# Patient Record
Sex: FEMALE | Race: WHITE | Employment: OTHER | URBAN - METROPOLITAN AREA
[De-identification: names, ages, dates, MRNs, and addresses within clinical notes are randomized per-mention and may not be internally consistent; named-entity substitution may affect disease eponyms.]

---

## 2017-03-07 ENCOUNTER — HOSPITAL ENCOUNTER (OUTPATIENT)
Dept: MAMMOGRAPHY | Age: 72
Discharge: HOME | End: 2017-03-07
Attending: INTERNAL MEDICINE | Admitting: INTERNAL MEDICINE
Payer: MEDICARE

## 2017-03-07 DIAGNOSIS — Z12.31 VISIT FOR SCREENING MAMMOGRAM: ICD-10-CM

## 2017-03-07 PROCEDURE — 77067 SCR MAMMO BI INCL CAD: CPT

## 2017-07-19 ENCOUNTER — TELEPHONE (OUTPATIENT)
Dept: INTERNAL MEDICINE CLINIC | Age: 72
End: 2017-07-19
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
## 2017-07-24 RX ORDER — AMITRIPTYLINE HYDROCHLORIDE 150 MG/1
TABLET, FILM COATED ORAL
Qty: 90 TAB | Refills: 1 | Status: SHIPPED | OUTPATIENT
Start: 2017-07-24 | End: 2018-01-16 | Stop reason: SDUPTHER

## 2017-08-07 RX ORDER — PILOCARPINE HYDROCHLORIDE 5 MG/1
TABLET, FILM COATED ORAL
Qty: 120 TAB | Refills: 3 | Status: SHIPPED | OUTPATIENT
Start: 2017-08-07 | End: 2018-01-05 | Stop reason: SDUPTHER

## 2017-08-14 ENCOUNTER — OFFICE VISIT (OUTPATIENT)
Dept: NEUROLOGY | Age: 72
End: 2017-08-14

## 2017-08-14 VITALS
OXYGEN SATURATION: 98 % | DIASTOLIC BLOOD PRESSURE: 70 MMHG | HEART RATE: 76 BPM | HEIGHT: 63 IN | SYSTOLIC BLOOD PRESSURE: 132 MMHG | WEIGHT: 169 LBS | BODY MASS INDEX: 29.95 KG/M2

## 2017-08-14 DIAGNOSIS — R26.9 GAIT DIFFICULTY: Primary | ICD-10-CM

## 2017-08-14 RX ORDER — METOPROLOL SUCCINATE 25 MG/1
TABLET, EXTENDED RELEASE ORAL DAILY
COMMUNITY
End: 2018-05-31

## 2017-08-14 NOTE — PATIENT INSTRUCTIONS
[facility]   Statement to Patients  April 1, 2014      In an effort to ensure the large volume of patient prescription refills is processed in the most efficient and expeditious manner, we are asking our patients to assist us by calling your Pharmacy for all prescription refills, this will include also your  Mail Order Pharmacy. The pharmacy will contact our office electronically to continue the refill process. Please do not wait until the last minute to call your pharmacy. We need at least 48 hours (2days) to fill prescriptions. We also encourage you to call your pharmacy before going to  your prescription to make sure it is ready. With regard to controlled substance prescription refill requests (narcotic refills) that need to be picked up at our office, we ask your cooperation by providing us with at least 72 hours (3days) notice that you will need a refill. We will not refill narcotic prescription refill requests after 4:00pm on any weekday, Monday through Thursday, or after 2:00pm on Fridays, or on the weekends. We encourage everyone to explore another way of getting your prescription refill request processed using MarkIT[employer], our patient web portal through our electronic medical record system. MarkITx is an efficient and effective way to communicate your medication request directly to the office and  downloadable as an wang on your smart phone . MarkITx also features a review functionality that allows you to view your medication list as well as leave messages for your physician. Are you ready to get connected? If so please review the attatched instructions or speak to any of our staff to get you set up right away! Thank you so much for your cooperation. Should you have any questions please contact our Practice Administrator.     The Physicians and Staff,  [facility]

## 2017-08-14 NOTE — PROGRESS NOTES
HISTORY OF PRESENT ILLNESS  Susan Hirsch is a 67 y.o. female. HPI Comments: Elinor Loco is here today for trouble with falls. She states that once every month or 2 she will have a fall. It can occur outside it can occur inside. It does not have to involve any obstruction on the floor such as a bump in the rug or a threshold. She denies any bowel or bladder complaints. She denies weakness elsewhere in her body. Has no family history of significant neurologic disease. She is a healthy person but does have a history of migraine headaches for which she takes amitriptyline 150 mg a day. Fall   The history is provided by the patient. Associated symptoms include headaches. Review of Systems   Gastrointestinal: Positive for constipation and heartburn. Neurological: Positive for headaches. All other systems reviewed and are negative. Current Outpatient Prescriptions on File Prior to Visit   Medication Sig Dispense Refill    pilocarpine (SALAGEN) 5 mg tablet TAKE 1 TABLET BY MOUTH 4 TIMES A  Tab 3    amitriptyline (ELAVIL) 150 mg tablet TAKE 1 TABLET AT BEDTIME 90 Tab 1    simvastatin (ZOCOR) 20 mg tablet Take 40 mg by mouth nightly.  levothyroxine (SYNTHROID) 100 mcg tablet Take 100 mcg by mouth daily (before breakfast).  estradiol (ESTRACE) 1 mg tablet Take 1 mg by mouth daily.  oxyCODONE-acetaminophen (PERCOCET) 5-325 mg per tablet Take 1 Tab by mouth every six (6) hours as needed for Pain. 12 Tab 0    hydrocodone-acetaminophen (LORTAB) 2.5-500 mg per tablet Take 1 Tab by mouth every six (6) hours as needed for Pain. 30 Tab 1    omeprazole (PRILOSEC) 20 mg capsule Take 20 mg by mouth daily.  conjugated estrogens (PREMARIN) 0.625 mg tablet Take 0.625 mg by mouth. No current facility-administered medications on file prior to visit.       Past Medical History:   Diagnosis Date    Arthritis     feet and hands    GERD (gastroesophageal reflux disease)     hypercholesteremia     elevatd cholesterol    Hypothyroid     hypothyroid    Migraines     Neurological disorder     migraines    Sjoegren syndrome (HCC)     dry eyes     Family History   Problem Relation Age of Onset    Heart Disease Mother      Social History     Social History    Marital status:      Spouse name: N/A    Number of children: N/A    Years of education: N/A     Social History Main Topics    Smoking status: Never Smoker    Smokeless tobacco: Never Used    Alcohol use Yes      Comment: rarely    Drug use: No    Sexual activity: Yes     Partners: Male     Birth control/ protection: Surgical     Other Topics Concern    None     Social History Narrative     /70  Pulse 76  Ht 5' 3\" (1.6 m)  Wt 169 lb (76.7 kg)  SpO2 98%  BMI 29.94 kg/m2      Physical Exam   Constitutional: She is oriented to person, place, and time. She appears well-developed and well-nourished. No distress. HENT:   Head: Normocephalic and atraumatic. Nose: Nose normal.   Mouth/Throat: No oropharyngeal exudate. Eyes: Conjunctivae and EOM are normal. Pupils are equal, round, and reactive to light. No scleral icterus. Neck: Normal range of motion. Neck supple. No thyromegaly present. Cardiovascular: Normal rate and regular rhythm. Exam reveals no friction rub. No murmur heard. Pulmonary/Chest: Effort normal and breath sounds normal.   Musculoskeletal: Normal range of motion. She exhibits edema. She exhibits no tenderness or deformity. Lymphadenopathy:     She has no cervical adenopathy. Neurological: She is alert and oriented to person, place, and time. She has normal strength and normal reflexes. She is not disoriented. She displays no atrophy and no tremor. No cranial nerve deficit or sensory deficit. She exhibits normal muscle tone. She displays a negative Romberg sign. Coordination abnormal. Gait normal. She displays no Babinski's sign on the right side.  She displays no Babinski's sign on the left side. Speech, language and mentation normal     Skin: Skin is warm and dry. No rash noted. She is not diaphoretic. No erythema. Psychiatric: She has a normal mood and affect. Her behavior is normal. Judgment and thought content normal.       ASSESSMENT and PLAN  FALLS  I see no evidence of neurologic or neuromuscular to this patient which could be responsible for her falls. I am going to set her up for an MRI scan of her brain without contrast.  I will attempt to rule out any significant neurologic disease. I explained to her that with a normal MRI scan and normal exam the likelihood of this being a significant disease is very small. I did recommend to her that for moving around outside on uneven ground she consider using a walking stick or cane as it draws her eyes to the ground where she places the cane or stick, not that the cane actually provides any support. . I will plan to see her back in 6 months and we will see if this problem is progressive.

## 2017-08-14 NOTE — MR AVS SNAPSHOT
Visit Information Date & Time Provider Department Dept. Phone Encounter #  
 8/14/2017  9:00 AM Camelia Queen MD Neurology Clinic at [facility] [phone] [id number] Follow-up Instructions Return in about 6 months (around 2/14/2018). Your Appointments 11/20/2017  2:30 PM  
Follow Up with Nivia Officer, MD [address] ([address]) Appt Note: [address] [id number] [address] [facility] [phone]  
  
    
 2/12/2018  9:40 AM  
Follow Up with Camelia Queen MD  
Neurology Clinic at [facility] [address]) Appt Note: follow up  falls $ 0 CP [address], 
[address], [address] [address]  
[address], [address], [address] [address] Upcoming Health Maintenance Date Due Hepatitis C Screening 1945 DTaP/Tdap/Td series (1 - Tdap) 5/1/1966 FOBT Q 1 YEAR AGE 50-75 5/1/1995 ZOSTER VACCINE AGE 60> 3/1/2005 GLAUCOMA SCREENING Q2Y 5/1/2010 OSTEOPOROSIS SCREENING (DEXA) 5/1/2010 MEDICARE YEARLY EXAM 5/1/2010 Pneumococcal 65+ High/Highest Risk (2 of 2 - PPSV23) 11/9/2015 INFLUENZA AGE 9 TO ADULT 8/1/2017 BREAST CANCER SCRN MAMMOGRAM 3/7/2019 Allergies as of 8/14/2017  Review Complete On: 8/14/2017 By: Karina Shah LPN Severity Noted Reaction Type Reactions Codeine Medium 04/07/2011   Side Effect Nausea and Vomiting Current Immunizations  Reviewed on 11/9/2011 Name Date Influenza Vaccine Split 10/26/2011 ZZZ-RETIRED (DO NOT USE) Pneumococcal Vaccine (Unspecified Type) 11/9/2010 Not reviewed this visit You Were Diagnosed With   
  
 Codes Comments Gait difficulty    -  Primary ICD-10-CM: R26.9 ICD-9-CM: 676. 2 Vitals BP Pulse Height(growth percentile) Weight(growth percentile) SpO2 BMI  
 132/70 76 5' 3\" (1.6 m) 169 lb (76.7 kg) 98% 29.94 kg/m2 OB Status Smoking Status Hysterectomy Never Smoker Vitals History BMI and BSA Data Body Mass Index Body Surface Area  
 29.94 kg/m 2 1.85 m 2 Preferred Pharmacy Pharmacy Name Phone [facility]ACY #[id number] [phone] [facility] [phone] Your Updated Medication List  
  
   
This list is accurate as of: 8/14/17  9:24 AM.  Always use your most recent med list.  
  
  
  
  
 amitriptyline 150 mg tablet Commonly known as:  ELAVIL TAKE 1 TABLET AT BEDTIME  
  
 estradiol 1 mg tablet Commonly known as:  ESTRACE Take 1 mg by mouth daily. HYDROcodone-acetaminophen 2.5-500 mg per tablet Commonly known as:  Rusty Ryan Take 1 Tab by mouth every six (6) hours as needed for Pain.  
  
 metoprolol succinate 25 mg XL tablet Commonly known as:  TOPROL-XL Take  by mouth daily. oxyCODONE-acetaminophen 5-325 mg per tablet Commonly known as:  PERCOCET Take 1 Tab by mouth every six (6) hours as needed for Pain.  
  
 pilocarpine 5 mg tablet Commonly known as:  SALAGEN  
TAKE 1 TABLET BY MOUTH 4 TIMES A DAY PREMARIN 0.625 mg tablet Generic drug:  conjugated estrogens Take 0.625 mg by mouth. PriLOSEC 20 mg capsule Generic drug:  omeprazole Take 20 mg by mouth daily. simvastatin 20 mg tablet Commonly known as:  ZOCOR Take 40 mg by mouth nightly. SYNTHROID 100 mcg tablet Generic drug:  levothyroxine Take 100 mcg by mouth daily (before breakfast). Follow-up Instructions Return in about 6 months (around 2/14/2018). To-Do List   
 08/23/2017 Imaging:  MRI BRAIN WO CONT Patient Instructions PRESCRIPTION REFILL POLICY [place] [facility] Statement to Patients April 1, 2014 In an effort to ensure the large volume of patient prescription refills is processed in the most efficient and expeditious manner, we are asking our patients to assist us by calling your Pharmacy for all prescription refills, this will include also your  Mail Order Pharmacy. The pharmacy will contact our office electronically to continue the refill process. Please do not wait until the last minute to call your pharmacy. We need at least 48 hours (2days) to fill prescriptions. We also encourage you to call your pharmacy before going to  your prescription to make sure it is ready. With regard to controlled substance prescription refill requests (narcotic refills) that need to be picked up at our office, we ask your cooperation by providing us with at least 72 hours (3days) notice that you will need a refill. We will not refill narcotic prescription refill requests after 4:00pm on any weekday, Monday through Thursday, or after 2:00pm on Fridays, or on the weekends. We encourage everyone to explore another way of getting your prescription refill request processed using [employer], our patient web portal through our electronic medical record system. [employer] is an efficient and effective way to communicate your medication request directly to the office and  downloadable as an wang on your smart phone . [employer] also features a review functionality that allows you to view your medication list as well as leave messages for your physician. Are you ready to get connected? If so please review the attatched instructions or speak to any of our staff to get you set up right away! Thank you so much for your cooperation. Should you have any questions please contact our Practice Administrator. The Physicians and Staff,  [facility] Introducing [facility] SERVICES!    
 [facility] introduces [employer] patient portal. Now you can access parts of your medical record, email your doctor's office, and request medication refills online. 1. In your internet browser, go to [email or web address] 2. Click on the First Time User? Click Here link in the Sign In box. You will see the New Member Sign Up page. 3. Enter your Immedia Access Code exactly as it appears below. You will not need to use this code after youve completed the sign-up process. If you do not sign up before the expiration date, you must request a new code. · Immedia Access Code: [email or web address] Expires: 11/12/2017  9:24 AM 
 
4. Enter the last four digits of your Social Security Number (xxxx) and Date of Birth (mm/dd/yyyy) as indicated and click Submit. You will be taken to the next sign-up page. 5. Create a Immedia ID. This will be your Immedia login ID and cannot be changed, so think of one that is secure and easy to remember. 6. Create a Immedia password. You can change your password at any time. 7. Enter your Password Reset Question and Answer. This can be used at a later time if you forget your password. 8. Enter your e-mail address. You will receive e-mail notification when new information is available in [address] 9. Click Sign Up. You can now view and download portions of your medical record. 10. Click the Download Summary menu link to download a portable copy of your medical information. If you have questions, please visit the Frequently Asked Questions section of the Immedia website. Remember, Immedia is NOT to be used for urgent needs. For medical emergencies, dial 911. Now available from your iPhone and Android! Please provide this summary of care documentation to your next provider. Your primary care clinician is listed as ANNMARIE Ramirez. If you have any questions after today's visit, please call [phone].

## 2017-08-28 ENCOUNTER — HOSPITAL ENCOUNTER (OUTPATIENT)
Dept: MRI IMAGING | Age: 72
Discharge: HOME | End: 2017-08-28
Attending: PSYCHIATRY & NEUROLOGY
Payer: MEDICARE

## 2017-08-28 DIAGNOSIS — R26.9 GAIT DIFFICULTY: ICD-10-CM

## 2017-08-28 PROCEDURE — 70551 MRI BRAIN STEM W/O DYE: CPT

## 2017-10-19 PROBLEM — M89.9 DISORDER OF BONE AND CARTILAGE: Status: ACTIVE | Noted: 2017-10-19

## 2017-10-19 PROBLEM — M25.552 HIP PAIN, ACUTE, LEFT: Status: ACTIVE | Noted: 2017-10-19

## 2017-10-19 PROBLEM — Z79.899 HIGH RISK MEDICATION USE: Status: ACTIVE | Noted: 2017-10-19

## 2017-10-19 PROBLEM — M35.00 SJOGREN'S SYNDROME (HCC): Status: ACTIVE | Noted: 2017-10-19

## 2017-10-19 PROBLEM — E03.8 ADULT ONSET HYPOTHYROIDISM: Status: ACTIVE | Noted: 2017-10-19

## 2017-10-19 PROBLEM — G43.909 MIGRAINE: Status: ACTIVE | Noted: 2017-10-19

## 2017-10-19 PROBLEM — M54.9 BACK PAIN WITH RADIATION: Status: ACTIVE | Noted: 2017-10-19

## 2017-10-19 PROBLEM — R35.0 URINARY FREQUENCY: Status: ACTIVE | Noted: 2017-10-19

## 2017-10-19 PROBLEM — K58.1 IRRITABLE BOWEL SYNDROME WITH CONSTIPATION: Status: ACTIVE | Noted: 2017-10-19

## 2017-10-19 PROBLEM — E03.9 HYPOTHYROID: Status: ACTIVE | Noted: 2017-10-19

## 2017-10-19 PROBLEM — M94.9 DISORDER OF BONE AND CARTILAGE: Status: ACTIVE | Noted: 2017-10-19

## 2017-10-19 PROBLEM — N95.9 MENOPAUSAL DISORDER: Status: ACTIVE | Noted: 2017-10-19

## 2017-10-19 PROBLEM — E78.5 HYPERLIPIDEMIA: Status: ACTIVE | Noted: 2017-10-19

## 2017-10-19 PROBLEM — M25.569 KNEE PAIN: Status: ACTIVE | Noted: 2017-10-19

## 2017-10-19 RX ORDER — SODIUM FLUORIDE 6 MG/ML
PASTE, DENTIFRICE DENTAL
COMMUNITY
End: 2018-05-31

## 2017-10-19 RX ORDER — DICLOFENAC SODIUM 10 MG/G
GEL TOPICAL 4 TIMES DAILY
COMMUNITY
End: 2018-05-31

## 2017-11-29 ENCOUNTER — OFFICE VISIT (OUTPATIENT)
Dept: INTERNAL MEDICINE CLINIC | Age: 72
End: 2017-11-29

## 2017-11-29 VITALS
RESPIRATION RATE: 18 BRPM | HEART RATE: 85 BPM | SYSTOLIC BLOOD PRESSURE: 110 MMHG | TEMPERATURE: 98.1 F | HEIGHT: 63 IN | OXYGEN SATURATION: 94 % | WEIGHT: 175.4 LBS | BODY MASS INDEX: 31.08 KG/M2 | DIASTOLIC BLOOD PRESSURE: 70 MMHG

## 2017-11-29 DIAGNOSIS — E03.8 ADULT ONSET HYPOTHYROIDISM: Primary | ICD-10-CM

## 2017-11-29 DIAGNOSIS — Z79.899 HIGH RISK MEDICATION USE: ICD-10-CM

## 2017-11-29 DIAGNOSIS — E78.00 PURE HYPERCHOLESTEROLEMIA: ICD-10-CM

## 2017-11-29 PROBLEM — E78.5 HYPERLIPIDEMIA: Status: RESOLVED | Noted: 2017-10-19 | Resolved: 2017-11-29

## 2017-11-29 NOTE — MR AVS SNAPSHOT
Visit Information Date & Time Provider Department Dept. Phone Encounter #  
 11/29/2017  2:30 PM ANNMARIE Palafox MD [facility] [id number] Follow-up Instructions Return in about 6 months (around 5/29/2018) for follow up. Your Appointments 2/2/2018  8:40 AM  
Follow Up with Kailee Wild MD  
Neurology Clinic at [facility] [address]) Appt Note: follow up  falls $ 0 CP jll; follow up falls $ 0 CP jll rs 2/12/18. ..tlw 11/6/17  
 [address] [facility], 
[address] [place] [address], [address] [address]  
[address] [facility], [address] [place] [address], [address] [address] Upcoming Health Maintenance Date Due Hepatitis C Screening 1945 DTaP/Tdap/Td series (1 - Tdap) 5/1/1966 FOBT Q 1 YEAR AGE 50-75 5/1/1995 ZOSTER VACCINE AGE 60> 3/1/2005 GLAUCOMA SCREENING Q2Y 5/1/2010 OSTEOPOROSIS SCREENING (DEXA) 5/1/2010 MEDICARE YEARLY EXAM 5/1/2010 Pneumococcal 65+ High/Highest Risk (2 of 2 - PPSV23) 11/9/2015 BREAST CANCER SCRN MAMMOGRAM 3/7/2019 Allergies as of 11/29/2017  Review Complete On: 11/29/2017 By: Dian Espana MD  
  
 Severity Noted Reaction Type Reactions Codeine Medium 04/07/2011   Side Effect Nausea and Vomiting Current Immunizations  Reviewed on 11/29/2017 Name Date Influenza Vaccine Split 10/26/2011 Pneumococcal Conjugate (PCV-13) 11/12/2015 ZZZ-RETIRED (DO NOT USE) Pneumococcal Vaccine (Unspecified Type) 11/9/2010 Reviewed by Dina Espana MD on 11/29/2017 at  3:27 PM  
You Were Diagnosed With   
  
 Codes Comments Adult onset hypothyroidism    -  Primary ICD-10-CM: E03.8 ICD-9-CM: 244.8 High risk medication use     ICD-10-CM: Z79.899 ICD-9-CM: V58.69 Pure hypercholesterolemia     ICD-10-CM: E78.00 ICD-9-CM: 272.0 Vitals BP Pulse Temp Resp Height(growth percentile) Weight(growth percentile) 110/70 (BP 1 Location: Left arm, BP Patient Position: Sitting) 85 98.1 °F (36.7 °C) (Oral) 18 5' 3\" (1.6 m) 175 lb 6.4 oz (79.6 kg) SpO2 BMI OB Status Smoking Status 94% 31.07 kg/m2 Hysterectomy Never Smoker Vitals History BMI and BSA Data Body Mass Index Body Surface Area 31.07 kg/m 2 1.88 m 2 Preferred Pharmacy Pharmacy Name Phone [facility]/PHARMACY #[id number] [facility] [phone] Your Updated Medication List  
  
   
This list is accurate as of: 11/29/17  3:43 PM.  Always use your most recent med list.  
  
  
  
  
 amitriptyline 150 mg tablet Commonly known as:  ELAVIL TAKE 1 TABLET AT BEDTIME  
  
 CLINPRO 5000 1.1 % Pste Generic drug:  fluoride (sodium) by Dental route. LINZESS 290 mcg Cap capsule Generic drug:  linaclotide Take  by mouth Daily (before breakfast). metoprolol succinate 25 mg XL tablet Commonly known as:  TOPROL-XL Take  by mouth daily. pilocarpine 5 mg tablet Commonly known as:  SALAGEN  
TAKE 1 TABLET BY MOUTH 4 TIMES A DAY PREMARIN 0.625 mg tablet Generic drug:  conjugated estrogens Take 0.625 mg by mouth. PriLOSEC 20 mg capsule Generic drug:  omeprazole Take 20 mg by mouth daily. simvastatin 20 mg tablet Commonly known as:  ZOCOR Take 40 mg by mouth nightly. SYNTHROID 100 mcg tablet Generic drug:  levothyroxine Take 100 mcg by mouth daily (before breakfast). VOLTAREN 1 % Gel Generic drug:  diclofenac Apply  to affected area four (4) times daily. Follow-up Instructions Return in about 6 months (around 5/29/2018) for follow up. Introducing [facility]!    
 [facility] Goode introduces [employer] patient portal. Now you can access parts of your medical record, email your doctor's office, and request medication refills online. 1. In your internet browser, go to [email or web address] 2. Click on the First Time User? Click Here link in the Sign In box. You will see the New Member Sign Up page. 3. Enter your travelfox Access Code exactly as it appears below. You will not need to use this code after youve completed the sign-up process. If you do not sign up before the expiration date, you must request a new code. · travelfox Access Code: [email or web address] Expires: 2/27/2018  2:28 PM 
 
4. Enter the last four digits of your Social Security Number (xxxx) and Date of Birth (mm/dd/yyyy) as indicated and click Submit. You will be taken to the next sign-up page. 5. Create a travelfox ID. This will be your travelfox login ID and cannot be changed, so think of one that is secure and easy to remember. 6. Create a travelfox password. You can change your password at any time. 7. Enter your Password Reset Question and Answer. This can be used at a later time if you forget your password. 8. Enter your e-mail address. You will receive e-mail notification when new information is available in [address] 9. Click Sign Up. You can now view and download portions of your medical record. 10. Click the Download Summary menu link to download a portable copy of your medical information. If you have questions, please visit the Frequently Asked Questions section of the travelfox website. Remember, travelfox is NOT to be used for urgent needs. For medical emergencies, dial 911. Now available from your iPhone and Android! Please provide this summary of care documentation to your next provider. Your primary care clinician is listed as ANNMARIE Treadwell. If you have any questions after today's visit, please call [phone].

## 2017-11-29 NOTE — PROGRESS NOTES
This note will not be viewable in [address] Valentina Mott is a 67 y.o. female and presents with Cholesterol Problem (follow up) and Hypothyroidism (follow up)  . Subjective:  Mrs. Delfino Davila presents today for follow-up of hyperlipidemia and hypothyroidism. She continues to complain of dry mouth with history Sjogren's syndrome. She remains on amitriptyline nightly. She is taking this for migraine prophylaxis but perhaps this is contributing to her dry mouth. She also notes lightheadedness or dizziness which is intermittent. She is taking half a dose of her metoprolol. Past Medical History:   Diagnosis Date    Adult onset hypothyroidism 10/19/2017    Arthritis     feet and hands    Back pain with radiation 10/19/2017    Disorder of bone and cartilage 10/19/2017    GERD (gastroesophageal reflux disease)     High risk medication use 10/19/2017    Hip pain, acute, left 10/19/2017    hypercholesteremia     elevatd cholesterol    Hyperlipidemia 10/19/2017    Hypothyroid     hypothyroid    Hypothyroid 10/19/2017    Irritable bowel syndrome with constipation 10/19/2017    Knee pain 10/19/2017    Menopausal disorder 10/19/2017    Migraine 10/19/2017    Migraines     Neurological disorder     migraines    Sjoegren syndrome ([address]     dry eyes    Sjogren's syndrome ([address] 10/19/2017    Urinary frequency 10/19/2017     Past Surgical History:   Procedure Laterality Date    ABDOMEN SURGERY PROC UNLISTED  2011    removed \"sludge\" from duct in gallbladder    HX GYN      hysterectomy    HX ORTHOPAEDIC  11/9/11    SPINE LUMBAR LAMINECTOMY - L4-5 LAMINECTOMY FOR REMOVAL OF EXTRA DURAL LESION ON THE LEFT    HX OTHER SURGICAL      ganglion cyst removed finger     Allergies   Allergen Reactions    Codeine Nausea and Vomiting     Current Outpatient Prescriptions   Medication Sig Dispense Refill    fluoride, sodium, (CLINPRO 5000) 1.1 % pste by Dental route.       diclofenac (VOLTAREN) 1 % gel Apply  to affected area four (4) times daily.  linaclotide (LINZESS) 290 mcg cap capsule Take  by mouth Daily (before breakfast).  metoprolol succinate (TOPROL-XL) 25 mg XL tablet Take  by mouth daily.  pilocarpine (SALAGEN) 5 mg tablet TAKE 1 TABLET BY MOUTH 4 TIMES A  Tab 3    amitriptyline (ELAVIL) 150 mg tablet TAKE 1 TABLET AT BEDTIME 90 Tab 1    omeprazole (PRILOSEC) 20 mg capsule Take 20 mg by mouth daily.  simvastatin (ZOCOR) 20 mg tablet Take 40 mg by mouth nightly.  conjugated estrogens (PREMARIN) 0.625 mg tablet Take 0.625 mg by mouth.  levothyroxine (SYNTHROID) 100 mcg tablet Take 100 mcg by mouth daily (before breakfast). Social History     Social History    Marital status:      Spouse name: N/A    Number of children: N/A    Years of education: N/A     Social History Main Topics    Smoking status: Never Smoker    Smokeless tobacco: Never Used    Alcohol use Yes      Comment: rarely    Drug use: No    Sexual activity: Yes     Partners: Male     Birth control/ protection: Surgical     Other Topics Concern    None     Social History Narrative     Family History   Problem Relation Age of Onset    Heart Disease Mother        Review of Systems  Constitutional:  negative for fevers, chills, anorexia and weight loss  Eyes:    negative for visual disturbance and irritation  ENT:    negative for tinnitus,sore throat,nasal congestion,ear pains. hoarseness  Respiratory:     negative for cough, hemoptysis, dyspnea,wheezing  CV:    negative for chest pain, palpitations, lower extremity edema  GI:    negative for nausea, vomiting, diarrhea, abdominal pain,melena  Endo:               negative for polyuria,polydipsia,polyphagia,heat intolerance  Genitourinary : negative for frequency, dysuria and hematuria  Integumentary: negative for rash and pruritus  Hematologic:   negative for easy bruising and gum/nose bleeding  Musculoskel:  negative for myalgias, arthralgias, back pain, muscle weakness, joint pain  Neurological:   negative for headaches, dizziness, vertigo, memory problems and gait   Behavl/Psych:  negative for feelings of anxiety, depression, mood changes  ROS otherwise negative      Objective:  Visit Vitals    /70 (BP 1 Location: Left arm, BP Patient Position: Sitting)    Pulse 85    Temp 98.1 °F (36.7 °C) (Oral)    Resp 18    Ht 5' 3\" (1.6 m)    Wt 175 lb 6.4 oz (79.6 kg)    SpO2 94%    BMI 31.07 kg/m2     Physical Exam:   General appearance - alert, well appearing, and in no distress  Mental status - alert, oriented to person, place, and time  EYE-NIEVES, EOMI, fundi normal, corneas normal, no foreign bodies  ENT-ENT exam normal, no neck nodes or sinus tenderness  Nose - normal and patent, no erythema, discharge or polyps  Mouth - mucous membranes moist, pharynx normal without lesions  Neck - supple, no significant adenopathy   Chest - clear to auscultation, no wheezes, rales or rhonchi, symmetric air entry   Heart - normal rate, regular rhythm, normal S1, S2, no murmurs, rubs, clicks or gallops   Abdomen - soft, nontender, nondistended, no masses or organomegaly  Lymph- no adenopathy palpable  Ext-peripheral pulses normal, no pedal edema, no clubbing or cyanosis  Skin-Warm and dry. no hyperpigmentation, vitiligo, or suspicious lesions  Neuro -alert, oriented, normal speech, no focal findings or movement disorder noted      Assessment/Plan:  Diagnoses and all orders for this visit:    1. Adult onset hypothyroidism    2. High risk medication use    3. Pure hypercholesterolemia          ICD-10-CM ICD-9-CM    1. Adult onset hypothyroidism E03.8 244.8    2. High risk medication use Z79.899 V58.69    3. Pure hypercholesterolemia E78.00 272.0      Plan:    Blood pressure is relatively low today. We will discontinue metoprolol altogether. Decrease amitriptyline to 75 mg nightly.     Lab work reviewed from previous visit is stable and will postpone follow-up labs until return visit in 6 months. Follow-up Disposition:  Return in about 6 months (around 5/29/2018) for follow up. I have reviewed with the patient details of the assessment and plan and all questions were answered. Relevent patient education was performed. Verbal and/or written instructions (see AVS) provided. The most recent lab findings were reviewed with the patient. Plan was discussed with patient who verbally expressed understanding. An After Visit Summary was printed and given to the patient.     Chiki Jose MD

## 2017-11-29 NOTE — PROGRESS NOTES
Chief Complaint   Patient presents with    Cholesterol Problem     follow up    Hypothyroidism     follow up         1. Have you been to the ER, urgent care clinic since your last visit? Hospitalized since your last visit? No    2. Have you seen or consulted any other health care providers outside of the [address] [place] [address] since your last visit? Include any pap smears or colon screening.  No

## 2017-12-11 RX ORDER — LEVOTHYROXINE SODIUM 125 UG/1
TABLET ORAL
Qty: 90 TAB | Refills: 3 | Status: SHIPPED | OUTPATIENT
Start: 2017-12-11 | End: 2018-12-03 | Stop reason: SDUPTHER

## 2018-01-05 RX ORDER — PILOCARPINE HYDROCHLORIDE 5 MG/1
TABLET, FILM COATED ORAL
Qty: 120 TAB | Refills: 3 | Status: SHIPPED | OUTPATIENT
Start: 2018-01-05 | End: 2018-06-18 | Stop reason: SDUPTHER

## 2018-01-16 RX ORDER — AMITRIPTYLINE HYDROCHLORIDE 150 MG/1
TABLET, FILM COATED ORAL
Qty: 90 TAB | Refills: 1 | Status: SHIPPED | OUTPATIENT
Start: 2018-01-16 | End: 2018-02-13 | Stop reason: ALTCHOICE

## 2018-01-16 RX ORDER — SIMVASTATIN 20 MG/1
TABLET, FILM COATED ORAL
Qty: 90 TAB | Refills: 3 | Status: SHIPPED | OUTPATIENT
Start: 2018-01-16 | End: 2019-01-17 | Stop reason: SDUPTHER

## 2018-02-13 ENCOUNTER — OFFICE VISIT (OUTPATIENT)
Dept: NEUROLOGY | Age: 73
End: 2018-02-13

## 2018-02-13 VITALS
HEART RATE: 88 BPM | OXYGEN SATURATION: 98 % | SYSTOLIC BLOOD PRESSURE: 120 MMHG | DIASTOLIC BLOOD PRESSURE: 80 MMHG | HEIGHT: 63 IN | WEIGHT: 171 LBS | BODY MASS INDEX: 30.3 KG/M2

## 2018-02-13 DIAGNOSIS — R26.9 GAIT DISORDER: Primary | ICD-10-CM

## 2018-02-13 DIAGNOSIS — F51.01 PRIMARY INSOMNIA: ICD-10-CM

## 2018-02-13 RX ORDER — AMITRIPTYLINE HYDROCHLORIDE 25 MG/1
TABLET, FILM COATED ORAL
Qty: 150 TAB | Refills: 5 | Status: SHIPPED | OUTPATIENT
Start: 2018-02-13 | End: 2019-06-18 | Stop reason: SDUPTHER

## 2018-02-13 NOTE — PROGRESS NOTES
HISTORY OF PRESENT ILLNESS  Se Brown is a 67 y.o. female. HPI Comments: The patient is here today for follow-up with trouble with her migraines and trouble with her balance. She is a healthy person but does have a history of migraine headaches for which she takes amitriptyline 150 mg a day. Her headaches under control, her balance is still not as good as it used to be. She continues on her levothyroxine, simvastatin as needed Percocet very rarely and Prilosec. She does not get nausea and vomiting with her migraines she rather just has a headache. Review of Systems   Gastrointestinal: Positive for constipation and heartburn. Neurological: Positive for headaches. All other systems reviewed and are negative. Current Outpatient Prescriptions on File Prior to Visit   Medication Sig Dispense Refill    simvastatin (ZOCOR) 20 mg tablet TAKE 1 TABLET EVERY DAY 90 Tab 3    pilocarpine (SALAGEN) 5 mg tablet TAKE 1 TABLET BY MOUTH 4 TIMES A  Tab 3    levothyroxine (SYNTHROID) 125 mcg tablet TAKE 1 (ONE) TABLET, ORAL, DAILY FOR THYROID 90 Tab 3    diclofenac (VOLTAREN) 1 % gel Apply  to affected area four (4) times daily.  omeprazole (PRILOSEC) 20 mg capsule Take 20 mg by mouth daily.  levothyroxine (SYNTHROID) 100 mcg tablet Take 100 mcg by mouth daily (before breakfast).  fluoride, sodium, (CLINPRO 5000) 1.1 % pste by Dental route.  metoprolol succinate (TOPROL-XL) 25 mg XL tablet Take  by mouth daily.  conjugated estrogens (PREMARIN) 0.625 mg tablet Take 0.625 mg by mouth. No current facility-administered medications on file prior to visit.       Past Medical History:   Diagnosis Date    Adult onset hypothyroidism 10/19/2017    Arthritis     feet and hands    Back pain with radiation 10/19/2017    Disorder of bone and cartilage 10/19/2017    GERD (gastroesophageal reflux disease)     High risk medication use 10/19/2017    Hip pain, acute, left 10/19/2017  hypercholesteremia     elevatd cholesterol    Hyperlipidemia 10/19/2017    Hypothyroid     hypothyroid    Hypothyroid 10/19/2017    Irritable bowel syndrome with constipation 10/19/2017    Knee pain 10/19/2017    Menopausal disorder 10/19/2017    Migraine 10/19/2017    Migraines     Neurological disorder     migraines    S[address]egren syndrome [address] [address]     dry eyes    Sjogren's syndrome ([facility] [address] 10/19/2017    Urinary frequency 10/19/2017     Family History   Problem Relation Age of Onset    Heart Disease Mother      Social History     Social History    Marital status:      Spouse name: N/A    Number of children: N/A    Years of education: N/A     Social History Main Topics    Smoking status: Never Smoker    Smokeless tobacco: Never Used    Alcohol use Yes      Comment: rarely    Drug use: No    Sexual activity: Yes     Partners: Male     Birth control/ protection: Surgical     Other Topics Concern    None     Social History Narrative     /80  Pulse 88  Ht 5' 3\" (1.6 m)  Wt 171 lb (77.6 kg)  SpO2 98%  BMI 30.29 kg/m2      Physical Exam   Constitutional: She is oriented to person, place, and time. She appears well-developed and well-nourished. No distress. HENT:   Head: Normocephalic and atraumatic. Neurological: She is alert and oriented to person, place, and time. She has normal strength and normal reflexes  Speech, language and mentation normal   Skin: Skin is warm and dry. No rash noted. She is not diaphoretic. No erythema. Psychiatric: She has a normal mood and affect. Her behavior is normal. Judgment and thought content normal.       ASSESSMENT and PLAN  FALLS  Her balance is a little bit better than it was last time however it is not returned to normal.  Her headaches are under control these days. I brought up the amitriptyline again and recommended to her that we at the very least cut the dose to less than 50 mg a day and ideally discontinue it.   She is willing to consider that now. I wrote her a tapering schedule taking away 25 mg a day every 2 weeks until she hits 50 mg a day. At that point I will switch her over to the 10 mg tablets and take away 10 mg every 2 weeks until she is totally off the medication. We will have to see how she is sleeping when we get to the lower doses. I would rather substitute trazodone for the amitriptyline if we have to T get her to sleep at that point. This note will not be viewable in [address] This note was created using voice recognition software. Despite editing, there may be syntax errors.

## 2018-02-13 NOTE — LETTER
2/13/2018 4:03 PM 
 
Patient:  Sandra Grimm YOB: 1945 Date of Visit: 2/13/2018 Dear Candis Cho MD 
[facility] [address] [address] [facility] [address] [id number] VIA In Basket 
 : Thank you for referring Ms. Kalpana Hess to me for evaluation/treatment. Below are the relevant portions of my assessment and plan of care. HISTORY OF PRESENT ILLNESS Sandra Grimm is a 67 y.o. female. HPI Comments: The patient is here today for follow-up with trouble with her migraines and trouble with her balance. She is a healthy person but does have a history of migraine headaches for which she takes amitriptyline 150 mg a day. Her headaches under control, her balance is still not as good as it used to be. She continues on her levothyroxine, simvastatin as needed Percocet very rarely and Prilosec. She does not get nausea and vomiting with her migraines she rather just has a headache. Review of Systems Gastrointestinal: Positive for constipation and heartburn. Neurological: Positive for headaches. All other systems reviewed and are negative. Current Outpatient Prescriptions on File Prior to Visit Medication Sig Dispense Refill  simvastatin (ZOCOR) 20 mg tablet TAKE 1 TABLET EVERY DAY 90 Tab 3  pilocarpine (SALAGEN) 5 mg tablet TAKE 1 TABLET BY MOUTH 4 TIMES A  Tab 3  
 levothyroxine (SYNTHROID) 125 mcg tablet TAKE 1 (ONE) TABLET, ORAL, DAILY FOR THYROID 90 Tab 3  
 diclofenac (VOLTAREN) 1 % gel Apply  to affected area four (4) times daily.  omeprazole (PRILOSEC) 20 mg capsule Take 20 mg by mouth daily.  levothyroxine (SYNTHROID) 100 mcg tablet Take 100 mcg by mouth daily (before breakfast).  fluoride, sodium, (CLINPRO 5000) 1.1 % pste by Dental route.  metoprolol succinate (TOPROL-XL) 25 mg XL tablet Take  by mouth daily.  conjugated estrogens (PREMARIN) 0.625 mg tablet Take 0.625 mg by mouth. No current facility-administered medications on file prior to visit. Past Medical History:  
Diagnosis Date  Adult onset hypothyroidism 10/19/2017  Arthritis   
 feet and hands  Back pain with radiation 10/19/2017  Disorder of bone and cartilage 10/19/2017  GERD (gastroesophageal reflux disease)  High risk medication use 10/19/2017  Hip pain, acute, left 10/19/2017  hypercholesteremia   
 elevatd cholesterol  Hyperlipidemia 10/19/2017  Hypothyroid   
 hypothyroid  Hypothyroid 10/19/2017  Irritable bowel syndrome with constipation 10/19/2017  Knee pain 10/19/2017  Menopausal disorder 10/19/2017  Migraine 10/19/2017  Migraines  Neurological disorder   
 migraines  Sjoegren syndrome ([facility] [address] dry eyes  Sjogren's syndrome ([facility] [address] [facility] 10/19/2017  Urinary frequency 10/19/2017 Family History Problem Relation Age of Onset  Heart Disease Mother Social History Social History  Marital status:  Spouse name: N/A  
 Number of children: N/A  
 Years of education: N/A Social History Main Topics  Smoking status: Never Smoker  Smokeless tobacco: Never Used  Alcohol use Yes Comment: rarely  Drug use: No  
 Sexual activity: Yes  
  Partners: Male Birth control/ protection: Surgical  
 
Other Topics Concern  None Social History Narrative /80  Pulse 88  Ht 5' 3\" (1.6 m)  Wt 171 lb (77.6 kg)  SpO2 98%  BMI 30.29 kg/m2 Physical Exam  
Constitutional: She is oriented to person, place, and time. She appears well-developed and well-nourished. No distress. HENT:  
Head: Normocephalic and atraumatic. Neurological: She is alert and oriented to person, place, and time. She has normal strength and normal reflexes Speech, language and mentation normal 
 Skin: Skin is warm and dry. No rash noted. She is not diaphoretic. No erythema. Psychiatric: She has a normal mood and affect. Her behavior is normal. Judgment and thought content normal.  
 
 
ASSESSMENT and PLAN 
FALLS Her balance is a little bit better than it was last time however it is not returned to normal.  Her headaches are under control these days. I brought up the amitriptyline again and recommended to her that we at the very least cut the dose to less than 50 mg a day and ideally discontinue it. She is willing to consider that now. I wrote her a tapering schedule taking away 25 mg a day every 2 weeks until she hits 50 mg a day. At that point I will switch her over to the 10 mg tablets and take away 10 mg every 2 weeks until she is totally off the medication. We will have to see how she is sleeping when we get to the lower doses. I would rather substitute trazodone for the amitriptyline if we have to T get her to sleep at that point. This note will not be viewable in [address] This note was created using voice recognition software. Despite editing, there may be syntax errors. If you have questions, please do not hesitate to call me. I look forward to following Ms. Molina Look along with you. Sincerely, Ann Marie Larios MD

## 2018-02-13 NOTE — PATIENT INSTRUCTIONS
[facility]   Statement to Patients  April 1, 2014      In an effort to ensure the large volume of patient prescription refills is processed in the most efficient and expeditious manner, we are asking our patients to assist us by calling your Pharmacy for all prescription refills, this will include also your  Mail Order Pharmacy. The pharmacy will contact our office electronically to continue the refill process. Please do not wait until the last minute to call your pharmacy. We need at least 48 hours (2days) to fill prescriptions. We also encourage you to call your pharmacy before going to  your prescription to make sure it is ready. With regard to controlled substance prescription refill requests (narcotic refills) that need to be picked up at our office, we ask your cooperation by providing us with at least 72 hours (3days) notice that you will need a refill. We will not refill narcotic prescription refill requests after 4:00pm on any weekday, Monday through Thursday, or after 2:00pm on Fridays, or on the weekends. We encourage everyone to explore another way of getting your prescription refill request processed using [employer], our patient web portal through our electronic medical record system. [employer] is an efficient and effective way to communicate your medication request directly to the office and  downloadable as an wang on your smart phone . [employer] also features a review functionality that allows you to view your medication list as well as leave messages for your physician. Are you ready to get connected? If so please review the attatched instructions or speak to any of our staff to get you set up right away! Thank you so much for your cooperation. Should you have any questions please contact our Practice Administrator.     The Physicians and Staff,  [facility]

## 2018-02-13 NOTE — MR AVS SNAPSHOT
[address] [address], 
[id number], [address] [facility] 
[phone] Patient: Rafael Louis MRN: [id number] XSI:2/0/9612 Visit Information Date & Time Provider Department Dept. Phone Encounter #  
 2/13/2018  2:40 PM Kelton Solis MD [facility] [phone] [id number] Your Appointments 6/14/2018  9:50 AM  
Follow Up with ANNMARIE Ruth MD  
[facility] 26 ([address]) Appt Note: [address] [address] [address] [address] [facility] [address] 8/17/2018  3:40 PM  
Follow Up with Kelton Solis MD  
[facility] [address] [facility]) Appt Note: follow up $ 0 CP jl 2/13/18  
 [address], [address] [address]  
[address] [facility], [address] [place] [address], [address] [facility] [address] [address] Upcoming Health Maintenance Date Due Hepatitis C Screening 1945 DTaP/Tdap/Td series (1 - Tdap) 5/1/1966 FOBT Q 1 YEAR AGE 50-75 5/1/1995 ZOSTER VACCINE AGE 60> 3/1/2005 GLAUCOMA SCREENING Q2Y 5/1/2010 OSTEOPOROSIS SCREENING (DEXA) 5/1/2010 MEDICARE YEARLY EXAM 5/1/2010 BREAST CANCER SCRN MAMMOGRAM 3/7/2019 Allergies as of 2/13/2018  Review Complete On: 2/13/2018 By: Ravin Cartwright LPN Severity Noted Reaction Type Reactions Codeine Medium 04/07/2011   Side Effect Nausea and Vomiting Current Immunizations  Reviewed on 11/29/2017 Name Date Influenza Vaccine Split 10/26/2011 Pneumococcal Conjugate (PCV-13) 11/12/2015 ZZZ-RETIRED (DO NOT USE) Pneumococcal Vaccine (Unspecified Type) 11/9/2010 Not reviewed this visit You Were Diagnosed With   
  
 Codes Comments Gait disorder    -  Primary ICD-10-CM: R26.9 ICD-9-CM: 781.2 Primary insomnia     ICD-10-CM: F51.01 
ICD-9-CM: 307.42 Vitals BP Pulse Height(growth percentile) Weight(growth percentile) SpO2 BMI  
 120/80 88 5' 3\" (1.6 m) 171 lb (77.6 kg) 98% 30.29 kg/m2 OB Status Smoking Status Hysterectomy Never Smoker Vitals History BMI and BSA Data Body Mass Index Body Surface Area  
 30.29 kg/m 2 1.86 m 2 Preferred Pharmacy Pharmacy Name Phone [facility] [phone] Your Updated Medication List  
  
   
This list is accurate as of: 2/13/18  2:59 PM.  Always use your most recent med list.  
  
  
  
  
 amitriptyline 25 mg tablet Commonly known as:  ELAVIL Take 5 tablets at night for a month then 4 tablets at night for a month then 3 tablets at night for a month and then 2 tablets at night  Indications: NEUROPATHIC PAIN  
  
 CLINPRO 5000 1.1 % Pste Generic drug:  fluoride (sodium) by Dental route. metoprolol succinate 25 mg XL tablet Commonly known as:  TOPROL-XL Take  by mouth daily. pilocarpine 5 mg tablet Commonly known as:  SALAGEN  
TAKE 1 TABLET BY MOUTH 4 TIMES A DAY PREMARIN 0.625 mg tablet Generic drug:  conjugated estrogens Take 0.625 mg by mouth. PriLOSEC 20 mg capsule Generic drug:  omeprazole Take 20 mg by mouth daily. simvastatin 20 mg tablet Commonly known as:  ZOCOR  
TAKE 1 TABLET EVERY DAY  
  
 * SYNTHROID 100 mcg tablet Generic drug:  levothyroxine Take 100 mcg by mouth daily (before breakfast). * levothyroxine 125 mcg tablet Commonly known as:  SYNTHROID  
TAKE 1 (ONE) TABLET, ORAL, DAILY FOR THYROID  
  
 VOLTAREN 1 % Gel Generic drug:  diclofenac Apply  to affected area four (4) times daily. * Notice: This list has 2 medication(s) that are the same as other medications prescribed for you.  Read the directions carefully, and ask your doctor or other care provider to review them with you. Prescriptions Printed Refills  
 amitriptyline (ELAVIL) 25 mg tablet 5 Sig: Take 5 tablets at night for a month then 4 tablets at night for a month then 3 tablets at night for a month and then 2 tablets at night  Indications: NEUROPATHIC PAIN Class: Print Patient Instructions PRESCRIPTION REFILL POLICY [facility] Statement to Patients April 1, 2014 In an effort to ensure the large volume of patient prescription refills is processed in the most efficient and expeditious manner, we are asking our patients to assist us by calling your Pharmacy for all prescription refills, this will include also your  Mail Order Pharmacy. The pharmacy will contact our office electronically to continue the refill process. Please do not wait until the last minute to call your pharmacy. We need at least 48 hours (2days) to fill prescriptions. We also encourage you to call your pharmacy before going to  your prescription to make sure it is ready. With regard to controlled substance prescription refill requests (narcotic refills) that need to be picked up at our office, we ask your cooperation by providing us with at least 72 hours (3days) notice that you will need a refill. We will not refill narcotic prescription refill requests after 4:00pm on any weekday, Monday through Thursday, or after 2:00pm on Fridays, or on the weekends. We encourage everyone to explore another way of getting your prescription refill request processed using Clodico, our patient web portal through our electronic medical record system. Clodico is an efficient and effective way to communicate your medication request directly to the office and  downloadable as an wang on your smart phone .  Clodico also features a review functionality that allows you to view your medication list as well as leave messages for your physician. Are you ready to get connected? If so please review the attatched instructions or speak to any of our staff to get you set up right away! Thank you so much for your cooperation. Should you have any questions please contact our Practice Administrator. The Physicians and Staff,  [facility] Introducing [facility] SERVICES! [facility] introduces 1bib patient portal. Now you can access parts of your medical record, email your doctor's office, and request medication refills online. 1. In your internet browser, go to [email or web address] 2. Click on the First Time User? Click Here link in the Sign In box. You will see the New Member Sign Up page. 3. Enter your 1bib Access Code exactly as it appears below. You will not need to use this code after youve completed the sign-up process. If you do not sign up before the expiration date, you must request a new code. · 1bib Access Code: [email or web address] Expires: 2/27/2018  2:28 PM 
 
4. Enter the last four digits of your Social Security Number (xxxx) and Date of Birth (mm/dd/yyyy) as indicated and click Submit. You will be taken to the next sign-up page. 5. Create a 1bib ID. This will be your 1bib login ID and cannot be changed, so think of one that is secure and easy to remember. 6. Create a 1bib password. You can change your password at any time. 7. Enter your Password Reset Question and Answer. This can be used at a later time if you forget your password. 8. Enter your e-mail address. You will receive e-mail notification when new information is available in [address] 9. Click Sign Up. You can now view and download portions of your medical record. 10. Click the Download Summary menu link to download a portable copy of your medical information.  
 
If you have questions, please visit the Frequently Asked Questions section of the [employer]. Remember, Storyfulhart is NOT to be used for urgent needs. For medical emergencies, dial 911. Now available from your iPhone and Android! Please provide this summary of care documentation to your next provider. Your primary care clinician is listed as ANNMARIE Henry Daily. If you have any questions after today's visit, please call [phone].

## 2018-03-09 ENCOUNTER — HOSPITAL ENCOUNTER (OUTPATIENT)
Dept: MAMMOGRAPHY | Age: 73
Discharge: HOME | End: 2018-03-09
Attending: INTERNAL MEDICINE
Payer: MEDICARE

## 2018-03-09 DIAGNOSIS — Z12.39 SCREENING BREAST EXAMINATION: ICD-10-CM

## 2018-03-09 PROCEDURE — 77067 SCR MAMMO BI INCL CAD: CPT

## 2018-05-07 ENCOUNTER — OFFICE VISIT (OUTPATIENT)
Dept: INTERNAL MEDICINE CLINIC | Age: 73
End: 2018-05-07

## 2018-05-07 VITALS
DIASTOLIC BLOOD PRESSURE: 85 MMHG | WEIGHT: 174 LBS | SYSTOLIC BLOOD PRESSURE: 137 MMHG | BODY MASS INDEX: 30.83 KG/M2 | HEIGHT: 63 IN | OXYGEN SATURATION: 96 % | HEART RATE: 87 BPM

## 2018-05-07 DIAGNOSIS — H61.21 HEARING LOSS DUE TO CERUMEN IMPACTION, RIGHT: Primary | ICD-10-CM

## 2018-05-07 NOTE — PATIENT INSTRUCTIONS
Earwax Blockage: Care Instructions  Your Care Instructions    Earwax is a natural substance that protects the ear canal. Normally, earwax drains from the ears and does not cause problems. Sometimes earwax builds up and hardens. Earwax blockage (also called cerumen impaction) can cause some loss of hearing and pain. When wax is tightly packed, you will need to have your doctor remove it. Follow-up care is a key part of your treatment and safety. Be sure to make and go to all appointments, and call your doctor if you are having problems. It's also a good idea to know your test results and keep a list of the medicines you take. How can you care for yourself at home? · Do not try to remove earwax with cotton swabs, fingers, or other objects. This can make the blockage worse and damage the eardrum. · If your doctor recommends that you try to remove earwax at home:  ¨ Soften and loosen the earwax with warm mineral oil. You also can try hydrogen peroxide mixed with an equal amount of room temperature water. Place 2 drops of the fluid, warmed to body temperature, in the ear two times a day for up to 5 days. ¨ Once the wax is loose and soft, all that is usually needed to remove it from the ear canal is a gentle, warm shower. Direct the water into the ear, then tip your head to let the earwax drain out. Dry your ear thoroughly with a hair dryer set on low. Hold the dryer several inches from your ear. ¨ If the warm mineral oil and shower do not work, use an over-the-counter wax softener followed by gentle flushing with an ear syringe each night for a week or two. Make sure the flushing solution is body temperature. Cool or hot fluids in the ear can cause dizziness. When should you call for help? Call your doctor now or seek immediate medical care if:  ? · Pus or blood drains from your ear. ? · Your ears are ringing or feel full. ? · You have a loss of hearing. ? Watch closely for changes in your health, and be sure to contact your doctor if:  ? · You have pain or reduced hearing after 1 week of home treatment. ? · You have any new symptoms, such as nausea or balance problems. Where can you learn more? Go to http://janina-luiz.info/. Enter F617 in the search box to learn more about \"Earwax Blockage: Care Instructions. \"  Current as of: March 20, 2017  Content Version: 11.4  © 8446-2486 The Sea App. Care instructions adapted under license by [employer] (which disclaims liability or warranty for this information). If you have questions about a medical condition or this instruction, always ask your healthcare professional. [address] [address] any warranty or liability for your use of this information.

## 2018-05-07 NOTE — MR AVS SNAPSHOT
[address] [address] [phone] Patient: Zeynep Matamoros MRN: [id number] KFI:3/3/2020 Visit Information Date & Time Provider Department Dept. Phone Encounter #  
 5/7/2018  1:15 PM DI Escobar [phone] [id number] Your Appointments 6/14/2018  9:50 AM  
Follow Up with MD Carmelina Slade Romeo ([facility]) Appt Note: [address] [place] [address] [address] [address] [facility] [address] [address] 8/17/2018  3:40 PM  
Follow Up with Davie Manjarrez MD  
[facility]rology [facility] at [facility]) Appt Note: follow up $ 0 CP jll 2/13/18  
 [address] [place] [address], 
[address] [place] [address], [address] [address]  
[address] [facility], [address] [place] [address], [address] [address] Upcoming Health Maintenance Date Due Hepatitis C Screening 1945 DTaP/Tdap/Td series (1 - Tdap) 5/1/1966 FOBT Q 1 YEAR AGE 50-75 5/1/1995 ZOSTER VACCINE AGE 60> 3/1/2005 GLAUCOMA SCREENING Q2Y 5/1/2010 Bone Densitometry (Dexa) Screening 5/1/2010 MEDICARE YEARLY EXAM 3/14/2018 Influenza Age 5 to Adult 8/1/2018 BREAST CANCER SCRN MAMMOGRAM 3/9/2020 Allergies as of 5/7/2018  Review Complete On: 5/7/2018 By: Tiffany Friedman NP Severity Noted Reaction Type Reactions Codeine Medium 04/07/2011   Side Effect Nausea and Vomiting Current Immunizations  Reviewed on 11/29/2017 Name Date Influenza Vaccine Split 10/26/2011 Pneumococcal Conjugate (PCV-13) 11/12/2015 ZZZ-RETIRED (DO NOT USE) Pneumococcal Vaccine (Unspecified Type) 11/9/2010 Not reviewed this visit You Were Diagnosed With   
  
 Codes Comments Hearing loss due to cerumen impaction, right    -  Primary ICD-10-CM: H61.21 ICD-9-CM: 389.8, 380.4 Vitals BP Pulse Height(growth percentile) Weight(growth percentile) LMP SpO2  
 137/85 (BP 1 Location: Left arm, BP Patient Position: Sitting) 87 5' 3\" (1.6 m) 174 lb (78.9 kg) (LMP Unknown) 96% BMI OB Status Smoking Status 30.82 kg/m2 Hysterectomy Never Smoker Vitals History BMI and BSA Data Body Mass Index Body Surface Area  
 30.82 kg/m 2 1.87 m 2 Preferred Pharmacy Pharmacy Name Phone [phone] Your Updated Medication List  
  
   
This list is accurate as of 5/7/18  2:08 PM.  Always use your most recent med list.  
  
  
  
  
 amitriptyline 25 mg tablet Commonly known as:  ELAVIL Take 5 tablets at night for a month then 4 tablets at night for a month then 3 tablets at night for a month and then 2 tablets at night  Indications: NEUROPATHIC PAIN  
  
 CLINPRO 5000 1.1 % Pste Generic drug:  fluoride (sodium) by Dental route. levothyroxine 125 mcg tablet Commonly known as:  SYNTHROID  
TAKE 1 (ONE) TABLET, ORAL, DAILY FOR THYROID  
  
 metoprolol succinate 25 mg XL tablet Commonly known as:  TOPROL-XL Take  by mouth daily. pilocarpine 5 mg tablet Commonly known as:  SALAGEN  
TAKE 1 TABLET BY MOUTH 4 TIMES A DAY PREMARIN 0.625 mg tablet Generic drug:  conjugated estrogens Take 0.625 mg by mouth. PriLOSEC 20 mg capsule Generic drug:  omeprazole Take 20 mg by mouth daily. simvastatin 20 mg tablet Commonly known as:  ZOCOR  
TAKE 1 TABLET EVERY DAY  
  
 VOLTAREN 1 % Gel Generic drug:  diclofenac Apply  to affected area four (4) times daily. Patient Instructions Earwax Blockage: Care Instructions Your Care Instructions Earwax is a natural substance that protects the ear canal. Normally, earwax drains from the ears and does not cause problems. Sometimes earwax builds up and hardens. Earwax blockage (also called cerumen impaction) can cause some loss of hearing and pain. When wax is tightly packed, you will need to have your doctor remove it. Follow-up care is a key part of your treatment and safety. Be sure to make and go to all appointments, and call your doctor if you are having problems. It's also a good idea to know your test results and keep a list of the medicines you take. How can you care for yourself at home? · Do not try to remove earwax with cotton swabs, fingers, or other objects. This can make the blockage worse and damage the eardrum. · If your doctor recommends that you try to remove earwax at home: ¨ Soften and loosen the earwax with warm mineral oil. You also can try hydrogen peroxide mixed with an equal amount of room temperature water. Place 2 drops of the fluid, warmed to body temperature, in the ear two times a day for up to 5 days. ¨ Once the wax is loose and soft, all that is usually needed to remove it from the ear canal is a gentle, warm shower. Direct the water into the ear, then tip your head to let the earwax drain out. Dry your ear thoroughly with a hair dryer set on low. Hold the dryer several inches from your ear. ¨ If the warm mineral oil and shower do not work, use an over-the-counter wax softener followed by gentle flushing with an ear syringe each night for a week or two. Make sure the flushing solution is body temperature. Cool or hot fluids in the ear can cause dizziness. When should you call for help? Call your doctor now or seek immediate medical care if: 
? · Pus or blood drains from your ear. ? · Your ears are ringing or feel full. ? · You have a loss of hearing. ? Watch closely for changes in your health, and be sure to contact your doctor if: 
? · You have pain or reduced hearing after 1 week of home treatment. ? · You have any new symptoms, such as nausea or balance problems. Where can you learn more? Go to http://janina-luiz.info/. Enter Y729 in the search box to learn more about \"Earwax Blockage: Care Instructions. \" Current as of: March 20, 2017 Content Version: 11.4 © 2166-0615 edenes. Care instructions adapted under license by [employer] (which disclaims liability or warranty for this information). If you have questions about a medical condition or this instruction, always ask your healthcare professional. [address] [address] any warranty or liability for your use of this information. Introducing [facility] SERVICES! [address] introduces [employer] patient portal. Now you can access parts of your medical record, email your doctor's office, and request medication refills online. 1. In your internet browser, go to [email or web address] 2. Click on the First Time User? Click Here link in the Sign In box. You will see the New Member Sign Up page. 3. Enter your [employer] Access Code exactly as it appears below. You will not need to use this code after youve completed the sign-up process. If you do not sign up before the expiration date, you must request a new code. · [employer] Access Code: [email or web address] Expires: 6/7/2018 10:20 AM 
 
4. Enter the last four digits of your Social Security Number (xxxx) and Date of Birth (mm/dd/yyyy) as indicated and click Submit. You will be taken to the next sign-up page. 5. Create a TearSolutionst ID. This will be your [employer] login ID and cannot be changed, so think of one that is secure and easy to remember. 6. Create a TearSolutionst password. You can change your password at any time. 7. Enter your Password Reset Question and Answer. This can be used at a later time if you forget your password. 8. Enter your e-mail address.  You will receive e-mail notification when new information is available in MusicSiren. 9. Click Sign Up. You can now view and download portions of your medical record. 10. Click the Download Summary menu link to download a portable copy of your medical information. If you have questions, please visit the Frequently Asked Questions section of the MusicSiren website. Remember, MusicSiren is NOT to be used for urgent needs. For medical emergencies, dial 911. Now available from your iPhone and Android! Please provide this summary of care documentation to your next provider. Your primary care clinician is listed as ANNMARIE Aponte. If you have any questions after today's visit, please call [phone].

## 2018-05-07 NOTE — PROGRESS NOTES
Subjective:  Ms. Hailey Plunkett is a pleasant 68year old lady who comes in today with a 4-5 day history of feeling fullness in her right ear. Denies any pain. Denies any nasal congestion or postnasal drainage. Denies any chills or fever. She tells me that in the past she's had to have wax removed from her ears on multiple occasions.     Past Medical History:   Diagnosis Date    Adult onset hypothyroidism 10/19/2017    Arthritis     feet and hands    Back pain with radiation 10/19/2017    Breast cyst 6 months ago     right breast     Disorder of bone and cartilage 10/19/2017    GERD (gastroesophageal reflux disease)     High risk medication use 10/19/2017    Hip pain, acute, left 10/19/2017    hypercholesteremia     elevatd cholesterol    Hyperlipidemia 10/19/2017    Hypothyroid     hypothyroid    Hypothyroid 10/19/2017    Irritable bowel syndrome with constipation 10/19/2017    Knee pain 10/19/2017    Menopausal disorder 10/19/2017    Migraine 10/19/2017    Migraines     Neurological disorder     migraines    Sjoegren syndrome ([address]     dry eyes    Sjogren's syndrome ([address] 10/19/2017    Urinary frequency 10/19/2017     Past Surgical History:   Procedure Laterality Date    ABDOMEN SURGERY PROC UNLISTED  2011    removed \"sludge\" from duct in gallbladder    HX BREAST BIOPSY Left 11 years ago     negative    HX GYN      hysterectomy    HX ORTHOPAEDIC  11/9/11    SPINE LUMBAR LAMINECTOMY - L4-5 LAMINECTOMY FOR REMOVAL OF EXTRA DURAL LESION ON THE LEFT    HX OTHER SURGICAL      ganglion cyst removed finger       Current Outpatient Prescriptions on File Prior to Visit   Medication Sig Dispense Refill    amitriptyline (ELAVIL) 25 mg tablet Take 5 tablets at night for a month then 4 tablets at night for a month then 3 tablets at night for a month and then 2 tablets at night  Indications: NEUROPATHIC PAIN 150 Tab 5    simvastatin (ZOCOR) 20 mg tablet TAKE 1 TABLET EVERY DAY 90 Tab 3    pilocarpine (SALAGEN) 5 mg tablet TAKE 1 TABLET BY MOUTH 4 TIMES A  Tab 3    levothyroxine (SYNTHROID) 125 mcg tablet TAKE 1 (ONE) TABLET, ORAL, DAILY FOR THYROID 90 Tab 3    omeprazole (PRILOSEC) 20 mg capsule Take 20 mg by mouth daily.  fluoride, sodium, (CLINPRO 5000) 1.1 % pste by Dental route.  diclofenac (VOLTAREN) 1 % gel Apply  to affected area four (4) times daily.  metoprolol succinate (TOPROL-XL) 25 mg XL tablet Take  by mouth daily.  conjugated estrogens (PREMARIN) 0.625 mg tablet Take 0.625 mg by mouth. No current facility-administered medications on file prior to visit. Allergies   Allergen Reactions    Codeine Nausea and Vomiting   Physical Examination:  GENERAL:  Pleasant lady in no acute distress. She is alert and oriented. VITALS:  BP: 146/88. P: 81.  O2 sat: 97%. NECK:  Supple without adenopathy. CHEST:  Lungs clear to auscultation. CV:  Heart regular rhythm without murmur. EXTREMITIES:  No edema or calf tenderness. Distal pulses were present. Impression:  1. Persistently elevated blood pressure. 2. Chronic depression/anxiety. 3. Palpitations. Plan:  1. I have opted to start her on lisinopril 5 mg daily. I will recheck her blood pressure in two weeks. She will call me should she have any concerns prior to that appointment. 2. In terms of her depression and anxiety, I am referring her to Sanket Houston, psychiatric nurse practitioner. I did ask her to take half of a Xanax in the morning and see if it can help some of her symptoms. 3. Additionally today I do want to check her thyroid function, basic metabolic and a CBC. I will call her as soon as I have the results.

## 2018-05-07 NOTE — PROGRESS NOTES
Ssuan Hirsch presents with   Chief Complaint   Patient presents with    Ear Fullness     right   Patient here with complaint of right ear fullness and pain for about 5-6 days. No fever or chills noted. 1. Have you been to the ER, urgent care clinic since your last visit? Hospitalized since your last visit? No    2. Have you seen or consulted any other health care providers outside of the [facility] since your last visit? Include any pap smears or colon screening.  No

## 2018-05-31 RX ORDER — OMEPRAZOLE 20 MG/1
20 TABLET, DELAYED RELEASE ORAL DAILY
COMMUNITY

## 2018-06-01 ENCOUNTER — ANESTHESIA EVENT (OUTPATIENT)
Dept: ENDOSCOPY | Age: 73
End: 2018-06-01
Payer: MEDICARE

## 2018-06-01 ENCOUNTER — ANESTHESIA (OUTPATIENT)
Dept: ENDOSCOPY | Age: 73
End: 2018-06-01
Payer: MEDICARE

## 2018-06-01 ENCOUNTER — HOSPITAL ENCOUNTER (OUTPATIENT)
Age: 73
Setting detail: OUTPATIENT SURGERY
Discharge: HOME | End: 2018-06-01
Attending: COLON & RECTAL SURGERY | Admitting: COLON & RECTAL SURGERY
Payer: MEDICARE

## 2018-06-01 VITALS
HEART RATE: 76 BPM | WEIGHT: 174 LBS | HEIGHT: 63 IN | BODY MASS INDEX: 30.83 KG/M2 | TEMPERATURE: 97.7 F | OXYGEN SATURATION: 95 % | DIASTOLIC BLOOD PRESSURE: 87 MMHG | RESPIRATION RATE: 16 BRPM | SYSTOLIC BLOOD PRESSURE: 143 MMHG

## 2018-06-01 PROCEDURE — 76040000019: Performed by: COLON & RECTAL SURGERY

## 2018-06-01 PROCEDURE — 74011250636 HC RX REV CODE- 250/636

## 2018-06-01 PROCEDURE — 77030027957 HC TBNG IRR ENDOGTR BUSS -B: Performed by: COLON & RECTAL SURGERY

## 2018-06-01 PROCEDURE — 74011000250 HC RX REV CODE- 250

## 2018-06-01 PROCEDURE — 76060000031 HC ANESTHESIA FIRST 0.5 HR: Performed by: COLON & RECTAL SURGERY

## 2018-06-01 RX ORDER — DEXTROMETHORPHAN/PSEUDOEPHED 2.5-7.5/.8
1.2 DROPS ORAL
Status: DISCONTINUED | OUTPATIENT
Start: 2018-06-01 | End: 2018-06-01 | Stop reason: HOSPADM

## 2018-06-01 RX ORDER — SODIUM CHLORIDE 9 MG/ML
50 INJECTION, SOLUTION INTRAVENOUS CONTINUOUS
Status: DISCONTINUED | OUTPATIENT
Start: 2018-06-01 | End: 2018-06-01 | Stop reason: HOSPADM

## 2018-06-01 RX ORDER — PROPOFOL 10 MG/ML
INJECTION, EMULSION INTRAVENOUS AS NEEDED
Status: DISCONTINUED | OUTPATIENT
Start: 2018-06-01 | End: 2018-06-01 | Stop reason: HOSPADM

## 2018-06-01 RX ORDER — NALOXONE HYDROCHLORIDE 0.4 MG/ML
0.4 INJECTION, SOLUTION INTRAMUSCULAR; INTRAVENOUS; SUBCUTANEOUS
Status: DISCONTINUED | OUTPATIENT
Start: 2018-06-01 | End: 2018-06-01 | Stop reason: HOSPADM

## 2018-06-01 RX ORDER — EPINEPHRINE 0.1 MG/ML
1 INJECTION INTRACARDIAC; INTRAVENOUS
Status: DISCONTINUED | OUTPATIENT
Start: 2018-06-01 | End: 2018-06-01 | Stop reason: HOSPADM

## 2018-06-01 RX ORDER — SODIUM CHLORIDE 0.9 % (FLUSH) 0.9 %
5-10 SYRINGE (ML) INJECTION AS NEEDED
Status: DISCONTINUED | OUTPATIENT
Start: 2018-06-01 | End: 2018-06-01 | Stop reason: HOSPADM

## 2018-06-01 RX ORDER — FLUMAZENIL 0.1 MG/ML
0.2 INJECTION INTRAVENOUS
Status: DISCONTINUED | OUTPATIENT
Start: 2018-06-01 | End: 2018-06-01 | Stop reason: HOSPADM

## 2018-06-01 RX ORDER — ATROPINE SULFATE 0.1 MG/ML
0.5 INJECTION INTRAVENOUS
Status: DISCONTINUED | OUTPATIENT
Start: 2018-06-01 | End: 2018-06-01 | Stop reason: HOSPADM

## 2018-06-01 RX ORDER — SODIUM CHLORIDE 9 MG/ML
INJECTION, SOLUTION INTRAVENOUS
Status: DISCONTINUED | OUTPATIENT
Start: 2018-06-01 | End: 2018-06-01 | Stop reason: HOSPADM

## 2018-06-01 RX ORDER — SODIUM CHLORIDE 0.9 % (FLUSH) 0.9 %
5-10 SYRINGE (ML) INJECTION EVERY 8 HOURS
Status: DISCONTINUED | OUTPATIENT
Start: 2018-06-01 | End: 2018-06-01 | Stop reason: HOSPADM

## 2018-06-01 RX ORDER — LIDOCAINE HYDROCHLORIDE 20 MG/ML
INJECTION, SOLUTION EPIDURAL; INFILTRATION; INTRACAUDAL; PERINEURAL AS NEEDED
Status: DISCONTINUED | OUTPATIENT
Start: 2018-06-01 | End: 2018-06-01 | Stop reason: HOSPADM

## 2018-06-01 RX ADMIN — PROPOFOL 20 MG: 10 INJECTION, EMULSION INTRAVENOUS at 11:15

## 2018-06-01 RX ADMIN — PROPOFOL 90 MG: 10 INJECTION, EMULSION INTRAVENOUS at 11:14

## 2018-06-01 RX ADMIN — PROPOFOL 60 MG: 10 INJECTION, EMULSION INTRAVENOUS at 11:22

## 2018-06-01 RX ADMIN — LIDOCAINE HYDROCHLORIDE 40 MG: 20 INJECTION, SOLUTION EPIDURAL; INFILTRATION; INTRACAUDAL; PERINEURAL at 11:14

## 2018-06-01 RX ADMIN — SODIUM CHLORIDE: 9 INJECTION, SOLUTION INTRAVENOUS at 11:13

## 2018-06-01 RX ADMIN — PROPOFOL 70 MG: 10 INJECTION, EMULSION INTRAVENOUS at 11:18

## 2018-06-01 NOTE — OP NOTES
Colonoscopy Procedure Note    Indications: Previous adenomatous polyp    Anesthesia/Sedation: MAC anesthesia Propofol    Pre-Procedure Exam:  Airway: clear   Heart: normal S1and S2    Lungs: clear bilateral  Abdomen: soft, nontender, bowel sounds present and normal in all quadrants   Mental Status: awake, alert, and oriented to person, place, and time      Procedure in Detail:  Informed consent was obtained for the procedure, including sedation. Risks of perforation, hemorrhage, adverse drug reaction, and aspiration were discussed. The patient was placed in the left lateral decubitus position. Based on the pre-procedure assessment, including review of the patient's medical history, medications, allergies, and review of systems, she had been deemed to be an appropriate candidate for moderate sedation; she was therefore sedated with the medications listed above. The patient was monitored continuously with ECG tracing, pulse oximetry, blood pressure monitoring, and direct observations. A rectal examination was performed. The [id number] was inserted into the rectum and advanced under direct vision to the cecum, which was identified by the ileocecal valve and appendiceal orifice. The quality of the colonic preparation was excellent. A careful inspection was made as the colonoscope was withdrawn, including a retroflexed view of the rectum; findings and interventions are described below. Appropriate photodocumentation was obtained. Findings:   ANUS: Anal exam reveals no masses or hemorrhoids, sphincter tone is normal.   RECTUM: Rectal exam reveals no masses or hemorrhoids. SIGMOID COLON: The mucosa is normal with good vascular pattern and without ulcers, diverticula, and polyps. DESCENDING COLON: The mucosa is normal with good vascular pattern and without ulcers, diverticula, and polyps.    SPLENIC FLEXURE: The splenic flexure is normal.   TRANSVERSE COLON: The mucosa is normal with good vascular pattern and without ulcers, diverticula, and polyps. HEPATIC FLEXURE: The hepatic flexure is normal.   ASCENDING COLON: The mucosa is normal with good vascular pattern and without ulcers, diverticula, and polyps. CECUM: The appendiceal orifice appears normal. The ileocecal valve appears normal.   TERMINAL ILEUM: The terminal ileum was not entered. Specimens: No specimens were collected. EBL: None    Complications: None; patient tolerated the procedure well. Attending Attestation: I performed the procedure. Recommendations:   - Repeat colonoscopy in 5 years.     Signed By: Joanne Ochoa MD                        June 1, 2018

## 2018-06-01 NOTE — ANESTHESIA PREPROCEDURE EVALUATION
Anesthetic History   No history of anesthetic complications            Review of Systems / Medical History  Patient summary reviewed, nursing notes reviewed and pertinent labs reviewed    Pulmonary  Within defined limits                 Neuro/Psych   Within defined limits           Cardiovascular  Within defined limits                     GI/Hepatic/Renal  Within defined limits   GERD           Endo/Other  Within defined limits    Hypothyroidism  Arthritis     Other Findings              Physical Exam    Airway  Mallampati: II  TM Distance: > 6 cm  Neck ROM: normal range of motion   Mouth opening: Normal     Cardiovascular  Regular rate and rhythm,  S1 and S2 normal,  no murmur, click, rub, or gallop             Dental  No notable dental hx       Pulmonary  Breath sounds clear to auscultation               Abdominal  GI exam deferred       Other Findings            Anesthetic Plan    ASA: 2  Anesthesia type: MAC          Induction: Intravenous  Anesthetic plan and risks discussed with: Patient

## 2018-06-01 NOTE — DISCHARGE INSTRUCTIONS
Essie Good  [id number]  1945    COLON DISCHARGE INSTRUCTIONS  Discomfort:  Redness at IV site- apply warm compress to area; if redness or soreness persist- contact your physician  There may be a slight amount of blood passed from the rectum  Gaseous discomfort- walking, belching will help relieve any discomfort  You may not operate a vehicle for 12 hours  You may not engage in an occupation involving machinery or appliances for rest of today  You may not drink alcoholic beverages for at least 12 hours  Avoid making any critical decisions for at least 24 hour  DIET:   High fiber diet. - however -  remember your colon is empty and a heavy meal will produce gas. Avoid these foods:  vegetables, fried / greasy foods, carbonated drinks for today    MEDICATIONS:  resume       ACTIVITY:  You may resume your normal daily activities it is recommended that you spend the remainder of the day resting -  avoid any strenuous activity. CALL M.D. ANY SIGN OF:   Increasing pain, nausea, vomiting  Abdominal distension (swelling)  New increased bleeding (oral or rectal)  Fever (chills)  Pain in chest area  Bloody discharge from nose or mouth  Shortness of breath     Follow-up Instructions:   Call Anaid Llamas MD if any questions or problems. Telephone # [phone]  Biopsy results will be available in  7 to10 days  Should have a repeat colonoscopy in 5 years. COLONOSCOPY FINDINGS:  Your colonoscopy showed: normal colon.

## 2018-06-01 NOTE — IP AVS SNAPSHOT
[address] 
[phone] Patient: Essie Good MRN: [id number] AAT:5[birth year] About your hospitalization You were admitted on:  June 1, 2018 [facility] You were discharged on:  June 1, 2018 Why you were hospitalized Your primary diagnosis was:  Not on File Follow-up Information Follow up With Details Comments Contact Info MD Keira Cota [address] [facility] 
[phone] Your Scheduled Appointments Thursday June 14, 2018  9:50 AM EDT Follow Up with MD Carmelina Cota [address] ([facility]) [address]ira [address] [address] [phone] Discharge Orders None A check bucky indicates which time of day the medication should be taken. My Medications CHANGE how you take these medications Instructions Each Dose to Equal  
 Morning Noon Evening Bedtime  
 amitriptyline 25 mg tablet Commonly known as:  ELAVIL What changed:   
- how much to take 
- additional instructions Your last dose was: Your next dose is: Take 5 tablets at night for a month then 4 tablets at night for a month then 3 tablets at night for a month and then 2 tablets at night  Indications: [address] [address] taking these medications Instructions Each Dose to Equal  
 Morning Noon Evening Bedtime  
 levothyroxine 125 mcg tablet Commonly known as:  SYNTHROID Your last dose was: Your next dose is: TAKE 1 (ONE) TABLET, ORAL, DAILY FOR THYROID  
     
   
   
   
  
 pilocarpine 5 mg tablet Commonly known as:  Alen Salinas Your last dose was: Your next dose is:  TAKE 1 TABLET BY MOUTH 4 TIMES A DAY  
     
   
   
   
  
 PriLOSEC OTC 20 mg tablet Generic drug:  omeprazole Your last dose was: Your next dose is: Take 20 mg by mouth daily. 20 mg  
    
   
   
   
  
 simvastatin 20 mg tablet Commonly known as:  ZOCOR Your last dose was: Your next dose is: TAKE 1 TABLET EVERY DAY Discharge Instructions Kevan Kunz [id number] 1945 COLON DISCHARGE INSTRUCTIONS Discomfort: 
Redness at IV site- apply warm compress to area; if redness or soreness persist- contact your physician There may be a slight amount of blood passed from the rectum Gaseous discomfort- walking, belching will help relieve any discomfort You may not operate a vehicle for 12 hours You may not engage in an occupation involving machinery or appliances for rest of today You may not drink alcoholic beverages for at least 12 hours Avoid making any critical decisions for at least 24 hour DIET: 
 High fiber diet.  however -  remember your colon is empty and a heavy meal will produce gas. Avoid these foods:  vegetables, fried / greasy foods, carbonated drinks for today MEDICATIONS: 
resume ACTIVITY: 
You may resume your normal daily activities it is recommended that you spend the remainder of the day resting -  avoid any strenuous activity. CALL M.D. ANY SIGN OF: Increasing pain, nausea, vomiting Abdominal distension (swelling) New increased bleeding (oral or rectal) Fever (chills) Pain in chest area Bloody discharge from nose or mouth Shortness of breath Follow-up Instructions: 
 Call Marbin Lazo MD if any questions or problems. Telephone # [phone] Biopsy results will be available in  7 to10 days Should have a repeat colonoscopy in 5 years. COLONOSCOPY FINDINGS: 
Your colonoscopy showed: normal colon. ACO Transitions of Care Introducing [employer] [address] offers a voluntary care coordination program to provide high quality service and care to [facility] fee-for-service beneficiaries. Noa Jeandeana was designed to help you enhance your health and well-being through the following services: ? Transitions of Care  support for individuals who are transitioning from one care setting to another (example: Hospital to home). ? Chronic and Complex Care Coordination  support for individuals and caregivers of those with serious or chronic illnesses or with more than one chronic (ongoing) condition and those who take a number of different medications. If you meet specific medical criteria, a [address] [place] [address] Rd may call you directly to coordinate your care with your primary care physician and your other care providers. For questions about the [facility]) programs, please, contact your physicians office. For general questions or additional information about Accountable Care Organizations: 
Please visit www.medicare.gov/acos. html or call 1-800-MEDICARE (2-557.928.7188) TTY users should call 8-240.897.2281. Introducing [facility]! [employer] introduces [employer] patient portal. Now you can access parts of your medical record, email your doctor's office, and request medication refills online. 1. In your internet browser, go to [email or web address] "[email or web address] 2. Click on the First Time User? Click Here link in the Sign In box. You will see the New Member Sign Up page. 3. Enter your [employer] Access Code exactly as it appears below. You will not need to use this code after youve completed the sign-up process. If you do not sign up before the expiration date, you must request a new code. · [employer] Access Code: [email or web address] Expires: 6/7/2018 10:20 AM 
 
4.  Enter the last four digits of your Social Security Number (xxxx) and Date of Birth (mm/dd/yyyy) as indicated and click Submit. You will be taken to the next sign-up page. 5. Create a [employer] ID. This will be your [employer] login ID and cannot be changed, so think of one that is secure and easy to remember. 6. Create a [employer] password. You can change your password at any time. 7. Enter your Password Reset Question and Answer. This can be used at a later time if you forget your password. 8. Enter your e-mail address. You will receive e-mail notification when new information is available in [address] 9. Click Sign Up. You can now view and download portions of your medical record. 10. Click the Download Summary menu link to download a portable copy of your medical information. If you have questions, please visit the Frequently Asked Questions section of the [employer] website. Remember, [employer] is NOT to be used for urgent needs. For medical emergencies, dial 911. Now available from your iPhone and Android! Introducing Sukhi Johnson As a [employer] patient, I wanted to make you aware of our electronic visit tool called Sukhi Johnson. [employer] 24/7 allows you to connect within minutes with a medical provider 24 hours a day, seven days a week via a mobile device or tablet or logging into a secure website from your computer. You can access Sukhi Johnson from anywhere in the [place]. A virtual visit might be right for you when you have a simple condition and feel like you just dont want to get out of bed, or cant get away from work for an appointment, when your regular [employer] provider is not available (evenings, weekends or holidays), or when youre out of town and need minor care. Electronic visits cost only $49 and if the [employer] 24/7 provider determines a prescription is needed to treat your condition, one can be electronically transmitted to a nearby pharmacy*. Please take a moment to enroll today if you have not already done so. The enrollment process is free and takes just a few minutes. To enroll, please download the [email or web address] 24/7 wang to your tablet or phone, or visit [email or web address] to enroll on your computer. And, as an [address] [place] [address] patient with a [employer] account, the results of your visits will be scanned into your electronic medical record and your primary care provider will be able to view the scanned results. We urge you to continue to see your regular Natividad Spence provider for your ongoing medical care. And while your primary care provider may not be the one available when you seek a Revverilanaf[employer] virtual visit, the peace of mind you get from getting a real diagnosis real time can be priceless. For more information on InvenQuery, view our Frequently Asked Questions (FAQs) at [email or web address]. Sincerely, 
 
Jame Aldridge MD 
Chief Medical Officer [address] [place] [address] Robin *:  certain medications cannot be prescribed via [employer]ven[employer] Providers Seen During Your Hospitalization Provider Specialty Primary office phone Keegan Bhatt MD Colon and Rectal Surgery [phone] Your Primary Care Physician (PCP) Primary Care Physician Office Phone Office Fax Lilo Rodriguez  You are allergic to the following Allergen Reactions Latex Rash Bandages causes a rash if left on for more than a couple of days. She buys latex free band-aids. Codeine Nausea and Vomiting Recent Documentation Height Weight Breastfeeding? BMI OB Status Smoking Status 1.6 m 78.9 kg No 30.82 kg/m2 Hysterectomy Never Smoker Emergency Contacts Name Discharge Info Relation Home Work Mobile [email or web address] 45 CAREGIVER [3] Spouse [3] [phone] Patient Belongings The following personal items are in your possession at time of discharge: 
  Dental Appliances: None  Visual Aid: None Please provide this summary of care documentation to your next provider. Signatures-by signing, you are acknowledging that this After Visit Summary has been reviewed with you and you have received a copy. Patient Signature:  ____________________________________________________________ Date:  ____________________________________________________________  
  
Cydney Piety Provider Signature:  ____________________________________________________________ Date:  ____________________________________________________________

## 2018-06-01 NOTE — ROUTINE PROCESS
Radha Barber  1945  [id number]    Situation:  Verbal report received from: BRETT Falcon RN  Procedure: Procedure(s):  COLONOSCOPY    Background:    Preoperative diagnosis: HISTORY OF POLYPS  Postoperative diagnosis: 1. Internal Hemorrhoids    :  Dr. Ava Hallman  Assistant(s): Endoscopy Technician-1: Yeni Alvarez  Endoscopy RN-1: Alanis Mendoza    Specimens: * No specimens in log *  H. Pylori  no    Assessment:  Intra-procedure medications   Anesthesia gave intra-procedure sedation and medications, see anesthesia flow sheet yes    Intravenous fluids: NS@ KVO     Vital signs stable     Abdominal assessment: round and soft     Recommendation:  Discharge patient per MD order.     Family or Friend   Permission to share finding with family or friend yes

## 2018-06-01 NOTE — ANESTHESIA POSTPROCEDURE EVALUATION
Post-Anesthesia Evaluation and Assessment    Patient: Zeynep Matamoros MRN: [id number]  SSN: [id number]    YOB: 1945  Age: 68 y.o. Sex: female       Cardiovascular Function/Vital Signs  Visit Vitals    /87    Pulse 76    Temp 36.5 °C (97.7 °F)    Resp 16    Ht 5' 3\" (1.6 m)    Wt 78.9 kg (174 lb)    SpO2 95%    Breastfeeding No    BMI 30.82 kg/m2       Patient is status post MAC anesthesia for Procedure(s):  COLONOSCOPY. Nausea/Vomiting: None    Postoperative hydration reviewed and adequate. Pain:  Pain Scale 1: Numeric (0 - 10) (06/01/18 1154)  Pain Intensity 1: 0 (06/01/18 1154)   Managed    Neurological Status: At baseline    Mental Status and Level of Consciousness: Arousable    Pulmonary Status:   O2 Device: Room air (06/01/18 1154)   Adequate oxygenation and airway patent    Complications related to anesthesia: None    Post-anesthesia assessment completed.  No concerns    Signed By: Kurtis Carreno MD     June 1, 2018

## 2018-06-01 NOTE — BRIEF OP NOTE
BRIEF OPERATIVE NOTE    Date of Procedure: 6/1/2018   Preoperative Diagnosis: HISTORY OF POLYPS  Postoperative Diagnosis: 1. Internal Hemorrhoids    Procedure(s):  COLONOSCOPY  Surgeon(s) and Role:     * Ghazala Andrew MD - Primary         Surgical Assistant:     Surgical Staff:  Endoscopy Technician-1: Magen Gonzalez  Endoscopy RN-1: Vinh Street  No case tracking events are documented in the log.   Anesthesia: MAC   Estimated Blood Loss: none  Specimens: * No specimens in log *   Findings: normal colon   Complications: none apparent  Implants: * No implants in log *

## 2018-06-01 NOTE — H&P
Colon and Rectal Surgery History and Physical    Subjective:      Tyrone Lynch is a 68 y.o. female who has hx polyps on colonoscopy 2013    Patient Active Problem List    Diagnosis Date Noted    Pure hypercholesterolemia 11/29/2017    Menopausal disorder 10/19/2017    Irritable bowel syndrome with constipation 10/19/2017    High risk medication use 10/19/2017    Urinary frequency 10/19/2017    Sjogren's syndrome ([address] 10/19/2017    Adult onset hypothyroidism 10/19/2017    Disorder of bone and cartilage 10/19/2017    Back pain with radiation 10/19/2017    Hip pain, acute, left 10/19/2017    Migraine 10/19/2017    Knee pain 10/19/2017    Hypothyroid 10/19/2017    Gait difficulty 08/14/2017    Leukocytosis 04/08/2011     Past Medical History:   Diagnosis Date    Adult onset hypothyroidism 10/19/2017    Arthritis     feet and hands    Back pain with radiation 10/19/2017    Breast cyst 6 months ago     right breast     Disorder of bone and cartilage 10/19/2017    GERD (gastroesophageal reflux disease)     High risk medication use 10/19/2017    Hip pain, acute, left 10/19/2017    hypercholesteremia     elevatd cholesterol    Hyperlipidemia 10/19/2017    Hypothyroid     hypothyroid    Hypothyroid 10/19/2017    Irritable bowel syndrome with constipation 10/19/2017    Knee pain 10/19/2017    Menopausal disorder 10/19/2017    Migraine 10/19/2017    Migraines     Neurological disorder     migraines    Sjoegren syndrome ([address]     dry eyes    Sjogren's syndrome ([address] 10/19/2017    pt states she was tested for this and it was not sjogrens    Urinary frequency 10/19/2017      Past Surgical History:   Procedure Laterality Date    ABDOMEN SURGERY PROC UNLISTED  2011    removed \"sludge\" from duct in gallbladder    HX BREAST BIOPSY Left 11 years ago     negative    HX GYN      hysterectomy    HX ORTHOPAEDIC  11/9/11    SPINE LUMBAR LAMINECTOMY - L4-5 LAMINECTOMY FOR REMOVAL OF EXTRA DURAL LESION ON THE LEFT - benign    HX OTHER SURGICAL      ganglion cyst removed finger    HX TONSILLECTOMY        Social History   Substance Use Topics    Smoking status: Never Smoker    Smokeless tobacco: Never Used    Alcohol use Yes      Comment: rarely      Family History   Problem Relation Age of Onset    Heart Disease Mother      \"shrinking heart valve\"      Prior to Admission medications    Medication Sig Start Date End Date Taking? Authorizing Provider   omeprazole (PRILOSEC OTC) 20 mg tablet Take 20 mg by mouth daily. Yes Historical Provider   amitriptyline (ELAVIL) 25 mg tablet Take 5 tablets at night for a month then 4 tablets at night for a month then 3 tablets at night for a month and then 2 tablets at night  Indications: NEUROPATHIC PAIN  Patient taking differently: 50 mg. Take 5 tablets at night for a month then 4 tablets at night for a month then 3 tablets at night for a month and then 2 tablets at night  Indications: NEUROPATHIC PAIN 2/13/18  Yes Dirk Laughlin MD   simvastatin (ZOCOR) 20 mg tablet TAKE 1 TABLET EVERY DAY 1/16/18  Yes ANNMARIE Velasco MD   pilocarpine (SALAGEN) 5 mg tablet TAKE 1 TABLET BY MOUTH 4 TIMES A DAY 1/5/18  Yes ANNMARIE Velasco MD   levothyroxine (SYNTHROID) 125 mcg tablet TAKE 1 (ONE) TABLET, ORAL, DAILY FOR THYROID 12/11/17  Yes Cecelia Aleman MD     Allergies   Allergen Reactions    Latex Rash     Bandages causes a rash if left on for more than a couple of days. She buys latex free band-aids.  Codeine Nausea and Vomiting        Review of Systems:    A comprehensive review of systems was negative except for that written in the History of Present Illness.     Objective:     Visit Vitals    /42    Pulse 75    Temp 98.4 °F (36.9 °C)    Resp 20    Ht 5' 3\" (1.6 m)    Wt 78.9 kg (174 lb)    LMP  (LMP Unknown)    SpO2 99%    Breastfeeding No    BMI 30.82 kg/m2        Physical Exam: nad  Chest clear  Heart reg  abd soft    Imaging:  images and reports reviewed    Lab Review:  No results found for this or any previous visit (from the past 24 hour(s)). Labs and radiology: images and reports reviewed      Assessment:     Hx shirlene    Plan:     1. I recommend proceeding with colonoscopy. Treatment alternatives were discussed. 2. Discussed aspects of surgical intervention, methods, risks (including by not limited to infection, bleeding, hematoma, and perforation of the intestines or solid organs), and the risks of general anesthetic. The patient understands the risks; any and all questions were answered to the patient's satisfaction.     Signed By: Anton Lamas MD     June 1, 2018

## 2018-06-18 RX ORDER — PILOCARPINE HYDROCHLORIDE 5 MG/1
TABLET, FILM COATED ORAL
Qty: 120 TAB | Refills: 3 | Status: SHIPPED | OUTPATIENT
Start: 2018-06-18 | End: 2020-03-10

## 2018-07-26 ENCOUNTER — OFFICE VISIT (OUTPATIENT)
Dept: INTERNAL MEDICINE CLINIC | Age: 73
End: 2018-07-26

## 2018-07-26 VITALS
HEART RATE: 79 BPM | WEIGHT: 169.4 LBS | OXYGEN SATURATION: 95 % | BODY MASS INDEX: 30.02 KG/M2 | RESPIRATION RATE: 16 BRPM | HEIGHT: 63 IN | SYSTOLIC BLOOD PRESSURE: 122 MMHG | DIASTOLIC BLOOD PRESSURE: 70 MMHG | TEMPERATURE: 98.7 F

## 2018-07-26 DIAGNOSIS — E03.8 ADULT ONSET HYPOTHYROIDISM: ICD-10-CM

## 2018-07-26 DIAGNOSIS — Z13.39 SCREENING FOR ALCOHOLISM: ICD-10-CM

## 2018-07-26 DIAGNOSIS — Z13.1 SCREENING FOR DIABETES MELLITUS: ICD-10-CM

## 2018-07-26 DIAGNOSIS — M89.9 DISORDER OF BONE AND CARTILAGE: ICD-10-CM

## 2018-07-26 DIAGNOSIS — Z00.00 MEDICARE ANNUAL WELLNESS VISIT, SUBSEQUENT: Primary | ICD-10-CM

## 2018-07-26 DIAGNOSIS — M94.9 DISORDER OF BONE AND CARTILAGE: ICD-10-CM

## 2018-07-26 DIAGNOSIS — Z13.31 SCREENING FOR DEPRESSION: ICD-10-CM

## 2018-07-26 DIAGNOSIS — Z79.899 ON STATIN THERAPY: ICD-10-CM

## 2018-07-26 DIAGNOSIS — Z13.6 SCREENING FOR ISCHEMIC HEART DISEASE: ICD-10-CM

## 2018-07-26 DIAGNOSIS — E78.00 PURE HYPERCHOLESTEROLEMIA: ICD-10-CM

## 2018-07-26 NOTE — MR AVS SNAPSHOT
[address] [facility], [place], operated by [facility] 
 
 
 [address] [id number] [phone] Patient: Aileen Johnson MRN: [id number] [facility]:[birth year] Visit Information Date & Time Provider Department Dept. Phone Encounter #  
 7/26/2018 10:20 AM ANNMARIE Saenz MD [address] [place] [address] [phone] [id number] Follow-up Instructions Return in about 6 months (around 1/26/2019) for follow up. Your Appointments 7/30/2018  9:00 AM  
LAB with LAB ONLY  
DIANA DC [facility] ([facility]) Appt Note: Future fasting labs Dr. Fior [address] [phone] [address] [facility] [address] 8/17/2018  3:40 PM  
Follow Up with Levar Nunez MD  
[facility] Appt Note: follow up $ 0 CP jll 2/13/18  
 [address], 
[address] [place] [address], [address] [phone]  
[address] [facility], [address] [place] [address], [address] [address]  
  
    
 2/8/2019  9:50 AM  
Follow Up with ANNMARIE Saenz MD  
[place] [address] ([facility]) Appt Note: 6M fasting [address] [phone] [address] [facility] [address] Upcoming Health Maintenance Date Due Hepatitis C Screening 1945 DTaP/Tdap/Td series (1 - Tdap) 5/1/1966 FOBT Q 1 YEAR AGE 50-75 5/1/1995 ZOSTER VACCINE AGE 60> 3/1/2005 GLAUCOMA SCREENING Q2Y 5/1/2010 Bone Densitometry (Dexa) Screening 5/1/2010 MEDICARE YEARLY EXAM 3/14/2018 Influenza Age 5 to Adult 8/1/2018 BREAST CANCER SCRN MAMMOGRAM 3/9/2020 Allergies as of 7/26/2018  Review Complete On: 7/26/2018 By: Ralf Connelly MD  
  
 Severity Noted Reaction Type Reactions Latex  05/31/2018    Rash Bandages causes a rash if left on for more than a couple of days. She buys latex free band-aids. Codeine Medium 04/07/2011   Side Effect Nausea and Vomiting Current Immunizations  Reviewed on 11/29/2017 Name Date Influenza Vaccine Split 10/26/2011 Pneumococcal Conjugate (PCV-13) 11/12/2015 ZZZ-RETIRED (DO NOT USE) Pneumococcal Vaccine (Unspecified Type) 11/9/2010 Not reviewed this visit You Were Diagnosed With   
  
 Codes Comments Medicare annual wellness visit, subsequent    -  Primary ICD-10-CM: Z00.00 ICD-9-CM: V70.0 Adult onset hypothyroidism     ICD-10-CM: E03.8 ICD-9-CM: 244.8 Pure hypercholesterolemia     ICD-10-CM: E78.00 ICD-9-CM: 272.0 On statin therapy     ICD-10-CM: Z79.899 ICD-9-CM: V58.69 Screening for alcoholism     ICD-10-CM: Z13.89 ICD-9-CM: V79.1 Screening for depression     ICD-10-CM: Z13.89 ICD-9-CM: V79.0 Screening for diabetes mellitus     ICD-10-CM: Z13.1 ICD-9-CM: V77.1 Screening for ischemic heart disease     ICD-10-CM: Z13.6 ICD-9-CM: V81.0 Disorder of bone and cartilage     ICD-10-CM: M89.9, M94.9 ICD-9-CM: 733.90 Vitals BP Pulse Temp Resp Height(growth percentile) Weight(growth percentile) 122/70 (BP 1 Location: Left arm, BP Patient Position: Sitting) 79 98.7 °F (37.1 °C) (Oral) 16 5' 3\" (1.6 m) 169 lb 6.4 oz (76.8 kg) LMP SpO2 BMI OB Status Smoking Status (LMP Unknown) 95% 30.01 kg/m2 Hysterectomy Never Smoker Vitals History BMI and BSA Data Body Mass Index Body Surface Area 30.01 kg/m 2 1.85 m 2 Preferred Pharmacy Pharmacy Name Phone [phone] [facility] [phone] Your Updated Medication List  
  
   
This list is accurate as of 7/26/18 11:14 AM.  Always use your most recent med list.  
  
  
  
  
 amitriptyline 25 mg tablet Commonly known as:  ELAVIL  
 Take 5 tablets at night for a month then 4 tablets at night for a month then 3 tablets at night for a month and then 2 tablets at night  Indications: NEUROPATHIC PAIN  
  
 levothyroxine 125 mcg tablet Commonly known as:  SYNTHROID  
TAKE 1 (ONE) TABLET, ORAL, DAILY FOR THYROID  
  
 pilocarpine 5 mg tablet Commonly known as:  SALAGEN  
TAKE 1 TABLET BY MOUTH 4 TIMES A DAY PriLOSEC OTC 20 mg tablet Generic drug:  omeprazole Take 20 mg by mouth daily. simvastatin 20 mg tablet Commonly known as:  ZOCOR  
TAKE 1 TABLET EVERY DAY We Performed the Following [address] [[id number] [facility]] [place] ANNUAL ALCOHOL SCREEN 15 MIN [id number] [facility]] Follow-up Instructions Return in about 6 months (around 1/26/2019) for follow up. To-Do List   
 07/27/2018 Lab:  CK   
  
 07/27/2018 Lab:  LIPID PANEL   
  
 07/27/2018 Lab:  METABOLIC PANEL, COMPREHENSIVE   
  
 07/27/2018 Lab:  T4, FREE   
  
 07/27/2018 Lab:  TSH 3RD GENERATION   
  
 07/29/2018 Imaging:  DEXA BONE DENSITY STUDY AXIAL Patient Instructions Medicare Wellness Visit, Female The best way to live healthy is to have a lifestyle where you eat a well-balanced diet, exercise regularly, limit alcohol use, and quit all forms of tobacco/nicotine, if applicable. Regular preventive services are another way to keep healthy. Preventive services (vaccines, screening tests, monitoring & exams) can help personalize your care plan, which helps you manage your own care. Screening tests can find health problems at the earliest stages, when they are easiest to treat. [facility] follows the current, evidence-based guidelines published by the [facility] Ahmet Parry (USPSTF) when recommending preventive services for our patients.  Because we follow these guidelines, sometimes recommendations change over time as research supports it. (For example, mammograms used to be recommended annually. Even though Medicare will still pay for an annual mammogram, the newer guidelines recommend a mammogram every two years for women of average risk.) Of course, you and your provider may decide to screen more often for some diseases, based on your risk and co-morbidities (chronic disease you are already diagnosed with). Preventive services for you include: - Medicare offers their members a free annual wellness visit, which is time for you and your primary care provider to discuss and plan for your preventive service needs. Take advantage of this benefit every year! 
 
-All people over age 72 should receive the recommended pneumonia vaccines. Current USPSTF guidelines recommend a series of two vaccines for the best pneumonia protection.  
 
-All adults should have a yearly flu vaccine and a tetanus vaccine every 10 years. All adults age 61 years should receive a shingles vaccine once in their lifetime.   
 
-A bone mass density test is recommended when a woman turns 65 to screen for osteoporosis. This test is only recommended once as a screening. Some providers will use this same test as a disease monitoring tool if you already have osteoporosis. -All adults age 38-68 years who are overweight should have a diabetes screening test once every three years.  
 
-Other screening tests & preventive services for persons with diabetes include: an eye exam to screen for diabetic retinopathy, a kidney function test, a foot exam, and stricter control over your cholesterol.  
 
-Cardiovascular screening for adults with routine risk involves an electrocardiogram (ECG) at intervals determined by the provider.  
 
-Colorectal cancer screenings should be done for adults age 54-65 years with normal risk. There are a number of acceptable methods of screening for this type of cancer. Each test has its own benefits and drawbacks. Discuss with your provider what is most appropriate for you during your annual wellness visit. The different tests include: colonoscopy (considered the best screening method), a fecal occult blood test, a fecal DNA test, and sigmoidoscopy. -Breast cancer screenings are recommended every other year for women of normal risk age 54-69 years.  
 
-Cervical cancer screenings for women over age 72 are only recommended with certain risk factors.  
 
-All adults born between [facility] should be screened once for Hepatitis C. Here is a list of your current Health Maintenance items (your personalized list of preventive services) with a due date: 
Health Maintenance Due Topic Date Due  
 Hepatitis C Test  1945  
 DTaP/Tdap/Td  (1 - Tdap) 05/01/1966  Stool testing for trace blood  05/01/1995  Shingles Vaccine  03/01/2005  Glaucoma Screening   05/01/2010  Bone Mineral Density   05/01/2010 Swetha Annual Well Visit  03/14/2018 All Medicare beneficiaries aged 48 and older are covered; however, when a beneficiary is at high risk, there is no minimum age required to receive a screening colonoscopy or a barium enema rendered as an alternative to a screening colonoscopy. The following are the coverage criteria for each colorectal cancer screening test/procedure. Screening FOBT Medicare provides coverage of a screening FOBT annually (i.e., at least 11 months have passed following the month in which the last covered screening FOBT was performed) for beneficiaries aged 48 and older. This screening requires a written order from the beneficiarys attending physician. NOTE: Medicare will only provide coverage for one FOBT per year: either HCPCS code  or CPT code 36629, but not both. Screening Colonoscopy For Beneficiaries at [address] [place] [address] for Developing Colorectal Cancer Medicare provides coverage of a screening colonoscopy (HCPCS code ) once every 2 years for beneficiaries at high risk for developing colorectal cancer (i.e., at least 23 months have passed following the month in which the last covered screening colonoscopy [HCPCS code ] was performed). For Beneficiaries Not at [address] for Developing Colorectal Cancer Medicare provides coverage of a screening colonoscopy (HCPCS code ) for beneficiaries who do not meet the criteria for being at high risk for developing colorectal cancer once every 10 years (i.e., at least 119 months have passed following the month in which the last covered screening colonoscopy [HCPCS code ] was performed). If the beneficiary otherwise qualifies to have a covered screening colonoscopy (HCPCS code ) based on the above but has had a covered screening flexible sigmoidoscopy (HCPCS code ), then Medicare may cover a screening colonoscopy (HCPCS code ) only after at least 47 months have passed following the month in which the last covered screening flexible sigmoidoscopy (HCPCS code ) was performed. Introducing [facility]! Graham Carey introduces [employer] patient portal. Now you can access parts of your medical record, email your doctor's office, and request medication refills online. 1. In your internet browser, go to [email or web address] 2. Click on the First Time User? Click Here link in the Sign In box. You will see the New Member Sign Up page. 3. Enter your [employer] Access Code exactly as it appears below. You will not need to use this code after youve completed the sign-up process. If you do not sign up before the expiration date, you must request a new code. · [employer] Access Code: [email or web address] Expires: 10/24/2018 11:07 AM 
 
4. Enter the last four digits of your Social Security Number (xxxx) and Date of Birth (mm/dd/yyyy) as indicated and click Submit. You will be taken to the next sign-up page. 5. Create a [employer] ID. This will be your [employer] login ID and cannot be changed, so think of one that is secure and easy to remember. 6. Create a [employer] password. You can change your password at any time. 7. Enter your Password Reset Question and Answer. This can be used at a later time if you forget your password. 8. Enter your e-mail address. You will receive e-mail notification when new information is available in [address] 9. Click Sign Up. You can now view and download portions of your medical record. 10. Click the Download Summary menu link to download a portable copy of your medical information. If you have questions, please visit the Frequently Asked Questions section of the [employer] website. Remember, [employer] is NOT to be used for urgent needs. For medical emergencies, dial 911. Now available from your iPhone and Android! Please provide this summary of care documentation to your next provider. Your primary care clinician is listed as ANNMARIE Pacheco. If you have any questions after today's visit, please call [phone].

## 2018-07-26 NOTE — PATIENT INSTRUCTIONS
Medicare Wellness Visit, Female    The best way to live healthy is to have a lifestyle where you eat a well-balanced diet, exercise regularly, limit alcohol use, and quit all forms of tobacco/nicotine, if applicable. Regular preventive services are another way to keep healthy. Preventive services (vaccines, screening tests, monitoring & exams) can help personalize your care plan, which helps you manage your own care. Screening tests can find health problems at the earliest stages, when they are easiest to treat. [address] [facility] follows the current, evidence-based guidelines published by the [facility] Ahmet Brinda ([facility]) when recommending preventive services for our patients. Because we follow these guidelines, sometimes recommendations change over time as research supports it. (For example, mammograms used to be recommended annually. Even though Medicare will still pay for an annual mammogram, the newer guidelines recommend a mammogram every two years for women of average risk.)    Of course, you and your provider may decide to screen more often for some diseases, based on your risk and co-morbidities (chronic disease you are already diagnosed with). Preventive services for you include:    - Medicare offers their members a free annual wellness visit, which is time for you and your primary care provider to discuss and plan for your preventive service needs. Take advantage of this benefit every year!    -All people over age 72 should receive the recommended pneumonia vaccines. Current USPSTF guidelines recommend a series of two vaccines for the best pneumonia protection.     -All adults should have a yearly flu vaccine and a tetanus vaccine every 10 years. All adults age 61 years should receive a shingles vaccine once in their lifetime.      -A bone mass density test is recommended when a woman turns 65 to screen for osteoporosis.  This test is only recommended once as a screening. Some providers will use this same test as a disease monitoring tool if you already have osteoporosis. -All adults age 38-68 years who are overweight should have a diabetes screening test once every three years.     -Other screening tests & preventive services for persons with diabetes include: an eye exam to screen for diabetic retinopathy, a kidney function test, a foot exam, and stricter control over your cholesterol.     -Cardiovascular screening for adults with routine risk involves an electrocardiogram (ECG) at intervals determined by the provider.     -Colorectal cancer screenings should be done for adults age 54-65 years with normal risk. There are a number of acceptable methods of screening for this type of cancer. Each test has its own benefits and drawbacks. Discuss with your provider what is most appropriate for you during your annual wellness visit. The different tests include: colonoscopy (considered the best screening method), a fecal occult blood test, a fecal DNA test, and sigmoidoscopy. -Breast cancer screenings are recommended every other year for women of normal risk age 54-69 years.     -Cervical cancer screenings for women over age 72 are only recommended with certain risk factors.     -All adults born between [facility] should be screened once for Hepatitis C.      Here is a list of your current Health Maintenance items (your personalized list of preventive services) with a due date:  Health Maintenance Due   Topic Date Due    Hepatitis C Test  1945    DTaP/Tdap/Td  (1 - Tdap) 05/01/1966    Stool testing for trace blood  05/01/1995    Shingles Vaccine  03/01/2005    Glaucoma Screening   05/01/2010    Bone Mineral Density   05/01/2010    Annual Well Visit  03/14/2018     All Medicare beneficiaries aged 48 and older are covered; however, when a beneficiary is at high risk, there is no minimum age required to receive a screening colonoscopy or a barium enema rendered as an alternative to a screening colonoscopy. The following are the coverage criteria for each colorectal cancer screening test/procedure. Screening FOBT  Medicare provides coverage of a screening FOBT annually (i.e., at least 11 months have passed following the month in which the last covered screening FOBT was performed) for beneficiaries aged 48 and older. This screening requires a written order from the beneficiarys attending physician. NOTE: Medicare will only provide coverage for one FOBT per year: either HCPCS code  or CPT code 00945, but not both. Screening Colonoscopy  For Beneficiaries at [address] [facility] for Developing Colorectal Cancer  Medicare provides coverage of a screening colonoscopy (HCPCS code ) once every 2 years for beneficiaries at high risk for developing colorectal cancer (i.e., at least 23 months have passed following the month in which the last covered screening colonoscopy [HCPCS code ] was performed). For Beneficiaries Not at [facility] for Developing Colorectal Cancer  Medicare provides coverage of a screening colonoscopy (HCPCS code ) for beneficiaries who do not meet the criteria for being at high risk for developing colorectal cancer once every 10 years (i.e., at least 119 months have passed following the month in which the last covered screening colonoscopy [HCPCS code ] was performed). If the beneficiary otherwise qualifies to have a covered screening colonoscopy (HCPCS code ) based on the above but has had a covered screening flexible sigmoidoscopy (HCPCS code ), then Medicare may cover a screening colonoscopy (HCPCS code ) only after at least 47 months have passed following the month in which the last covered screening flexible sigmoidoscopy (HCPCS code ) was performed.

## 2018-07-26 NOTE — PROGRESS NOTES
This is the Subsequent Medicare Annual Wellness Exam, performed 12 months or more after the Initial AWV or the last Subsequent AWV    I have reviewed the patient's medical history in detail and updated the computerized patient record. History     Past Medical History:   Diagnosis Date    Adult onset hypothyroidism 10/19/2017    Arthritis     feet and hands    Back pain with radiation 10/19/2017    Breast cyst 6 months ago     right breast     Disorder of bone and cartilage 10/19/2017    GERD (gastroesophageal reflux disease)     High risk medication use 10/19/2017    Hip pain, acute, left 10/19/2017    hypercholesteremia     elevatd cholesterol    Hyperlipidemia 10/19/2017    Hypothyroid     hypothyroid    Hypothyroid 10/19/2017    Irritable bowel syndrome with constipation 10/19/2017    Knee pain 10/19/2017    Menopausal disorder 10/19/2017    Migraine 10/19/2017    Migraines     Neurological disorder     migraines    Sjoegren syndrome ([address]     dry eyes    Sjogren's syndrome ([address] 10/19/2017    pt states she was tested for this and it was not sjogrens    Urinary frequency 10/19/2017      Past Surgical History:   Procedure Laterality Date    ABDOMEN SURGERY PROC UNLISTED  2011    removed \"sludge\" from duct in gallbladder    COLONOSCOPY Left 6/1/2018    COLON[facility] performed [facility] Jayden Or[facility]ga [facility] at [facility] ENDOSCOPY    HX BREAST BIOPSY Left 11 years ago     negative    HX GYN      hysterectomy    HX ORTHOPAEDIC  11/9/11    SPINE LUMBAR LAMINECTOMY - L4-5 LAMINECTOMY FOR REMOVAL OF EXTRA DURAL LESION ON THE LEFT - benign    HX OTHER SURGICAL      ganglion cyst removed finger    HX TONSILLECTOMY       Current Outpatient Prescriptions   Medication Sig Dispense Refill    pilocarpine (SALAGEN) 5 mg tablet TAKE 1 TABLET BY MOUTH 4 TIMES A DAY (Patient taking differently: TAKE ONE TABLET DAILY) 120 Tab 3    omeprazole (PRILOSEC OTC) 20 mg tablet Take 20 mg by mouth daily.       amitriptyline (ELAVIL) 25 mg tablet Take 5 tablets at night for a month then 4 tablets at night for a month then 3 tablets at night for a month and then 2 tablets at night  Indications: NEUROPATHIC PAIN (Patient taking differently: 50 mg nightly. Take 5 tablets at night for a month then 4 tablets at night for a month then 3 tablets at night for a month and then 2 tablets at night  Indications: NEUROPATHIC PAIN) 150 Tab 5    simvastatin (ZOCOR) 20 mg tablet TAKE 1 TABLET EVERY DAY 90 Tab 3    levothyroxine (SYNTHROID) 125 mcg tablet TAKE 1 (ONE) TABLET, ORAL, DAILY FOR THYROID 90 Tab 3     Allergies   Allergen Reactions    Latex Rash     Bandages causes a rash if left on for more than a couple of days. She buys latex free band-aids.  Codeine Nausea and Vomiting     Family History   Problem Relation Age of Onset    Heart Disease Mother      \"shrinking heart valve\"     Social History   Substance Use Topics    Smoking status: Never Smoker    Smokeless tobacco: Never Used    Alcohol use Yes      Comment: rarely     Patient Active Problem List   Diagnosis Code    Leukocytosis D72.829    Gait difficulty R26.9    Menopausal disorder N95.9    Irritable bowel syndrome with constipation K58.1    High risk medication use Z79.899    Urinary frequency R35.0    Sjogren's syndrome (HCC) M35.00    Adult onset hypothyroidism E03.8    Disorder of bone and cartilage M89.9, M94.9    Back pain with radiation M54.9    Hip pain, acute, left M25.552    Migraine G43.909    Knee pain M25.569    Hypothyroid E03.9    Pure hypercholesterolemia E78.00       Depression Risk Factor Screening:     PHQ over the last two weeks 7/26/2018   Little interest or pleasure in doing things Not at all   Feeling down, depressed, irritable, or hopeless Not at all   Total Score PHQ 2 0     Alcohol Risk Factor Screening: You do not drink alcohol or very rarely.     Functional Ability and Level of Safety:   Hearing Loss  Hearing is good.    Activities of Daily Living  The home contains: no safety equipment. Patient does total self care    Fall Risk  Fall Risk Assessment, last 12 mths 7/26/2018   Able to walk? Yes   Fall in past 12 months? No   Fall with injury? -   Number of falls in past 12 months -   Fall Risk Score -       Abuse Screen  Patient is not abused    Cognitive Screening   Evaluation of Cognitive Function:  Has your family/caregiver stated any concerns about your memory: no  Normal    Patient Care Team   Patient Care Team:  Leonardo Tobias MD as PCP - General (Internal Medicine)    Assessment/Plan   Education and counseling provided:  Are appropriate based on today's review and evaluation    Diagnoses and all orders for this visit:    1. Adult onset hypothyroidism  -     TSH 3RD GENERATION; Future  -     T4, FREE; Future    2. Pure hypercholesterolemia  -     LIPID PANEL; Future    3. On statin therapy  -     CK; Future  -     METABOLIC PANEL, COMPREHENSIVE; Future    4. Medicare annual wellness visit, subsequent    5. Screening for alcoholism  -     Annual  Alcohol Screen 15 min ()    6. Screening for depression  -     Depression Screen Annual    7. Screening for diabetes mellitus    8. Screening for ischemic heart disease    9. Disorder of bone and cartilage  -     Dexa Bone Density Study Axial ([id number]); Future        Health Maintenance Due   Topic Date Due    Hepatitis C Screening  1945    DTaP/Tdap/Td series (1 - Tdap) 05/01/1966    FOBT Q 1 YEAR AGE 50-75  05/01/1995    ZOSTER VACCINE AGE 60>  03/01/2005    GLAUCOMA SCREENING Q2Y  05/01/2010    Bone Densitometry (Dexa) Screening  05/01/2010    MEDICARE YEARLY EXAM  03/14/2018     This note will not be viewable in [employer]. Sakshi Hernandez is a 68 y.o. female and presents with Hypertension (room 1); Cholesterol Problem; and Hypothyroidism  . Subjective:    Mrs. Antonio Morley presents today for follow-up wellness visit and review of hypertension, hyperlipidemia, and hypothyroidism. She is doing well on her current medical regimen. She has a history of reflux but only takes a proton pump inhibitor on an as-needed basis. She has done physical therapy and is going to the gym to work out regularly now. Her  unfortunately passed away a couple of months ago. She denies symptoms of chest pain, palpitations, PND, orthopnea, or pedal edema. Her migraines have been diminished and she is try to wean her amitriptyline down but is currently taking 50 mg daily. Review of Systems  Constitutional: negative for fevers, chills, anorexia and weight loss  Eyes:   negative for visual disturbance and irritation  ENT:   negative for tinnitus,sore throat,nasal congestion,ear pains. hoarseness  Respiratory:  negative for cough, hemoptysis, dyspnea,wheezing  CV:   negative for chest pain, palpitations, lower extremity edema  GI:   negative for nausea, vomiting, diarrhea, abdominal pain,melena  Endo:               negative for polyuria,polydipsia,polyphagia,heat intolerance  Genitourinary: negative for frequency, dysuria and hematuria  Integumentary: negative for rash and pruritus  Hematologic:  negative for easy bruising and gum/nose bleeding  Musculoskel: negative for myalgias, arthralgias, back pain, muscle weakness, joint pain  Neurological:  negative for headaches, dizziness, vertigo, memory problems and gait   Behavl/Psych: negative for feelings of anxiety, depression, mood changes    Past Medical History:   Diagnosis Date    Adult onset hypothyroidism 10/19/2017    Arthritis     feet and hands    Back pain with radiation 10/19/2017    Breast cyst 6 months ago     right breast     Disorder of bone and cartilage 10/19/2017    GERD (gastroesophageal reflux disease)     High risk medication use 10/19/2017    Hip pain, acute, left 10/19/2017    hypercholesteremia     elevatd cholesterol    Hyperlipidemia 10/19/2017    Hypothyroid     hypothyroid    Hypothyroid 10/19/2017    Irritable bowel syndrome with constipation 10/19/2017    Knee pain 10/19/2017    Menopausal disorder 10/19/2017    Migraine 10/19/2017    Migraines     Neurological disorder     migraines    Sjoegren syndrome ([address]     dry eyes    Sjogren's syndrome ([address] 10/19/2017    pt states she was tested for this and it was not sjogrens    Urinary frequency 10/19/2017     Past Surgical History:   Procedure Laterality Date    ABDOMEN SURGERY PROC UNLISTED  2011    removed \"sludge\" from duct in gallbladder    COLONOSCOPY Left 6/1/2018    COLONOSCOPY performed by Shirley Atkinson MD at [address] HX BREAST BIOPSY Left 11 years ago     negative    HX GYN      hysterectomy    HX ORTHOPAEDIC  11/9/11    SPINE LUMBAR LAMINECTOMY - L4-5 LAMINECTOMY FOR REMOVAL OF EXTRA DURAL LESION ON THE LEFT - benign    HX OTHER SURGICAL      ganglion cyst removed finger    HX TONSILLECTOMY       Social History     Social History    Marital status:      Spouse name: N/A    Number of children: N/A    Years of education: N/A     Social History Main Topics    Smoking status: Never Smoker    Smokeless tobacco: Never Used    Alcohol use Yes      Comment: rarely    Drug use: No    Sexual activity: Yes     Partners: Male     Birth control/ protection: Surgical     Other Topics Concern    None     Social History Narrative     Family History   Problem Relation Age of Onset    Heart Disease Mother      \"shrinking heart valve\"     Current Outpatient Prescriptions   Medication Sig Dispense Refill    pilocarpine (SALAGEN) 5 mg tablet TAKE 1 TABLET BY MOUTH 4 TIMES A DAY (Patient taking differently: TAKE ONE TABLET DAILY) 120 Tab 3    omeprazole (PRILOSEC OTC) 20 mg tablet Take 20 mg by mouth daily.       amitriptyline (ELAVIL) 25 mg tablet Take 5 tablets at night for a month then 4 tablets at night for a month then 3 tablets at night for a month and then 2 tablets at night  Indications: NEUROPATHIC PAIN (Patient taking differently: 50 mg nightly. Take 5 tablets at night for a month then 4 tablets at night for a month then 3 tablets at night for a month and then 2 tablets at night  Indications: NEUROPATHIC PAIN) 150 Tab 5    simvastatin (ZOCOR) 20 mg tablet TAKE 1 TABLET EVERY DAY 90 Tab 3    levothyroxine (SYNTHROID) 125 mcg tablet TAKE 1 (ONE) TABLET, ORAL, DAILY FOR THYROID 90 Tab 3     Allergies   Allergen Reactions    Latex Rash     Bandages causes a rash if left on for more than a couple of days. She buys latex free band-aids.  Codeine Nausea and Vomiting       Objective:  Visit Vitals    /70 (BP 1 Location: Left arm, BP Patient Position: Sitting)    Pulse 79    Temp 98.7 °F (37.1 °C) (Oral)    Resp 16    Ht 5' 3\" (1.6 m)    Wt 169 lb 6.4 oz (76.8 kg)    LMP  (LMP Unknown)    SpO2 95%    BMI 30.01 kg/m2     Physical Exam:   General appearance - alert, well appearing, and in no distress  Mental status - alert, oriented to person, place, and time  EYE-NIEVES, EOMI, fundi normal, corneas normal, no foreign bodies  ENT-ENT exam normal, no neck nodes or sinus tenderness  Nose - normal and patent, no erythema, discharge or polyps  Mouth - mucous membranes moist, pharynx normal without lesions  Neck - supple, no significant adenopathy   Chest - clear to auscultation, no wheezes, rales or rhonchi, symmetric air entry   Heart - normal rate, regular rhythm, normal S1, S2, no murmurs, rubs, clicks or gallops   Abdomen - soft, nontender, nondistended, no masses or organomegaly  Lymph- no adenopathy palpable  Ext-peripheral pulses normal, no pedal edema, no clubbing or cyanosis  Skin-Warm and dry. no hyperpigmentation, vitiligo, or suspicious lesions  Neuro -alert, oriented, normal speech, no focal findings or movement disorder noted  Musculoskeletal- FROM, no bony abnormalities, no point tenderness  Breast -deferred  Pelvic deferred    No results found for this visit on 07/26/18.   All results for lab orders may not have been returned by the time this encountered was closed. Assessment/Plan:    Orders Placed This Encounter    Depression Screen Annual    Dexa Bone Density Study Axial ([id number])     Standing Status:   Future     Standing Expiration Date:   1/22/2019     Order Specific Question:   Reason for Exam     Answer:   Screening    LIPID PANEL     Standing Status:   Future     Standing Expiration Date:   8/23/2018    CK     Standing Status:   Future     Standing Expiration Date:   6/71/2513    METABOLIC PANEL, COMPREHENSIVE     Standing Status:   Future     Standing Expiration Date:   8/23/2018    TSH 3RD GENERATION     Standing Status:   Future     Standing Expiration Date:   8/23/2018    T4, FREE     Standing Status:   Future     Standing Expiration Date:   8/23/2018    Annual  Alcohol Screen 15 min ()       Problem List Items Addressed This Visit     Adult onset hypothyroidism    Relevant Orders    TSH 3RD GENERATION    T4, FREE    Disorder of bone and cartilage    Relevant Orders    DEXA BONE DENSITY STUDY AXIAL    Pure hypercholesterolemia    Relevant Orders    LIPID PANEL      Other Visit Diagnoses     Medicare annual wellness visit, subsequent    -  Primary    On statin therapy        Relevant Orders    CK    METABOLIC PANEL, COMPREHENSIVE    Screening for alcoholism        Relevant Orders    ND ANNUAL ALCOHOL SCREEN 15 MIN    Screening for depression        Relevant Orders    DEPRESSION SCREEN ANNUAL    Screening for diabetes mellitus        Screening for ischemic heart disease          Plan:    Hypercholesterolemia uncertain status pending lipid panel. Continue simvastatin. Hypothyroidism status uncertain pending thyroid panel. Continue amitriptyline for migraine prophylaxis. Patient has weaned and has follow-up with neurology scheduled.         Patient Instructions       Medicare Wellness Visit, Female    The best way to live healthy is to have a lifestyle where you eat a well-balanced diet, exercise regularly, limit alcohol use, and quit all forms of tobacco/nicotine, if applicable. Regular preventive services are another way to keep healthy. Preventive services (vaccines, screening tests, monitoring & exams) can help personalize your care plan, which helps you manage your own care. Screening tests can find health problems at the earliest stages, when they are easiest to treat. [facility] follows the current, evidence-based guidelines published by the [facility] Parry ([facility]) when recommending preventive services for our patients. Because we follow these guidelines, sometimes recommendations change over time as research supports it. (For example, mammograms used to be recommended annually. Even though Medicare will still pay for an annual mammogram, the newer guidelines recommend a mammogram every two years for women of average risk.)    Of course, you and your provider may decide to screen more often for some diseases, based on your risk and co-morbidities (chronic disease you are already diagnosed with). Preventive services for you include:    - Medicare offers their members a free annual wellness visit, which is time for you and your primary care provider to discuss and plan for your preventive service needs. Take advantage of this benefit every year!    -All people over age 72 should receive the recommended pneumonia vaccines. Current USPSTF guidelines recommend a series of two vaccines for the best pneumonia protection.     -All adults should have a yearly flu vaccine and a tetanus vaccine every 10 years. All adults age 61 years should receive a shingles vaccine once in their lifetime.      -A bone mass density test is recommended when a woman turns 65 to screen for osteoporosis. This test is only recommended once as a screening.  Some providers will use this same test as a disease monitoring tool if you already have osteoporosis. -All adults age 38-68 years who are overweight should have a diabetes screening test once every three years.     -Other screening tests & preventive services for persons with diabetes include: an eye exam to screen for diabetic retinopathy, a kidney function test, a foot exam, and stricter control over your cholesterol.     -Cardiovascular screening for adults with routine risk involves an electrocardiogram (ECG) at intervals determined by the provider.     -Colorectal cancer screenings should be done for adults age 54-65 years with normal risk. There are a number of acceptable methods of screening for this type of cancer. Each test has its own benefits and drawbacks. Discuss with your provider what is most appropriate for you during your annual wellness visit. The different tests include: colonoscopy (considered the best screening method), a fecal occult blood test, a fecal DNA test, and sigmoidoscopy. -Breast cancer screenings are recommended every other year for women of normal risk age 54-69 years.     -Cervical cancer screenings for women over age 72 are only recommended with certain risk factors.     -All adults born between [facility] should be screened once for Hepatitis C. Here is a list of your current Health Maintenance items (your personalized list of preventive services) with a due date:  Health Maintenance Due   Topic Date Due    Hepatitis C Test  1945    DTaP/Tdap/Td  (1 - Tdap) 05/01/1966    Stool testing for trace blood  05/01/1995    Shingles Vaccine  03/01/2005    Glaucoma Screening   05/01/2010    Bone Mineral Density   05/01/2010    Annual Well Visit  03/14/2018     All Medicare beneficiaries aged 48 and older are covered; however, when a beneficiary is at high risk, there is no minimum age required to receive a screening colonoscopy or a barium enema rendered as an alternative to a screening colonoscopy.     The following are the coverage criteria for each colorectal cancer screening test/procedure. Screening FOBT  Medicare provides coverage of a screening FOBT annually (i.e., at least 11 months have passed following the month in which the last covered screening FOBT was performed) for beneficiaries aged 48 and older. This screening requires a written order from the beneficiarys attending physician. NOTE: Medicare will only provide coverage for one FOBT per year: either HCPCS code  or CPT code 12977, but not both. Screening Colonoscopy  For Beneficiaries at [facility] for Developing Colorectal Cancer  Medicare provides coverage of a screening colonoscopy (HCPCS code ) once every 2 years for beneficiaries at high risk for developing colorectal cancer (i.e., at least 23 months have passed following the month in which the last covered screening colonoscopy [HCPCS code ] was performed). For Beneficiaries Not at [facility] for Developing Colorectal Cancer  Medicare provides coverage of a screening colonoscopy (HCPCS code ) for beneficiaries who do not meet the criteria for being at high risk for developing colorectal cancer once every 10 years (i.e., at least 119 months have passed following the month in which the last covered screening colonoscopy [HCPCS code ] was performed). If the beneficiary otherwise qualifies to have a covered screening colonoscopy (HCPCS code ) based on the above but has had a covered screening flexible sigmoidoscopy (HCPCS code ), then Medicare may cover a screening colonoscopy (HCPCS code ) only after at least 47 months have passed following the month in which the last covered screening flexible sigmoidoscopy (HCPCS code ) was performed. Follow-up Disposition:  Return in about 6 months (around 1/26/2019) for follow up. I have reviewed with the patient details of the assessment and plan and all questions were answered. Relevent patient education was performed. The most recent lab findings were reviewed with the patient. An After Visit Summary was printed and given to the patient.       Darrius Singh MD

## 2018-07-26 NOTE — PROGRESS NOTES
Chief Complaint   Patient presents with    Hypertension     room 1    Cholesterol Problem    Hypothyroidism     1. Have you been to the ER, urgent care clinic since your last visit? Hospitalized since your last visit? NO  2. Have you seen or consulted any other health care providers outside of the [address] [place] [address] since your last visit? Include any pap smears or colon screening. YES, [facility]      PT IS HERE FOR ROUTINE F/U. PT IS NOT FASTING.

## 2018-07-30 ENCOUNTER — APPOINTMENT (OUTPATIENT)
Dept: INTERNAL MEDICINE CLINIC | Age: 73
End: 2018-07-30

## 2018-07-30 DIAGNOSIS — E03.8 ADULT ONSET HYPOTHYROIDISM: ICD-10-CM

## 2018-07-30 DIAGNOSIS — E78.00 PURE HYPERCHOLESTEROLEMIA: ICD-10-CM

## 2018-07-30 DIAGNOSIS — Z79.899 ON STATIN THERAPY: ICD-10-CM

## 2018-07-31 LAB
ALBUMIN SERPL-MCNC: 3.9 G/DL (ref 3.5–4.8)
ALBUMIN/GLOB SERPL: 1.7 {RATIO} (ref 1.2–2.2)
ALP SERPL-CCNC: 82 IU/L (ref 39–117)
ALT SERPL-CCNC: 16 IU/L (ref 0–32)
AST SERPL-CCNC: 22 IU/L (ref 0–40)
BILIRUB SERPL-MCNC: 0.2 MG/DL (ref 0–1.2)
BUN SERPL-MCNC: 14 MG/DL (ref 8–27)
BUN/CREAT SERPL: 16 (ref 12–28)
CALCIUM SERPL-MCNC: 9.2 MG/DL (ref 8.7–10.3)
CHLORIDE SERPL-SCNC: 102 MMOL/L (ref 96–106)
CHOLEST SERPL-MCNC: 161 MG/DL (ref 100–199)
CK SERPL-CCNC: 64 U/L (ref 24–173)
CO2 SERPL-SCNC: 28 MMOL/L (ref 20–29)
CREAT SERPL-MCNC: 0.89 MG/DL (ref 0.57–1)
GLOBULIN SER CALC-MCNC: 2.3 G/DL (ref 1.5–4.5)
GLUCOSE SERPL-MCNC: 99 MG/DL (ref 65–99)
HDLC SERPL-MCNC: 52 MG/DL
LDLC SERPL CALC-MCNC: 87 MG/DL (ref 0–99)
POTASSIUM SERPL-SCNC: 4.3 MMOL/L (ref 3.5–5.2)
PROT SERPL-MCNC: 6.2 G/DL (ref 6–8.5)
SODIUM SERPL-SCNC: 140 MMOL/L (ref 134–144)
T4 FREE SERPL-MCNC: 1.49 NG/DL (ref 0.82–1.77)
TRIGL SERPL-MCNC: 108 MG/DL (ref 0–149)
TSH SERPL DL<=0.005 MIU/L-ACNC: 1.73 UIU/ML (ref 0.45–4.5)
VLDLC SERPL CALC-MCNC: 22 MG/DL (ref 5–40)

## 2018-08-17 ENCOUNTER — OFFICE VISIT (OUTPATIENT)
Dept: NEUROLOGY | Age: 73
End: 2018-08-17

## 2018-08-17 VITALS
HEART RATE: 88 BPM | WEIGHT: 168 LBS | HEIGHT: 63 IN | BODY MASS INDEX: 29.77 KG/M2 | DIASTOLIC BLOOD PRESSURE: 80 MMHG | SYSTOLIC BLOOD PRESSURE: 122 MMHG | OXYGEN SATURATION: 98 %

## 2018-08-17 DIAGNOSIS — R26.89 BALANCE PROBLEM: ICD-10-CM

## 2018-08-17 DIAGNOSIS — G43.909 MIGRAINE WITHOUT STATUS MIGRAINOSUS, NOT INTRACTABLE, UNSPECIFIED MIGRAINE TYPE: Primary | ICD-10-CM

## 2018-08-17 NOTE — PATIENT INSTRUCTIONS
Office Policies  o Phone calls/patient messages:  Please allow up to 24 hours for someone in the office to contact you about your call or message. Be mindful your provider may be out of the office or your message may require further review. We encourage you to use [employer] for your messages as this is a faster, more efficient way to communicate with our office  o Medication Refills:  Prescription medications require up to 48 business hours to process. We encourage you to use [employer] for your refills. For controlled medications: Please allow up to 72 business hours to process. Certain medications may require you to  a written prescription at our office. NO narcotic/controlled medications will be prescribed after 4pm Monday through Friday or on weekends  o Form/Paperwork Completion:  Please note there is a $25 fee for all paperwork completed by our providers. We ask that you allow 7-14 business days. Pre-payment is due prior to picking up/faxing the completed form. You may also download your forms to [employer] to have your doctor print off.  o Test Results: In order for a patient to obtain test results, an appointment must be made with the physician to review the results. Test results cannot be discussed over the phone.

## 2018-08-17 NOTE — PROGRESS NOTES
HISTORY OF PRESENT ILLNESS  Jenny Lynn is a 68 y.o. female. HPI Comments: The patient is here today for follow-up with trouble with her migraines and trouble with her balance. She tapered herself down to amitriptyline 50 mg a day, she tried to go lower but could not sleep. She is now down to one third the dose she was on before. She actually says she feels better. Her  is passed away in the meantime, he was ill for a long period of time think that is taken some stress off of her. She describes something that happened to her when she is down on her hands and knees gardening, she will stand up and get a peculiar sensation that lasts 6280 seconds in her lower extremities but then passes. Does not involve the groin area. He wonders whether that is a concerning problem. She does not have a significant problem with headaches these days. Review of Systems   Gastrointestinal: Positive for constipation and heartburn. Neurological: Positive for headaches. All other systems reviewed and are negative. Current Outpatient Prescriptions on File Prior to Visit   Medication Sig Dispense Refill    omeprazole (PRILOSEC OTC) 20 mg tablet Take 20 mg by mouth daily.  amitriptyline (ELAVIL) 25 mg tablet Take 5 tablets at night for a month then 4 tablets at night for a month then 3 tablets at night for a month and then 2 tablets at night  Indications: NEUROPATHIC PAIN (Patient taking differently: 50 mg nightly.  Take 5 tablets at night for a month then 4 tablets at night for a month then 3 tablets at night for a month and then 2 tablets at night  Indications: NEUROPATHIC PAIN) 150 Tab 5    simvastatin (ZOCOR) 20 mg tablet TAKE 1 TABLET EVERY DAY 90 Tab 3    levothyroxine (SYNTHROID) 125 mcg tablet TAKE 1 (ONE) TABLET, ORAL, DAILY FOR THYROID 90 Tab 3    pilocarpine (SALAGEN) 5 mg tablet TAKE 1 TABLET BY MOUTH 4 TIMES A DAY (Patient taking differently: TAKE ONE TABLET DAILY) 120 Tab 3     No current facility-administered medications on file prior to visit. Past Medical History:   Diagnosis Date    Adult onset hypothyroidism 10/19/2017    Arthritis     feet and hands    Back pain with radiation 10/19/2017    Breast cyst 6 months ago     right breast     Disorder of bone and cartilage 10/19/2017    GERD (gastroesophageal reflux disease)     High risk medication use 10/19/2017    Hip pain, acute, left 10/19/2017    hypercholesteremia     elevatd cholesterol    Hyperlipidemia 10/19/2017    Hypothyroid     hypothyroid    Hypothyroid 10/19/2017    Irritable bowel syndrome with constipation 10/19/2017    Knee pain 10/19/2017    Menopausal disorder 10/19/2017    Migraine 10/19/2017    Migraines     Neurological disorder     migraines    Sjoegren syndrome ([facility] [address]     dry eyes    Sjogren's syndrome ([facility] [address] 10/19/2017    pt states she was tested for this and it was not sjogrens    Urinary frequency 10/19/2017     Family History   Problem Relation Age of Onset    Heart Disease Mother      \"shrinking heart valve\"     Social History     Social History    Marital status:      Spouse name: N/A    Number of children: N/A    Years of education: N/A     Social History Main Topics    Smoking status: Never Smoker    Smokeless tobacco: Never Used    Alcohol use Yes      Comment: rarely    Drug use: No    Sexual activity: Yes     Partners: Male     Birth control/ protection: Surgical     Other Topics Concern    None     Social History Narrative     /80  Pulse 88  Ht 5' 3\" (1.6 m)  Wt 168 lb (76.2 kg)  LMP  (LMP Unknown)  SpO2 98%  BMI 29.76 kg/m2      Physical Exam   Constitutional: She is oriented to person, place, and time. She appears well-developed and well-nourished. No distress. HENT:   Head: Normocephalic and atraumatic. Neurological: She is alert and oriented to person, place, and time.  She has normal strength and normal reflexes  Speech, language and mentation normal Skin: Skin is warm and dry. No rash noted. She is not diaphoretic. No erythema. Psychiatric: She has a normal mood and affect. Her behavior is normal. Judgment and thought content normal.       ASSESSMENT and PLAN  FALLS  Her balance is considerably better. Her headaches are doing well. I suspect her  passing took some pressure off of her. I see no reason alter her medications at 50 mg of amitriptyline at night at this time. I will see her back in 6 months. HEADACHE  She does not have migraines anymore. BALALACE ISSUES  Resolved    This note will not be viewable in MyChart.

## 2018-08-17 NOTE — MR AVS SNAPSHOT
[address], 
[id number], [address] [facility] 
[phone] Patient: Aure Palomino MRN: [id number] Visit Information Date & Time Provider Department Dept. Phone Encounter #  
 8/17/2018  3:40 PM Gomez Marte MD [facility] [phone] [id number] Your Appointments 2/8/2019  9:50 AM  
Follow Up with ANNMARIE Garcia MD  
[facility], [address] ([address]) Appt Note: 6M fasting [address] [phone] [address] [address]  
  
    
 2/22/2019  3:00 PM  
Follow Up with Gomez Marte MD  
[facility] [address]) Appt Note: follow up $ 0 CP jll 8/17/18  
 [address] [place] [address], 
[address] [place] [address], [address] [phone]  
[address], [address] [place] [address], [address] [phone] Upcoming Health Maintenance Date Due Hepatitis C Screening 1945 DTaP/Tdap/Td series (1 - Tdap) 5/1/1966 FOBT Q 1 YEAR AGE 50-75 5/1/1995 ZOSTER VACCINE AGE 60> 3/1/2005 GLAUCOMA SCREENING Q2Y 5/1/2010 Influenza Age 5 to Adult 8/1/2018 MEDICARE YEARLY EXAM 7/27/2019 BREAST CANCER SCRN MAMMOGRAM 3/9/2020 Allergies as of 8/17/2018  Review Complete On: 7/26/2018 By: Jeanie Favre, MD  
  
 Severity Noted Reaction Type Reactions Latex  05/31/2018    Rash Bandages causes a rash if left on for more than a couple of days. She buys latex free band-aids. Codeine Medium 04/07/2011   Side Effect Nausea and Vomiting Current Immunizations  Reviewed on 11/29/2017 Name Date Influenza Vaccine Split 10/26/2011 Pneumococcal Conjugate (PCV-13) 11/12/2015 ZZZ-RETIRED (DO NOT USE) Pneumococcal Vaccine (Unspecified Type) 11/9/2010 Not reviewed this visit Vitals BP Pulse Height(growth percentile) Weight(growth percentile) LMP SpO2  
 122/80 88 5' 3\" (1.6 m) 168 lb (76.2 kg) (LMP Unknown) 98% BMI OB Status Smoking Status 29.76 kg/m2 Hysterectomy Never Smoker Vitals History BMI and BSA Data Body Mass Index Body Surface Area  
 29.76 kg/m 2 1.84 m 2 Preferred Pharmacy Pharmacy Name Phone [id number] [id number] [facility] [phone] Your Updated Medication List  
  
   
This list is accurate as of 8/17/18  3:47 PM.  Always use your most recent med list.  
  
  
  
  
 amitriptyline 25 mg tablet Commonly known as:  ELAVIL Take 5 tablets at night for a month then 4 tablets at night for a month then 3 tablets at night for a month and then 2 tablets at night  Indications: NEUROPATHIC PAIN  
  
 levothyroxine 125 mcg tablet Commonly known as:  SYNTHROID  
TAKE 1 (ONE) TABLET, ORAL, DAILY FOR THYROID  
  
 pilocarpine 5 mg tablet Commonly known as:  SALAGEN  
TAKE 1 TABLET BY MOUTH 4 TIMES A DAY PriLOSEC OTC 20 mg tablet Generic drug:  omeprazole Take 20 mg by mouth daily. simvastatin 20 mg tablet Commonly known as:  ZOCOR  
TAKE 1 TABLET EVERY DAY Patient Instructions Office Policies 
o Phone calls/patient messages: Please allow up to 24 hours for someone in the office to contact you about your call or message. Be mindful your provider may be out of the office or your message may require further review. We encourage you to use [employer] for your messages as this is a faster, more efficient way to communicate with our office 
o Medication Refills: 
Prescription medications require up to 48 business hours to process. We encourage you to use [employer] for your refills.   
For controlled medications: Please allow up to 72 business hours to process. Certain medications may require you to  a written prescription at our office. NO narcotic/controlled medications will be prescribed after 4pm Monday through Friday or on weekends 
o Form/Paperwork Completion: 
Please note there is a $25 fee for all paperwork completed by our providers. We ask that you allow 7-14 business days. Pre-payment is due prior to picking up/faxing the completed form. You may also download your forms to [employer] to have your doctor print off. 
o Test Results: In order for a patient to obtain test results, an appointment must be made with the physician to review the results. Test results cannot be discussed over the phone. Introducing [facility]! Carli Cox introduces [employer] patient portal. Now you can access parts of your medical record, email your doctor's office, and request medication refills online. 1. In your internet browser, go to [email or web address] 2. Click on the First Time User? Click Here link in the Sign In box. You will see the New Member Sign Up page. 3. Enter your [employer] Access Code exactly as it appears below. You will not need to use this code after youve completed the sign-up process. If you do not sign up before the expiration date, you must request a new code. · [employer] Access Code: [email or web address] Expires: 10/24/2018 11:07 AM 
 
4. Enter the last four digits of your Social Security Number (xxxx) and Date of Birth (mm/dd/yyyy) as indicated and click Submit. You will be taken to the next sign-up page. 5. Create a [employer] ID. This will be your [employer] login ID and cannot be changed, so think of one that is secure and easy to remember. 6. Create a [employer] password. You can change your password at any time. 7. Enter your Password Reset Question and Answer. This can be used at a later time if you forget your password. 8. Enter your e-mail address.  You will receive e-mail notification when new information is available in [employer]. 9. Click Sign Up. You can now view and download portions of your medical record. 10. Click the Download Summary menu link to download a portable copy of your medical information. If you have questions, please visit the Frequently Asked Questions section of the [employer] website. Remember, [employer] is NOT to be used for urgent needs. For medical emergencies, dial 911. Now available from your iPhone and Android! Please provide this summary of care documentation to your next provider. Your primary care clinician is listed as ANNMARIE Castellano. If you have any questions after today's visit, please call [phone].

## 2018-12-03 RX ORDER — LEVOTHYROXINE SODIUM 125 UG/1
TABLET ORAL
Qty: 90 TAB | Refills: 3 | Status: SHIPPED | OUTPATIENT
Start: 2018-12-03 | End: 2020-01-24 | Stop reason: SDUPTHER

## 2019-01-17 RX ORDER — SIMVASTATIN 20 MG/1
TABLET, FILM COATED ORAL
Qty: 90 TAB | Refills: 3 | Status: SHIPPED | OUTPATIENT
Start: 2019-01-17 | End: 2019-10-30 | Stop reason: SDUPTHER

## 2019-01-17 RX ORDER — AMITRIPTYLINE HYDROCHLORIDE 25 MG/1
TABLET, FILM COATED ORAL
Qty: 60 TAB | Refills: 5 | Status: SHIPPED | OUTPATIENT
Start: 2019-01-17 | End: 2019-01-21 | Stop reason: SDUPTHER

## 2019-01-21 RX ORDER — AMITRIPTYLINE HYDROCHLORIDE 25 MG/1
50 TABLET, FILM COATED ORAL
Qty: 180 TAB | Refills: 3 | Status: SHIPPED | OUTPATIENT
Start: 2019-01-21 | End: 2020-01-24 | Stop reason: SDUPTHER

## 2019-02-22 ENCOUNTER — OFFICE VISIT (OUTPATIENT)
Dept: NEUROLOGY | Age: 74
End: 2019-02-22

## 2019-02-22 VITALS
WEIGHT: 172 LBS | OXYGEN SATURATION: 98 % | DIASTOLIC BLOOD PRESSURE: 58 MMHG | HEART RATE: 93 BPM | BODY MASS INDEX: 30.47 KG/M2 | SYSTOLIC BLOOD PRESSURE: 110 MMHG

## 2019-02-22 DIAGNOSIS — M54.41 CHRONIC MIDLINE LOW BACK PAIN WITH BILATERAL SCIATICA: Primary | ICD-10-CM

## 2019-02-22 DIAGNOSIS — M54.42 CHRONIC MIDLINE LOW BACK PAIN WITH BILATERAL SCIATICA: Primary | ICD-10-CM

## 2019-02-22 DIAGNOSIS — G89.29 CHRONIC MIDLINE LOW BACK PAIN WITH BILATERAL SCIATICA: Primary | ICD-10-CM

## 2019-02-22 NOTE — PROGRESS NOTES
HISTORY OF PRESENT ILLNESS Last Visit  Gloria Melvin is a 68 y.o. female.  : The patient is here today for follow-up with trouble with her migraines and trouble with her balance. She tapered herself down to amitriptyline 50 mg a day, she tried to go lower but could not sleep. She is now down to one third the dose she was on before. She actually says she feels better. Her  is passed away in the meantime, he was ill for a long period of time think that is taken some stress off of her. She describes something that happened to her when she is down on her hands and knees gardening, she will stand up and get a peculiar sensation that lasts 6 to 80 seconds in her lower extremities but then passes. Does not involve the groin area. She wonders whether that is a concerning problem. She does not have a significant problem with headaches these days. HPI Today  Ms. Sanjeev Kirk returns, she no longer has the unusual sensation when she gets up from kneeling down, she does have pain in her lower back when she is been sitting for a long period of time especially when her back is extended such as on a soft chair or couch. She denies any radiating pain when she is up moving around. She denies any bowel or bladder complaints. She denies any leg weakness. Review of Systems   Gastrointestinal: Positive for constipation and heartburn. Neurological: Positive for headaches. All other systems reviewed and are negative. Current Outpatient Medications on File Prior to Visit   Medication Sig Dispense Refill    amitriptyline (ELAVIL) 25 mg tablet Take 2 Tabs by mouth nightly.  180 Tab 3    simvastatin (ZOCOR) 20 mg tablet TAKE 1 TABLET EVERY DAY 90 Tab 3    levothyroxine (SYNTHROID) 125 mcg tablet TAKE 1 TABLET EVERY DAY FOR THYROID 90 Tab 3    amitriptyline (ELAVIL) 25 mg tablet Take 5 tablets at night for a month then 4 tablets at night for a month then 3 tablets at night for a month and then 2 tablets at night Indications: NEUROPATHIC PAIN (Patient taking differently: 50 mg nightly. Take 5 tablets at night for a month then 4 tablets at night for a month then 3 tablets at night for a month and then 2 tablets at night  Indications: NEUROPATHIC PAIN) 150 Tab 5    pilocarpine (SALAGEN) 5 mg tablet TAKE 1 TABLET BY MOUTH 4 TIMES A DAY (Patient taking differently: TAKE ONE TABLET DAILY) 120 Tab 3    omeprazole (PRILOSEC OTC) 20 mg tablet Take 20 mg by mouth daily. No current facility-administered medications on file prior to visit.       Past Medical History:   Diagnosis Date    Adult onset hypothyroidism 10/19/2017    Arthritis     feet and hands    Back pain with radiation 10/19/2017    Breast cyst 6 months ago     right breast     Disorder of bone and cartilage 10/19/2017    GERD (gastroesophageal reflux disease)     High risk medication use 10/19/2017    Hip pain, acute, left 10/19/2017    hypercholesteremia     elevatd cholesterol    Hyperlipidemia 10/19/2017    Hypothyroid     hypothyroid    Hypothyroid 10/19/2017    Irritable bowel syndrome with constipation 10/19/2017    Knee pain 10/19/2017    Menopausal disorder 10/19/2017    Migraine 10/19/2017    Migraines     Neurological disorder     migraines    Sjoegren syndrome ([facility] [address]     dry eyes    Sjogren's syndrome ([facility] [address] 10/19/2017    pt states she was tested for this and it was not sjogrens    Urinary frequency 10/19/2017     Family History   Problem Relation Age of Onset    Heart Disease Mother         \"shrinking heart valve\"     Social History     Socioeconomic History    Marital status:      Spouse name: Not on file    Number of children: Not on file    Years of education: Not on file    Highest education level: Not on file   Tobacco Use    Smoking status: Never Smoker    Smokeless tobacco: Never Used   Substance and Sexual Activity    Alcohol use: Yes     Comment: rarely    Drug use: No    Sexual activity: Yes     Partners: Male     Birth control/protection: Surgical     /58   Pulse 93   Wt 78 kg (172 lb)   LMP  (LMP Unknown)   SpO2 98%   BMI 30.47 kg/m²       Physical Exam   Constitutional: She is oriented to person, place, and time. She appears well-developed and well-nourished. No distress. HENT:   Head: Normocephalic and atraumatic. Neurological: She is alert and oriented to person, place, and time. She has normal strength and normal reflexes  Gait is normal,  Speech, language and mentation normal   Skin: Skin is warm and dry. No rash noted. She is not diaphoretic. No erythema. Psychiatric: She has a normal mood and affect. Her behavior is normal. Judgment and thought content normal.       ASSESSMENT and PLAN  LOW BACK PAIN  This patient has low back pain radiating into both buttocks when she sits in a softer chair or the couch. Will then go away after 2030 minutes if she gets up and moves around. She will have most trouble with this in the car when driving. With her normal exam I am unwilling to order any imaging procedures as this is not a surgical or interventional problem. [facility] and/or exercise education to see if they can help. I will plan to see her back on as-needed basis. This note will not be viewable in [address]

## 2019-03-01 ENCOUNTER — DOCUMENTATION ONLY (OUTPATIENT)
Dept: NEUROLOGY | Age: 74
End: 2019-03-01

## 2019-03-11 ENCOUNTER — HOSPITAL ENCOUNTER (OUTPATIENT)
Dept: MAMMOGRAPHY | Age: 74
Discharge: HOME | End: 2019-03-11
Attending: INTERNAL MEDICINE
Payer: MEDICARE

## 2019-03-11 DIAGNOSIS — Z12.39 SCREENING BREAST EXAMINATION: ICD-10-CM

## 2019-03-11 PROCEDURE — 77067 SCR MAMMO BI INCL CAD: CPT

## 2019-06-18 ENCOUNTER — OFFICE VISIT (OUTPATIENT)
Dept: INTERNAL MEDICINE CLINIC | Age: 74
End: 2019-06-18

## 2019-06-18 VITALS
SYSTOLIC BLOOD PRESSURE: 142 MMHG | WEIGHT: 169.8 LBS | TEMPERATURE: 97.6 F | DIASTOLIC BLOOD PRESSURE: 83 MMHG | RESPIRATION RATE: 20 BRPM | BODY MASS INDEX: 30.09 KG/M2 | HEART RATE: 84 BPM | HEIGHT: 63 IN | OXYGEN SATURATION: 97 %

## 2019-06-18 DIAGNOSIS — E78.00 PURE HYPERCHOLESTEROLEMIA: ICD-10-CM

## 2019-06-18 DIAGNOSIS — E03.9 ACQUIRED HYPOTHYROIDISM: Primary | ICD-10-CM

## 2019-06-18 DIAGNOSIS — R35.0 URINARY FREQUENCY: ICD-10-CM

## 2019-06-18 DIAGNOSIS — M35.00 SJOGREN'S SYNDROME, WITH UNSPECIFIED ORGAN INVOLVEMENT (HCC): ICD-10-CM

## 2019-06-18 DIAGNOSIS — Z79.899 ON STATIN THERAPY: ICD-10-CM

## 2019-06-18 PROBLEM — E03.8 ADULT ONSET HYPOTHYROIDISM: Status: RESOLVED | Noted: 2017-10-19 | Resolved: 2019-06-18

## 2019-06-18 LAB
A-G RATIO,AGRAT: 1.5 RATIO
ALBUMIN SERPL-MCNC: 4.1 G/DL (ref 3.9–5.4)
ALP SERPL-CCNC: 101 U/L (ref 38–126)
ALT SERPL-CCNC: 20 U/L (ref 9–52)
ANION GAP SERPL CALC-SCNC: 8 MMOL/L
AST SERPL W P-5'-P-CCNC: 23 U/L (ref 14–36)
BILIRUB SERPL-MCNC: 0.5 MG/DL (ref 0.2–1.3)
BILIRUB UR QL: NEGATIVE
BUN SERPL-MCNC: 12 MG/DL (ref 7–17)
BUN/CREATININE RATIO,BUCR: 15 RATIO
CALCIUM SERPL-MCNC: 9.3 MG/DL (ref 8.4–10.2)
CHLORIDE SERPL-SCNC: 100 MMOL/L (ref 98–107)
CHOL/HDL RATIO,CHHD: 3 RATIO (ref 0–4)
CHOLEST SERPL-MCNC: 177 MG/DL (ref 0–200)
CK SERPL-CCNC: 54 U/L (ref 30–135)
CLARITY: CLEAR
CO2 SERPL-SCNC: 32 MMOL/L (ref 22–32)
COLOR UR: ABNORMAL
CREAT SERPL-MCNC: 0.8 MG/DL (ref 0.7–1.2)
ERYTHROCYTE [DISTWIDTH] IN BLOOD BY AUTOMATED COUNT: 13.5 %
GLOBULIN,GLOB: 2.7
GLUCOSE 24H UR-MRATE: NEGATIVE G/(24.H)
GLUCOSE SERPL-MCNC: 95 MG/DL (ref 65–105)
HCT VFR BLD AUTO: 43.1 % (ref 37–51)
HDLC SERPL-MCNC: 59 MG/DL (ref 35–130)
HGB BLD-MCNC: 14.3 G/DL (ref 12–18)
HGB UR QL STRIP: ABNORMAL
KETONES UR QL STRIP.AUTO: NEGATIVE
LDL/HDL RATIO,LDHD: 2 RATIO
LDLC SERPL CALC-MCNC: 94 MG/DL (ref 0–130)
LEUKOCYTE ESTERASE: NEGATIVE
LYMPHOCYTES ABSOLUTE: 2.4 K/UL (ref 0.6–4.1)
LYMPHOCYTES NFR BLD: 32.7 % (ref 10–58.5)
MCH RBC QN AUTO: 30.3 PG (ref 26–32)
MCHC RBC AUTO-ENTMCNC: 33.2 G/DL (ref 30–36)
MCV RBC AUTO: 91.3 FL (ref 80–97)
MONOCYTES ABS-DIF,2141: 0.6 K/UL (ref 0–1.8)
MONOCYTES NFR BLD: 7.7 % (ref 0.1–24)
NEUTROPHILS # BLD: 59.6 % (ref 37–92)
NEUTROPHILS ABS,2156: 4.4 K/UL (ref 2–7.8)
NITRITE UR QL STRIP.AUTO: NEGATIVE
PH UR STRIP: 6 [PH] (ref 5–7)
PLATELET # BLD AUTO: 290 K/UL (ref 140–440)
PMV BLD AUTO: 8 FL
POTASSIUM SERPL-SCNC: 4.1 MMOL/L (ref 3.6–5)
PROT SERPL-MCNC: 6.8 G/DL (ref 6.3–8.2)
PROT UR STRIP-MCNC: NEGATIVE MG/DL
RBC # BLD AUTO: 4.72 M/UL (ref 4.2–6.3)
RBC #/AREA URNS HPF: 0 #/HPF
SODIUM SERPL-SCNC: 140 MMOL/L (ref 137–145)
SP GR UR REFRACTOMETRY: 1.01 (ref 1–1.03)
T4 FREE SERPL-MCNC: 1.34 NG/DL (ref 0.58–2.3)
TRIGL SERPL-MCNC: 121 MG/DL (ref 0–200)
TSH SERPL DL<=0.05 MIU/L-ACNC: 0.58 UIU/ML (ref 0.34–5.6)
UROBILINOGEN UR QL STRIP.AUTO: NEGATIVE
VLDLC SERPL CALC-MCNC: 24 MG/DL
WBC # BLD AUTO: 7.3 K/UL (ref 4.1–10.9)
WBC URNS QL MICRO: 0 #/HPF

## 2019-06-18 NOTE — PROGRESS NOTES
[place]rel [place]  Identified pt with two pt identifiers(name and ). Chief Complaint   Patient presents with    Blood Pressure Check    Cholesterol Problem    Thyroid Problem       1. Have you been to the ER, urgent care clinic since your last visit? Hospitalized since your last visit? No    2. Have you seen or consulted any other health care providers outside of the [address] [place] [address] since your last visit? Include any pap smears or colon screening. No      Would you like to sign up for MyChart today, if you have not already done so? No  If not, would you like information on MyChart, and how to sign up at a later time? No      Medication reconciliation up to date and corrected with patient at this time. Today's provider has been notified of reason for visit, vitals and flowsheets obtained on patients. Reviewed record in preparation for visit, huddled with provider and have obtained necessary documentation.       Health Maintenance Due   Topic    Hepatitis C Screening     DTaP/Tdap/Td series (1 - Tdap)    FOBT Q 1 YEAR AGE 50-75     GLAUCOMA SCREENING Q2Y     Pneumococcal 65+ years (2 of 2 - PPSV23)       Wt Readings from Last 3 Encounters:   19 169 lb 12.8 oz (77 kg)   19 172 lb (78 kg)   18 168 lb (76.2 kg)     Temp Readings from Last 3 Encounters:   19 97.6 °F (36.4 °C) (Oral)   18 98.7 °F (37.1 °C) (Oral)   18 97.7 °F (36.5 °C)     BP Readings from Last 3 Encounters:   19 142/83   19 110/58   18 122/80     Pulse Readings from Last 3 Encounters:   19 84   19 93   18 88     Vitals:    19 1149   BP: 142/83   Pulse: 84   Resp: 20   Temp: 97.6 °F (36.4 °C)   TempSrc: Oral   SpO2: 97%   Weight: 169 lb 12.8 oz (77 kg)   Height: 5' 3\" (1.6 m)   PainSc:   0 - No pain         Learning Assessment:  :     Learning Assessment 2018   PRIMARY LEARNER Patient Patient Patient   PRIMARY LANGUAGE ENGLISH ENGLISH ENGLISH   LEARNER PREFERENCE PRIMARY DEMONSTRATION DEMONSTRATION DEMONSTRATION   ANSWERED BY patient patient patient   RELATIONSHIP SELF SELF SELF       Depression Screening:  :     3 most recent PHQ Screens 2/22/2019   Little interest or pleasure in doing things Not at all   Feeling down, depressed, irritable, or hopeless Not at all   Total Score PHQ 2 0       Fall Risk Assessment:  :     Fall Risk Assessment, last 12 mths 2/22/2019   Able to walk? Yes   Fall in past 12 months? No   Fall with injury? -   Number of falls in past 12 months -   Fall Risk Score -       Abuse Screening:  :     No flowsheet data found. ADL Screening:  :     No flowsheet data found.

## 2019-06-19 NOTE — PROGRESS NOTES
This note will not be viewable in [address] Jeanna Skinner is a 76 y.o. female and presents with Blood Pressure Check; Cholesterol Problem; and Thyroid Problem  . Subjective:    Mrs. Lorrayne Landau presents today for six-month follow-up of hypertension, hyperlipidemia, and hypothyroidism. She is downsizing and sold her house. She is moving to a townhouse and has about 2 weeks to pack up and move. Otherwise she has felt well and denies any significant complaint. She has no headache, shortness of breath, chest pain, palpitations, PND, orthopnea, or pedal edema. Review of Systems  Constitutional:   Eyes:   negative for visual disturbance and irritation  ENT:   negative for tinnitus,sore throat,nasal congestion,ear pains. hoarseness  Respiratory:  negative for cough, hemoptysis, dyspnea,wheezing  CV:   negative for chest pain, palpitations, lower extremity edema  GI:   negative for nausea, vomiting, diarrhea, abdominal pain,melena  Endo:               negative for polyuria,polydipsia,polyphagia,heat intolerance  Genitourinary: negative for frequency, dysuria and hematuria  Integumentary: negative for rash and pruritus  Hematologic:  negative for easy bruising and gum/nose bleeding  Musculoskel: negative for myalgias, arthralgias, back pain, muscle weakness, joint pain  Neurological:  negative for headaches, dizziness, vertigo, memory problems and gait   Behavl/Psych: negative for feelings of anxiety, depression, mood changes    Past Medical History:   Diagnosis Date    Adult onset hypothyroidism 10/19/2017    Arthritis     feet and hands    Back pain with radiation 10/19/2017    Breast cyst 6 months ago     right breast     Disorder of bone and cartilage 10/19/2017    GERD (gastroesophageal reflux disease)     High risk medication use 10/19/2017    Hip pain, acute, left 10/19/2017    hypercholesteremia     elevatd cholesterol    Hyperlipidemia 10/19/2017    Hypothyroid     hypothyroid    Hypothyroid 10/19/2017    Irritable bowel syndrome with constipation 10/19/2017    Knee pain 10/19/2017    Menopausal disorder 10/19/2017    Migraine 10/19/2017    Migraines     Neurological disorder     migraines    On statin therapy 6/18/2019    Sjoegren syndrome     dry eyes    Sjogren's syndrome ([address] 10/19/2017    pt states she was tested for this and it was not sjogrens    Urinary frequency 10/19/2017     Past Surgical History:   Procedure Laterality Date    ABDOMEN SURGERY PROC UNLISTED  2011    removed \"sludge\" from duct in gallbladder    COLONOSCOPY Left 6/1/2018    COLONOSCOPY performed by Mylene Cyr MD at [address] HX BREAST BIOPSY Left 11 years ago     negative    HX GYN      hysterectomy    HX ORTHOPAEDIC  11/9/11    SPINE LUMBAR LAMINECTOMY - L4-5 LAMINECTOMY FOR REMOVAL OF EXTRA DURAL LESION ON THE LEFT - benign    HX OTHER SURGICAL      ganglion cyst removed finger    HX TONSILLECTOMY       Social History     Socioeconomic History    Marital status:      Spouse name: Not on file    Number of children: Not on file    Years of education: Not on file    Highest education level: Not on file   Tobacco Use    Smoking status: Never Smoker    Smokeless tobacco: Never Used   Substance and Sexual Activity    Alcohol use: Yes     Comment: rarely    Drug use: No    Sexual activity: Yes     Partners: Male     Birth control/protection: Surgical     Family History   Problem Relation Age of Onset    Heart Disease Mother         \"shrinking heart valve\"     Current Outpatient Medications   Medication Sig Dispense Refill    amitriptyline (ELAVIL) 25 mg tablet Take 2 Tabs by mouth nightly. 180 Tab 3    simvastatin (ZOCOR) 20 mg tablet TAKE 1 TABLET EVERY DAY 90 Tab 3    levothyroxine (SYNTHROID) 125 mcg tablet TAKE 1 TABLET EVERY DAY FOR THYROID 90 Tab 3    omeprazole (PRILOSEC OTC) 20 mg tablet Take 20 mg by mouth daily.       pilocarpine (SALAGEN) 5 mg tablet TAKE 1 TABLET BY MOUTH 4 TIMES A DAY (Patient taking differently: TAKE ONE TABLET DAILY) 120 Tab 3     Allergies   Allergen Reactions    Latex Rash     Bandages causes a rash if left on for more than a couple of days. She buys latex free band-aids.  Codeine Nausea and Vomiting       Objective:  Visit Vitals  /83 (BP 1 Location: Left arm, BP Patient Position: Sitting)   Pulse 84   Temp 97.6 °F (36.4 °C) (Oral)   Resp 20   Ht 5' 3\" (1.6 m)   Wt 169 lb 12.8 oz (77 kg)   LMP  (LMP Unknown)   SpO2 97%   BMI 30.08 kg/m²     Physical Exam:   General appearance - alert, well appearing, and in no distress  Mental status - alert, oriented to person, place, and time  EYE-NIEVES, EOMI, fundi normal, corneas normal, no foreign bodies  ENT-ENT exam normal, no neck nodes or sinus tenderness  Nose - normal and patent, no erythema, discharge or polyps  Mouth - mucous membranes moist, pharynx normal without lesions  Neck - supple, no significant adenopathy   Chest - clear to auscultation, no wheezes, rales or rhonchi, symmetric air entry   Heart - normal rate, regular rhythm, normal S1, S2, no murmurs, rubs, clicks or gallops   Abdomen - soft, nontender, nondistended, no masses or organomegaly  Lymph- no adenopathy palpable  Ext-peripheral pulses normal, no pedal edema, no clubbing or cyanosis  Skin-Warm and dry.  no hyperpigmentation, vitiligo, or suspicious lesions  Neuro -alert, oriented, normal speech, no focal findings or movement disorder noted  Musculoskeletal- FROM, no bony abnormalities, no point tenderness    Results for orders placed or performed in visit on 06/18/19   CBC WITH AUTOMATED DIFF   Result Value Ref Range    WBC 7.3 4.1 - 10.9 K/uL    RBC 4.72 4.20 - 6.30 M/uL    HGB 14.3 12.0 - 18.0 g/dL    HCT 43.1 37.0 - 51.0 %    MCH 30.3 26.0 - 32.0 pg    MCHC 33.2 30.0 - 36.0 g/dL    MCV 91.3 80.0 - 97.0 fL    RDW 13.5 %    PLATELET 569.3 140.3 - 440.0 K/uL    MEAN PLATELET VOLUME 8.0 fL    Neutrophils abs 4.4 2.0 - 7.8 K/uL Neutrophils 59.6 37.0 - 92.0 %    Monocytes abs-DIF 0.6 0.0 - 1.8 K/uL    MONOCYTES 7.7 0.1 - 24.0 %    Lymphocytes Absolute 2.4 0.6 - 4.1 K/uL    LYMPHOCYTES 32.7 10.0 - 58.5 %   CK   Result Value Ref Range    CK 54.00 30.00 - 135.00 U/L   LIPID PANEL   Result Value Ref Range    Cholesterol, total 177 0 - 200 mg/dL    Triglyceride 121 0 - 200 mg/dL    HDL Cholesterol 59 35 - 130 mg/dL    VLDL 24 mg/dL    LDL, calculated 94 0 - 130 mg/dL    CHOL/HDL Ratio 3 0 - 4 Ratio    LDL/HDL Ratio 2 Ratio   METABOLIC PANEL, COMPREHENSIVE   Result Value Ref Range    Glucose 95 65 - 105 mg/dL    BUN 12.0 7.0 - 17.0 mg/dL    Creatinine 0.8 0.7 - 1.2 mg/dL    Sodium 140 137 - 145 mmol/L    Potassium 4.1 3.6 - 5.0 mmol/L    Chloride 100 98 - 107 mmol/L    CO2 32.0 22.0 - 32.0 mmol/L    Calcium 9.3 8.4 - 10.2 mg/dl    Protein, total 6.8 6.3 - 8.2 g/dL    Albumin 4.1 3.9 - 5.4 g/dL    AST (SGOT) 23.0 14.0 - 36.0 U/L    ALT (SGPT) 20 9 - 52 U/L    Alk.  phosphatase 101 38 - 126 U/L    Bilirubin, total 0.5 0.2 - 1.3 mg/dL    BUN/Creatinine ratio 15 Ratio    GFR est AA >60 mL/min/1.73m2    GFR est non-AA >60 mL/min/1.73m2    Globulin 2.70     A-G Ratio 1.5 Ratio    Anion gap 8 mmol/L   URINALYSIS W/O MICRO   Result Value Ref Range    Color Pale Yellow Pale Yellow - Yellow    CLARITY clear Clear    Glucose urine, 24 hr Negative Negative    Ketone Negative Negative    Bilirubin Negative Negative    Specific gravity 1.015 1.000 - 1.030    pH (UA) 6 5 - 7    Blood 2+ (A) Negative    Protein Negative Negative    Urobilinogen Negative Negative    Nitrites Negative Negative    Leukocyte Esterase Negative Negative   T4, FREE   Result Value Ref Range    T4, Free 1.34 0.58 - 2.30 ng/dL   TSH 3RD GENERATION   Result Value Ref Range    TSH, 3rd generation 0.58 0.34 - 5.60 uIU/mL   URINALYSIS; MICROSCOPIC ONLY   Result Value Ref Range    WBC 0 0 #/HPF    RBC 0 0 #/HPF     All results for lab orders may not have been returned by the time this encountered was closed. Assessment/Plan:       ICD-10-CM ICD-9-CM    1. Acquired hypothyroidism E03.9 244.9 T4, FREE      TSH 3RD GENERATION   2. Pure hypercholesterolemia E78.00 272.0 LIPID PANEL   3. On statin therapy Z79.899 N83.90 CK      METABOLIC PANEL, COMPREHENSIVE   4. Urinary frequency R35.0 788.41 URINALYSIS W/O MICRO   5. Sjogren's syndrome, with unspecified organ involvement ([facility] M35.00 710.2 CBC WITH AUTOMATED DIFF       Orders Placed This Encounter    CBC WITH AUTOMATED DIFF ([facility])    CK ([facility])    LIPID PANEL ([facility])    METABOLIC PANEL, COMPREHENSIVE ([facility])    URINALYSIS W/O MICRO ([facility])    T4, FREE ([facility])    TSH 3RD GENERATION ([facility])    URINALYSIS; MICROSCOPIC ONLY       Plan:    Medical problems as outlined above currently stable. Follow-up labs as ordered. Further recommendations based on labs as ordered. Return to clinic in 6 months for a Medicare annual wellness visit unless otherwise indicated. I have reviewed with the patient details of the assessment and plan and all questions were answered. Relevent patient education was performed. Verbal and/or written instructions (see AVS) provided. The most recent lab findings were reviewed with the patient. Plan was discussed with patient who verbal expressed understanding. An After Visit Summary was printed and given to the patient.       Cuauhtemoc Rao MD

## 2019-10-30 RX ORDER — SIMVASTATIN 20 MG/1
TABLET, FILM COATED ORAL
Qty: 90 TAB | Refills: 3 | Status: SHIPPED | OUTPATIENT
Start: 2019-10-30 | End: 2020-01-24 | Stop reason: SDUPTHER

## 2020-01-24 RX ORDER — AMITRIPTYLINE HYDROCHLORIDE 25 MG/1
50 TABLET, FILM COATED ORAL
Qty: 180 TAB | Refills: 3 | Status: SHIPPED | OUTPATIENT
Start: 2020-01-24 | End: 2020-02-27

## 2020-01-24 RX ORDER — SIMVASTATIN 20 MG/1
TABLET, FILM COATED ORAL
Qty: 90 TAB | Refills: 3 | Status: SHIPPED | OUTPATIENT
Start: 2020-01-24 | End: 2020-12-16

## 2020-01-24 RX ORDER — LEVOTHYROXINE SODIUM 125 UG/1
TABLET ORAL
Qty: 90 TAB | Refills: 3 | Status: SHIPPED | OUTPATIENT
Start: 2020-01-24 | End: 2021-01-25

## 2020-01-24 NOTE — TELEPHONE ENCOUNTER
Requested Prescriptions     Pending Prescriptions Disp Refills    simvastatin (ZOCOR) 20 mg tablet 90 Tab 3    amitriptyline (ELAVIL) 25 mg tablet 180 Tab 3     Sig: Take 2 Tabs by mouth nightly.     levothyroxine (SYNTHROID) 125 mcg tablet 90 Tab 3       Last Refill: simvastatin 10/30/19 / amitriptyline 1/21/19 / levothyroxine 12/3/18  Next Appointment:1/31/20

## 2020-01-31 ENCOUNTER — OFFICE VISIT (OUTPATIENT)
Dept: INTERNAL MEDICINE CLINIC | Age: 75
End: 2020-01-31

## 2020-01-31 VITALS
WEIGHT: 165.4 LBS | TEMPERATURE: 97.6 F | HEART RATE: 81 BPM | OXYGEN SATURATION: 92 % | RESPIRATION RATE: 16 BRPM | BODY MASS INDEX: 29.3 KG/M2 | HEIGHT: 63 IN | SYSTOLIC BLOOD PRESSURE: 104 MMHG | DIASTOLIC BLOOD PRESSURE: 66 MMHG

## 2020-01-31 DIAGNOSIS — Z13.39 SCREENING FOR ALCOHOLISM: ICD-10-CM

## 2020-01-31 DIAGNOSIS — M35.00 SJOGREN'S SYNDROME, WITH UNSPECIFIED ORGAN INVOLVEMENT (HCC): ICD-10-CM

## 2020-01-31 DIAGNOSIS — Z00.00 MEDICARE ANNUAL WELLNESS VISIT, SUBSEQUENT: Primary | ICD-10-CM

## 2020-01-31 DIAGNOSIS — Z13.6 SCREENING FOR ISCHEMIC HEART DISEASE: ICD-10-CM

## 2020-01-31 DIAGNOSIS — Z13.31 SCREENING FOR DEPRESSION: ICD-10-CM

## 2020-01-31 DIAGNOSIS — E78.00 PURE HYPERCHOLESTEROLEMIA: ICD-10-CM

## 2020-01-31 DIAGNOSIS — E03.9 ACQUIRED HYPOTHYROIDISM: ICD-10-CM

## 2020-01-31 DIAGNOSIS — Z79.899 ON STATIN THERAPY: ICD-10-CM

## 2020-01-31 DIAGNOSIS — Z13.1 SCREENING FOR DIABETES MELLITUS: ICD-10-CM

## 2020-01-31 DIAGNOSIS — R35.0 URINARY FREQUENCY: ICD-10-CM

## 2020-01-31 NOTE — PROGRESS NOTES
Chief Complaint   Patient presents with    Annual Wellness Visit       Depression Risk Factor Screening:     3 most recent PHQ Screens 2020   Little interest or pleasure in doing things Not at all   Feeling down, depressed, irritable, or hopeless Not at all   Total Score PHQ 2 0       Functional Ability and Level of Safety:     Activities of Daily Living  ADL Assessment 2020   Feeding yourself No Help Needed   Getting from bed to chair No Help Needed   Getting dressed No Help Needed   Bathing or showering No Help Needed   Walk across the room (includes cane/walker) No Help Needed   Using the telphone No Help Needed   Taking your medications No Help Needed   Preparing meals No Help Needed   Managing money (expenses/bills) No Help Needed   Moderately strenuous housework (laundry) No Help Needed   Shopping for personal items (toiletries/medicines) No Help Needed   Shopping for groceries No Help Needed   Driving No Help Needed   Climbing a flight of stairs No Help Needed   Getting to places beyond walking distances No Help Needed       Fall Risk  Fall Risk Assessment, last 12 mths 2020   Able to walk? Yes   Fall in past 12 months? Yes   Fall with injury? No   Number of falls in past 12 months 1   Fall Risk Score 1       Abuse Screen  Abuse Screening Questionnaire 2020   Do you ever feel afraid of your partner? N   Are you in a relationship with someone who physically or mentally threatens you? N   Is it safe for you to go home? Y     Reviewed record in preparation for visit and have obtained necessary documentation. Identified pt with two pt identifiers(name and ).     Chief Complaint   Patient presents with   Dany Diaz Annual Wellness Visit      Wt Readings from Last 3 Encounters:   19 169 lb 12.8 oz (77 kg)   19 172 lb (78 kg)   18 168 lb (76.2 kg)     Temp Readings from Last 3 Encounters:   19 97.6 °F (36.4 °C) (Oral)   18 98.7 °F (37.1 °C) (Oral)   18 97.7 °F (36.5 °C)     BP Readings from Last 3 Encounters:   06/18/19 142/83   02/22/19 110/58   08/17/18 122/80     Pulse Readings from Last 3 Encounters:   06/18/19 84   02/22/19 93   08/17/18 88       Coordination of Care Questionnaire:  :     1) Have you been to an emergency room, urgent care clinic since your last visit? No     Hospitalized since your last visit? No               2) Have you seen or consulted any other health care providers outside of [address] [place] [address] since your last visit?  No             Patient Care Team   Patient Care Team:  John Gutierrez MD as PCP - General (Internal Medicine)  John Gutierrez MD as PCP - [facility] Empaneled Provider

## 2020-01-31 NOTE — PROGRESS NOTES
This note will not be viewable in [address] Lou So is a 76 y.o. female and presents with Annual Wellness Visit  . Subjective:    Mrs. Torey Mar presents today for follow-up of hypothyroidism, reflux, Sjogren's syndrome, and hyperlipidemia. She is doing well on her current medical regimen. She denies any side effects from her current medicines. She has no shortness of breath, chest pain, palpitations, PND, orthopnea, or pedal edema. Review of Systems  Constitutional:   Eyes:   negative for visual disturbance and irritation  ENT:   negative for tinnitus,sore throat,nasal congestion,ear pains. hoarseness  Respiratory:  negative for cough, hemoptysis, dyspnea,wheezing  CV:   negative for chest pain, palpitations, lower extremity edema  GI:   negative for nausea, vomiting, diarrhea, abdominal pain,melena  Endo:               negative for polyuria,polydipsia,polyphagia,heat intolerance  Genitourinary: negative for frequency, dysuria and hematuria  Integumentary: negative for rash and pruritus  Hematologic:  negative for easy bruising and gum/nose bleeding  Musculoskel: negative for myalgias, arthralgias, back pain, muscle weakness, joint pain  Neurological:  negative for headaches, dizziness, vertigo, memory problems and gait   Behavl/Psych: negative for feelings of anxiety, depression, mood changes    Past Medical History:   Diagnosis Date    Adult onset hypothyroidism 10/19/2017    Arthritis     feet and hands    Back pain with radiation 10/19/2017    Breast cyst 6 months ago     right breast     Disorder of bone and cartilage 10/19/2017    GERD (gastroesophageal reflux disease)     High risk medication use 10/19/2017    Hip pain, acute, left 10/19/2017    hypercholesteremia     elevatd cholesterol    Hyperlipidemia 10/19/2017    Hypothyroid     hypothyroid    Hypothyroid 10/19/2017    Irritable bowel syndrome with constipation 10/19/2017    Knee pain 10/19/2017    Menopausal disorder 10/19/2017    Migraine 10/19/2017    Migraines     Neurological disorder     migraines    On statin therapy 6/18/2019    Sjoegren syndrome     dry eyes    Sjogren's syndrome ([address] 10/19/2017    pt states she was tested for this and it was not sjogrens    Urinary frequency 10/19/2017     Past Surgical History:   Procedure Laterality Date    ABDOMEN SURGERY PROC UNLISTED  2011    removed \"sludge\" from duct in gallbladder    COLONOSCOPY Left 6/1/2018    COLONOSCOPY performed by Jean Carlos Heath MD at [address] HX BREAST BIOPSY Left 11 years ago     negative    HX GYN      hysterectomy    HX ORTHOPAEDIC  11/9/11    SPINE LUMBAR LAMINECTOMY - L4-5 LAMINECTOMY FOR REMOVAL OF EXTRA DURAL LESION ON THE LEFT - benign    HX OTHER SURGICAL      ganglion cyst removed finger    HX TONSILLECTOMY       Social History     Socioeconomic History    Marital status:      Spouse name: Not on file    Number of children: Not on file    Years of education: Not on file    Highest education level: Not on file   Tobacco Use    Smoking status: Never Smoker    Smokeless tobacco: Never Used   Substance and Sexual Activity    Alcohol use: Yes     Comment: rarely    Drug use: No    Sexual activity: Yes     Partners: Male     Birth control/protection: Surgical     Family History   Problem Relation Age of Onset    Heart Disease Mother         \"shrinking heart valve\"     Current Outpatient Medications   Medication Sig Dispense Refill    simvastatin (ZOCOR) 20 mg tablet TAKE 1 TABLET EVERY DAY 90 Tab 3    amitriptyline (ELAVIL) 25 mg tablet Take 2 Tabs by mouth nightly. 180 Tab 3    levothyroxine (SYNTHROID) 125 mcg tablet TAKE 1 TABLET EVERY DAY FOR THYROID 90 Tab 3    omeprazole (PRILOSEC OTC) 20 mg tablet Take 20 mg by mouth daily.       pilocarpine (SALAGEN) 5 mg tablet TAKE 1 TABLET BY MOUTH 4 TIMES A DAY (Patient taking differently: TAKE ONE TABLET DAILY) 120 Tab 3     Allergies   Allergen Reactions  Latex Rash     Bandages causes a rash if left on for more than a couple of days. She buys latex free band-aids.  Codeine Nausea and Vomiting       Objective:  Visit Vitals  /66 (BP 1 Location: Left arm, BP Patient Position: Sitting)   Pulse 81   Temp 97.6 °F (36.4 °C) (Oral)   Resp 16   Ht 5' 3\" (1.6 m)   Wt 165 lb 6.4 oz (75 kg)   LMP  (LMP Unknown)   SpO2 92%   BMI 29.30 kg/m²     Physical Exam:   General appearance - alert, well appearing, and in no distress  Mental status - alert, oriented to person, place, and time  EYE-NIEVES, EOMI, fundi normal, corneas normal, no foreign bodies  ENT-ENT exam normal, no neck nodes or sinus tenderness  Nose - normal and patent, no erythema, discharge or polyps  Mouth - mucous membranes moist, pharynx normal without lesions  Neck - supple, no significant adenopathy   Chest - clear to auscultation, no wheezes, rales or rhonchi, symmetric air entry   Heart - normal rate, regular rhythm, normal S1, S2, no murmurs, rubs, clicks or gallops   Abdomen - soft, nontender, nondistended, no masses or organomegaly  Lymph- no adenopathy palpable  Ext-peripheral pulses normal, no pedal edema, no clubbing or cyanosis  Skin-Warm and dry. no hyperpigmentation, vitiligo, or suspicious lesions  Neuro -alert, oriented, normal speech, no focal findings or movement disorder noted  Musculoskeletal- FROM, no bony abnormalities, no point tenderness    No results found for this visit on 01/31/20. All results for lab orders may not have been returned by the time this encountered was closed. Assessment/Plan:       ICD-10-CM ICD-9-CM    1. Acquired hypothyroidism E03.9 244.9 TSH 3RD GENERATION      T4, FREE   2. Pure hypercholesterolemia E78.00 272.0 CK      LIPID PANEL   3. On statin therapy Z79.899 V58.69 CK      LIPID PANEL      METABOLIC PANEL, COMPREHENSIVE   4. Urinary frequency R35.0 788.41 URINALYSIS W/O MICRO   5.  S[facility]og[facility]'s syndrome, with unspecified organ involvement [facility] M35.00 710.2        Orders Placed This Encounter    CK ([facility])     Standing Status:   Future     Standing Expiration Date:   2/29/2020    LIPID PANEL ([facility])     Standing Status:   Future     Standing Expiration Date:   4/70/6192    METABOLIC PANEL, COMPREHENSIVE ([facility])     Standing Status:   Future     Standing Expiration Date:   2/29/2020    TSH 3RD GENERATION ([facility])     Standing Status:   Future     Standing Expiration Date:   2/29/2020    T4, FREE ([facility])     Standing Status:   Future     Standing Expiration Date:   2/29/2020    URINALYSIS W/O MICRO ([facility])     Standing Status:   Future     Standing Expiration Date:   2/29/2020       Plan:    Continue current medical regimen as outlined above. Follow-up labs as ordered. Return to clinic in 6 months unless otherwise indicated. I have reviewed with the patient details of the assessment and plan and all questions were answered. Relevent patient education was performed. Verbal and/or written instructions (see AVS) provided. The most recent lab findings were reviewed with the patient. Plan was discussed with patient who verbal expressed understanding. An After Visit Summary was printed and given to the patient. Cuauhtemoc Rao MD    This is the Subsequent Medicare Annual Wellness Exam, performed 12 months or more after the Initial AWV or the last Subsequent AWV    I have reviewed the patient's medical history in detail and updated the computerized patient record.      History     Patient Active Problem List   Diagnosis Code    Leukocytosis D72.829    Gait difficulty R26.9    Menopausal disorder N95.9    Irritable bowel syndrome with constipation K58.1    Urinary frequency R35.0    Sjogren's syndrome (HCC) M35.00    Disorder of bone and cartilage M89.9, M94.9    Back pain with radiation M54.9    Hip pain, acute, left M25.552    Migraine G43.909    Knee pain M25.569    Hypothyroid E03.9    Pure hypercholesterolemia E78.00    On statin therapy Z79.899     Past Medical History:   Diagnosis Date    Adult onset hypothyroidism 10/19/2017    Arthritis     feet and hands    Back pain with radiation 10/19/2017    Breast cyst 6 months ago     right breast     Disorder of bone and cartilage 10/19/2017    GERD (gastroesophageal reflux disease)     High risk medication use 10/19/2017    Hip pain, acute, left 10/19/2017    hypercholesteremia     elevatd cholesterol    Hyperlipidemia 10/19/2017    Hypothyroid     hypothyroid    Hypothyroid 10/19/2017    Irritable bowel syndrome with constipation 10/19/2017    Knee pain 10/19/2017    Menopausal disorder 10/19/2017    Migraine 10/19/2017    Migraines     Neurological disorder     migraines    On statin therapy 6/18/2019    Sjoegren syndrome     dry eyes    Sjogren's syndrome ([facility] 10/19/2017    pt states she was tested for this and it was not sjogrens    Urinary frequency 10/19/2017      Past Surgical History:   Procedure Laterality Date    ABDOMEN SURGERY PROC UNLISTED  2011    removed \"sludge\" from duct in gallbladder    COLONOSCOPY Left 6/1/2018    COL[facility] at [facility] ENDOSCOPY    HX BREAST BIOPSY Left 11 years ago     negative    HX GYN      hysterectomy    HX ORTHOPAEDIC  11/9/11    SPINE LUMBAR LAMINECTOMY - L4-5 LAMINECTOMY FOR REMOVAL OF EXTRA DURAL LESION ON THE LEFT - benign    HX OTHER SURGICAL      ganglion cyst removed finger    HX TONSILLECTOMY       Current Outpatient Medications   Medication Sig Dispense Refill    simvastatin (ZOCOR) 20 mg tablet TAKE 1 TABLET EVERY DAY 90 Tab 3    amitriptyline (ELAVIL) 25 mg tablet Take 2 Tabs by mouth nightly. 180 Tab 3    levothyroxine (SYNTHROID) 125 mcg tablet TAKE 1 TABLET EVERY DAY FOR THYROID 90 Tab 3    omeprazole (PRILOSEC OTC) 20 mg tablet Take 20 mg by mouth daily.       pilocarpine (SALAGEN) 5 mg tablet TAKE 1 TABLET BY MOUTH 4 TIMES A DAY (Patient taking differently: TAKE ONE TABLET DAILY) 120 Tab 3     Allergies   Allergen Reactions    Latex Rash     Bandages causes a rash if left on for more than a couple of days. She buys latex free band-aids.  Codeine Nausea and Vomiting       Family History   Problem Relation Age of Onset    Heart Disease Mother         \"shrinking heart valve\"     Social History     Tobacco Use    Smoking status: Never Smoker    Smokeless tobacco: Never Used   Substance Use Topics    Alcohol use: Yes     Comment: rarely       Depression Risk Factor Screening:     3 most recent PHQ Screens 1/31/2020   Little interest or pleasure in doing things Not at all   Feeling down, depressed, irritable, or hopeless Not at all   Total Score PHQ 2 0       Alcohol Risk Factor Screening:   Do you average 1 drink per night or more than 7 drinks a week:  No    On any one occasion in the past three months have you have had more than 3 drinks containing alcohol:  No      Functional Ability and Level of Safety:   Hearing: Hearing is good. Activities of Daily Living: The home contains: no safety equipment. Patient does total self care    Ambulation: with no difficulty    Fall Risk:  Fall Risk Assessment, last 12 mths 1/31/2020   Able to walk? Yes   Fall in past 12 months? Yes   Fall with injury? No   Number of falls in past 12 months 1   Fall Risk Score 1       Abuse Screen:  Patient is not abused    Cognitive Screening   Has your family/caregiver stated any concerns about your memory: no  Cognitive Screening: Normal - Verbal Fluency Test    Patient Care Team   Patient Care Team:  Carolyn Arambula MD as PCP - General (Internal Medicine)  Carolyn Arambula MD as PCP - [facility]    Assessment/Plan   Education and counseling provided:  Are appropriate based on today's review and evaluation    Diagnoses and all orders for this visit:    1. Medicare annual wellness visit, subsequent    2. Acquired hypothyroidism  -     TSH 3RD GENERATION; Future  -     T4, FREE; Future    3. Pure hypercholesterolemia  -     CK; Future  -     LIPID PANEL; Future    4. On statin therapy  -     CK; Future  -     LIPID PANEL; Future  -     METABOLIC PANEL, COMPREHENSIVE; Future    5. Urinary frequency  -     URINALYSIS W/O MICRO; Future    6. Sjogren's syndrome, with unspecified organ involvement ([facility]    7. Screening for alcoholism  -     [place] ANNUAL ALCOHOL SCREEN 15 MIN    8. Screening for depression  -     DEPRESSION SCREEN ANNUAL    9. Screening for diabetes mellitus    10.  Screening for ischemic heart disease        Health Maintenance Due   Topic Date Due    Hepatitis C Screening  1945    DTaP/Tdap/Td series (1 - Tdap) 05/01/1956    GLAUCOMA SCREENING Q2Y  05/01/2010    Pneumococcal 65+ years (2 of 2 - PPSV23) 10/17/2017    MEDICARE YEARLY EXAM  07/27/2019

## 2020-01-31 NOTE — PATIENT INSTRUCTIONS
Medicare Wellness Visit, Female     The best way to live healthy is to have a lifestyle where you eat a well-balanced diet, exercise regularly, limit alcohol use, and quit all forms of tobacco/nicotine, if applicable. Regular preventive services are another way to keep healthy. Preventive services (vaccines, screening tests, monitoring & exams) can help personalize your care plan, which helps you manage your own care. Screening tests can find health problems at the earliest stages, when they are easiest to treat. [facility] follows the current, evidence-based guidelines published by the [facility] Par[facility] ([facility]) when recommending preventive services for our patients. Because we follow these guidelines, sometimes recommendations change over time as research supports it. (For example, mammograms used to be recommended annually. Even though Medicare will still pay for an annual mammogram, the newer guidelines recommend a mammogram every two years for women of average risk). Of course, you and your doctor may decide to screen more often for some diseases, based on your risk and your co-morbidities (chronic disease you are already diagnosed with). Preventive services for you include:  - Medicare offers their members a free annual wellness visit, which is time for you and your primary care provider to discuss and plan for your preventive service needs. Take advantage of this benefit every year!  -All adults over the age of 72 should receive the recommended pneumonia vaccines. Current USPSTF guidelines recommend a series of two vaccines for the best pneumonia protection.   -All adults should have a flu vaccine yearly and a tetanus vaccine every 10 years.   -All adults age 48 and older should receive the shingles vaccines (series of two vaccines).       -All adults age 38-68 who are overweight should have a diabetes screening test once every three years.   -All adults born between [birth year] and 1965 should be screened once for Hepatitis C.  -Other screening tests and preventive services for persons with diabetes include: an eye exam to screen for diabetic retinopathy, a kidney function test, a foot exam, and stricter control over your cholesterol.   -Cardiovascular screening for adults with routine risk involves an electrocardiogram (ECG) at intervals determined by your doctor.   -Colorectal cancer screenings should be done for adults age 54-65 with no increased risk factors for colorectal cancer. There are a number of acceptable methods of screening for this type of cancer. Each test has its own benefits and drawbacks. Discuss with your doctor what is most appropriate for you during your annual wellness visit. The different tests include: colonoscopy (considered the best screening method), a fecal occult blood test, a fecal DNA test, and sigmoidoscopy.    -A bone mass density test is recommended when a woman turns 65 to screen for osteoporosis. This test is only recommended one time, as a screening. Some providers will use this same test as a disease monitoring tool if you already have osteoporosis. -Breast cancer screenings are recommended every other year for women of normal risk, age 54-69.  -Cervical cancer screenings for women over age 72 are only recommended with certain risk factors.      Here is a list of your current Health Maintenance items (your personalized list of preventive services) with a due date:  Health Maintenance Due   Topic Date Due    Hepatitis C Test  1945    DTaP/Tdap/Td  (1 - Tdap) 05/01/1956    Glaucoma Screening   05/01/2010    Pneumococcal Vaccine (2 of 2 - PPSV23) 10/17/2017    Annual Well Visit  07/27/2019         All Medicare beneficiaries aged 48 and older are covered; however, when a beneficiary is at high risk, there is no minimum age required to receive a screening colonoscopy or a barium enema rendered as an alternative to a screening colonoscopy. The following are the coverage criteria for each colorectal cancer screening test/procedure. Screening FOBT  Medicare provides coverage of a screening FOBT annually (i.e., at least 11 months have passed following the month in which the last covered screening FOBT was performed) for beneficiaries aged 48 and older. This screening requires a written order from the beneficiarys attending physician. NOTE: Medicare will only provide coverage for one FOBT per year: either HCPCS code  or CPT code 55350, but not both. Screening Colonoscopy  For Beneficiaries at [facility] for Developing Colorectal Cancer  Medicare provides coverage of a screening colonoscopy (HCPCS code ) once every 2 years for beneficiaries at high risk for developing colorectal cancer (i.e., at least 23 months have passed following the month in which the last covered screening colonoscopy [HCPCS code ] was performed). For Beneficiaries Not at [address] [place] [address] for Developing Colorectal Cancer  Medicare provides coverage of a screening colonoscopy (HCPCS code ) for beneficiaries who do not meet the criteria for being at high risk for developing colorectal cancer once every 10 years (i.e., at least 119 months have passed following the month in which the last covered screening colonoscopy [HCPCS code ] was performed). If the beneficiary otherwise qualifies to have a covered screening colonoscopy (HCPCS code ) based on the above but has had a covered screening flexible sigmoidoscopy (HCPCS code ), then Medicare may cover a screening colonoscopy (HCPCS code ) only after at least 47 months have passed following the month in which the last covered screening flexible sigmoidoscopy (HCPCS code ) was performed.

## 2020-02-05 ENCOUNTER — APPOINTMENT (OUTPATIENT)
Dept: INTERNAL MEDICINE CLINIC | Age: 75
End: 2020-02-05

## 2020-02-05 DIAGNOSIS — E78.00 PURE HYPERCHOLESTEROLEMIA: ICD-10-CM

## 2020-02-05 DIAGNOSIS — Z79.899 ON STATIN THERAPY: ICD-10-CM

## 2020-02-05 DIAGNOSIS — R35.0 URINARY FREQUENCY: ICD-10-CM

## 2020-02-05 DIAGNOSIS — E03.9 ACQUIRED HYPOTHYROIDISM: ICD-10-CM

## 2020-02-05 LAB
A-G RATIO,AGRAT: 1.4 RATIO
ALBUMIN SERPL-MCNC: 3.8 G/DL (ref 3.9–5.4)
ALP SERPL-CCNC: 69 U/L (ref 38–126)
ALT SERPL-CCNC: 15 U/L (ref 0–35)
ANION GAP SERPL CALC-SCNC: 11 MMOL/L
AST SERPL W P-5'-P-CCNC: 23 U/L (ref 14–36)
BACTERIA,BACTU: ABNORMAL
BILIRUB SERPL-MCNC: 0.5 MG/DL (ref 0.2–1.3)
BILIRUB UR QL: NEGATIVE
BUN SERPL-MCNC: 13 MG/DL (ref 7–17)
BUN/CREATININE RATIO,BUCR: 16 RATIO
CALCIUM SERPL-MCNC: 9.4 MG/DL (ref 8.4–10.2)
CHLORIDE SERPL-SCNC: 100 MMOL/L (ref 98–107)
CHOL/HDL RATIO,CHHD: 3 RATIO (ref 0–4)
CHOLEST SERPL-MCNC: 168 MG/DL (ref 0–200)
CK SERPL-CCNC: 53 U/L (ref 30–135)
CLARITY: CLEAR
CO2 SERPL-SCNC: 30 MMOL/L (ref 22–32)
COLOR UR: ABNORMAL
CREAT SERPL-MCNC: 0.8 MG/DL (ref 0.7–1.2)
GLOBULIN,GLOB: 2.7
GLUCOSE 24H UR-MRATE: NEGATIVE G/(24.H)
GLUCOSE SERPL-MCNC: 94 MG/DL (ref 65–105)
HDLC SERPL-MCNC: 61 MG/DL (ref 35–130)
HGB UR QL STRIP: ABNORMAL
KETONES UR QL STRIP.AUTO: NEGATIVE
LDL/HDL RATIO,LDHD: 1 RATIO
LDLC SERPL CALC-MCNC: 82 MG/DL (ref 0–130)
LEUKOCYTE ESTERASE: NEGATIVE
NITRITE UR QL STRIP.AUTO: NEGATIVE
PH UR STRIP: 5 [PH] (ref 5–7)
POTASSIUM SERPL-SCNC: 4.2 MMOL/L (ref 3.6–5)
PROT SERPL-MCNC: 6.5 G/DL (ref 6.3–8.2)
PROT UR STRIP-MCNC: NEGATIVE MG/DL
RBC #/AREA URNS HPF: ABNORMAL #/HPF
SODIUM SERPL-SCNC: 141 MMOL/L (ref 137–145)
SP GR UR REFRACTOMETRY: 1.01 (ref 1–1.03)
TRIGL SERPL-MCNC: 125 MG/DL (ref 0–200)
UROBILINOGEN UR QL STRIP.AUTO: NEGATIVE
VLDLC SERPL CALC-MCNC: 25 MG/DL
WBC URNS QL MICRO: ABNORMAL #/HPF

## 2020-02-06 LAB
T4 FREE SERPL-MCNC: 1.24 NG/DL (ref 0.58–2.3)
TSH SERPL DL<=0.05 MIU/L-ACNC: 0.32 UIU/ML (ref 0.34–5.6)

## 2020-02-07 LAB — BACTERIA UR CULT: NORMAL

## 2020-02-27 RX ORDER — AMITRIPTYLINE HYDROCHLORIDE 25 MG/1
50 TABLET, FILM COATED ORAL
Qty: 180 TAB | Refills: 2 | Status: SHIPPED | OUTPATIENT
Start: 2020-02-27 | End: 2021-01-15

## 2020-03-10 ENCOUNTER — OFFICE VISIT (OUTPATIENT)
Dept: URGENT CARE | Age: 75
End: 2020-03-10

## 2020-03-10 VITALS
HEART RATE: 80 BPM | TEMPERATURE: 97.6 F | OXYGEN SATURATION: 100 % | WEIGHT: 162 LBS | BODY MASS INDEX: 29.81 KG/M2 | HEIGHT: 62 IN | SYSTOLIC BLOOD PRESSURE: 128 MMHG | RESPIRATION RATE: 17 BRPM | DIASTOLIC BLOOD PRESSURE: 75 MMHG

## 2020-03-10 DIAGNOSIS — J40 BRONCHITIS: Primary | ICD-10-CM

## 2020-03-10 DIAGNOSIS — R05.9 COUGH: ICD-10-CM

## 2020-03-10 RX ORDER — ALBUTEROL SULFATE 90 UG/1
2 AEROSOL, METERED RESPIRATORY (INHALATION)
Qty: 1 INHALER | Refills: 0 | Status: SHIPPED | OUTPATIENT
Start: 2020-03-10 | End: 2020-06-30

## 2020-03-10 RX ORDER — CEFDINIR 300 MG/1
300 CAPSULE ORAL 2 TIMES DAILY
Qty: 20 CAP | Refills: 0 | Status: SHIPPED | OUTPATIENT
Start: 2020-03-10 | End: 2020-03-20

## 2020-03-10 RX ORDER — BENZONATATE 200 MG/1
200 CAPSULE ORAL
Qty: 30 CAP | Refills: 0 | Status: SHIPPED | OUTPATIENT
Start: 2020-03-10 | End: 2020-06-30

## 2020-03-10 NOTE — PATIENT INSTRUCTIONS
May hold off on antibiotics, take if no improvement     Bronchitis: Care Instructions  Your Care Instructions    Bronchitis is inflammation of the bronchial tubes, which carry air to the lungs. The tubes swell and produce mucus, or phlegm. The mucus and inflamed bronchial tubes make you cough. You may have trouble breathing. Most cases of bronchitis are caused by viruses like those that cause colds. Antibiotics usually do not help and they may be harmful. Bronchitis usually develops rapidly and lasts about 2 to 3 weeks in otherwise healthy people. Follow-up care is a key part of your treatment and safety. Be sure to make and go to all appointments, and call your doctor if you are having problems. It's also a good idea to know your test results and keep a list of the medicines you take. How can you care for yourself at home? · Take all medicines exactly as prescribed. Call your doctor if you think you are having a problem with your medicine. · Get some extra rest.  · Take an over-the-counter pain medicine, such as acetaminophen (Tylenol), ibuprofen (Advil, Motrin), or naproxen (Aleve) to reduce fever and relieve body aches. Read and follow all instructions on the label. · Do not take two or more pain medicines at the same time unless the doctor told you to. Many pain medicines have acetaminophen, which is Tylenol. Too much acetaminophen (Tylenol) can be harmful. · Take an over-the-counter cough medicine that contains dextromethorphan to help quiet a dry, hacking cough so that you can sleep. Avoid cough medicines that have more than one active ingredient. Read and follow all instructions on the label. · Breathe moist air from a humidifier, hot shower, or sink filled with hot water. The heat and moisture will thin mucus so you can cough it out. · Do not smoke. Smoking can make bronchitis worse. If you need help quitting, talk to your doctor about stop-smoking programs and medicines.  These can increase your chances of quitting for good. When should you call for help? Call 911 anytime you think you may need emergency care. For example, call if:    · You have severe trouble breathing.    Call your doctor now or seek immediate medical care if:    · You have new or worse trouble breathing.     · You cough up dark brown or bloody mucus (sputum).     · You have a new or higher fever.     · You have a new rash.    Watch closely for changes in your health, and be sure to contact your doctor if:    · You cough more deeply or more often, especially if you notice more mucus or a change in the color of your mucus.     · You are not getting better as expected. Where can you learn more? Go to http://janina-luiz.info/. Enter H333 in the search box to learn more about \"Bronchitis: Care Instructions. \"  Current as of: June 9, 2019  Content Version: 12.2  © 9237-2336 [employer]. Care instructions adapted under license by [employer] (which disclaims liability or warranty for this information). If you have questions about a medical condition or this instruction, always ask your healthcare professional. [place] [address] any warranty or liability for your use of this information.

## 2020-03-10 NOTE — PROGRESS NOTES
Cough   The history is provided by the patient. This is a new problem. Episode onset: 4-5 weeks ago; went to another  given medrol gaston and doxycycline but did not take the doxy due to SEs. The problem occurs every few minutes. The problem has not changed since onset. The cough is productive of sputum. There has been no fever. Pertinent negatives include no chest pain, no chills, no sweats, no ear congestion, no ear pain, no headaches, no rhinorrhea, no sore throat, no myalgias, no shortness of breath, no wheezing, no nausea and no vomiting. She has tried steroids (medrol gaston) for the symptoms. The treatment provided no relief. She is not a smoker.         Past Medical History:   Diagnosis Date    Adult onset hypothyroidism 10/19/2017    Arthritis     feet and hands    Back pain with radiation 10/19/2017    Breast cyst 6 months ago     right breast     Disorder of bone and cartilage 10/19/2017    GERD (gastroesophageal reflux disease)     High risk medication use 10/19/2017    Hip pain, acute, left 10/19/2017    hypercholesteremia     elevatd cholesterol    Hyperlipidemia 10/19/2017    Hypothyroid     hypothyroid    Hypothyroid 10/19/2017    Irritable bowel syndrome with constipation 10/19/2017    Knee pain 10/19/2017    Menopausal disorder 10/19/2017    Migraine 10/19/2017    Migraines     Neurological disorder     migraines    On statin therapy 6/18/2019    Sjoegren syndrome     dry eyes    Sjogren's syndrome ([facility] [address] 10/19/2017    pt states she was tested for this and it was not sjogrens    Urinary frequency 10/19/2017        Past Surgical History:   Procedure Laterality Date    ABDOMEN SURGERY PROC UNLISTED  2011    removed \"sludge\" from duct in gallbladder    COLONOSCOPY Left 6/1/2018    [facility]COPY    HX BREAST BIOPSY Left 11 years ago     negative    HX GYN      hysterectomy    HX ORTHOPAEDIC  11/9/11    SPINE LUMBAR LAMINECTOMY - L4-5 LAMINECTOMY FOR REMOVAL OF EXTRA DURAL LESION ON THE LEFT - benign    HX OTHER SURGICAL      ganglion cyst removed finger    HX TONSILLECTOMY           Family History   Problem Relation Age of Onset    Heart Disease Mother         \"shrinking heart valve\"        Social History     Socioeconomic History    Marital status:      Spouse name: Not on file    Number of children: Not on file    Years of education: Not on file    Highest education level: Not on file   Occupational History    Not on file   Social Needs    Financial resource strain: Not on file    Food insecurity:     Worry: Not on file     Inability: Not on file    Transportation needs:     Medical: Not on file     Non-medical: Not on file   Tobacco Use    Smoking status: Never Smoker    Smokeless tobacco: Never Used   Substance and Sexual Activity    Alcohol use: Yes     Comment: rarely    Drug use: No    Sexual activity: Yes     Partners: Male     Birth control/protection: Surgical   Lifestyle    Physical activity:     Days per week: Not on file     Minutes per session: Not on file    Stress: Not on file   Relationships    Social connections:     Talks on phone: Not on file     Gets together: Not on file     Attends [employer] service: Not on file     Active member of club or organization: Not on file     Attends meetings of clubs or organizations: Not on file     Relationship status: Not on file    Intimate partner violence:     Fear of current or ex partner: Not on file     Emotionally abused: Not on file     Physically abused: Not on file     Forced sexual activity: Not on file   Other Topics Concern    Not on file   Social History Narrative    Not on file                ALLERGIES: Latex and Codeine    Review of Systems   Constitutional: Negative for activity change, appetite change, chills and fever. HENT: Negative for congestion, ear pain, rhinorrhea, sinus pressure, sinus pain, sore throat and trouble swallowing. Respiratory: Positive for cough. Negative for shortness of breath and wheezing. Cardiovascular: Negative for chest pain and palpitations. Gastrointestinal: Negative for nausea and vomiting. Musculoskeletal: Negative for myalgias. Neurological: Negative for dizziness and headaches. Hematological: Negative for adenopathy. Vitals:    03/10/20 1033   BP: 128/75   Pulse: 80   Resp: 17   Temp: 97.6 °F (36.4 °C)   SpO2: 100%   Weight: 162 lb (73.5 kg)   Height: 5' 2\" (1.575 m)       Physical Exam  Vitals signs and nursing note reviewed. Constitutional:       General: She is not in acute distress. Appearance: She is well-developed. She is not diaphoretic. HENT:      Right Ear: Tympanic membrane, ear canal and external ear normal.      Left Ear: Tympanic membrane, ear canal and external ear normal.      Nose: Nose normal.      Right Sinus: No maxillary sinus tenderness or frontal sinus tenderness. Left Sinus: No maxillary sinus tenderness or frontal sinus tenderness. Mouth/Throat:      Pharynx: No oropharyngeal exudate or posterior oropharyngeal erythema. Tonsils: No tonsillar abscesses. Cardiovascular:      Rate and Rhythm: Normal rate and regular rhythm. Heart sounds: Normal heart sounds. Pulmonary:      Effort: Pulmonary effort is normal. No respiratory distress. Breath sounds: Normal breath sounds. No wheezing or rales. Lymphadenopathy:      Cervical: No cervical adenopathy. Neurological:      Mental Status: She is alert. Psychiatric:         Behavior: Behavior normal.         Thought Content: Thought content normal.         Judgment: Judgment normal.         MDM    ICD-10-CM ICD-9-CM   1. Bronchitis J40 490   2.  Cough R05 786.2       Orders Placed This Encounter    XR CHEST PA LAT     Standing Status:   Future     Number of Occurrences:   1     Standing Expiration Date:   4/10/2021     Order Specific Question:   Reason for Exam     Answer:   cough x 4-5 weeks    albuterol (PROVENTIL HFA, VENTOLIN HFA, PROAIR HFA) 90 mcg/actuation inhaler     Sig: Take 2 Puffs by inhalation every four (4) hours as needed for Wheezing. Dispense:  1 Inhaler     Refill:  0    benzonatate (TESSALON) 200 mg capsule     Sig: Take 1 Cap by mouth three (3) times daily as needed for Cough. Dispense:  30 Cap     Refill:  0    cefdinir (OMNICEF) 300 mg capsule     Sig: Take 1 Cap by mouth two (2) times a day for 10 days. Dispense:  20 Cap     Refill:  0      Hold Abx, take if no improvement with tessalon and albuterol  The patient is to follow up with PCP. If signs and symptoms become worse the pt is to go to the ER. XR Results (most recent):  Results from Appointment encounter on 03/10/20   XR CHEST PA LAT    Narrative Exam:  2 view chest    Indication: Cough x4-5 weeks. COMPARISON: 11/23/2012    PA and lateral views demonstrate normal heart size. There is no acute process in  the lung fields. The osseous structures are unremarkable. Impression Impression: No acute process.  No pneumonia         Procedures

## 2020-06-16 ENCOUNTER — HOSPITAL ENCOUNTER (OUTPATIENT)
Dept: MAMMOGRAPHY | Age: 75
Discharge: HOME | End: 2020-06-16
Attending: INTERNAL MEDICINE
Payer: MEDICARE

## 2020-06-16 DIAGNOSIS — Z12.31 VISIT FOR SCREENING MAMMOGRAM: ICD-10-CM

## 2020-06-16 PROCEDURE — 77067 SCR MAMMO BI INCL CAD: CPT

## 2020-06-26 ENCOUNTER — OFFICE VISIT (OUTPATIENT)
Dept: INTERNAL MEDICINE CLINIC | Age: 75
End: 2020-06-26

## 2020-06-26 ENCOUNTER — DOCUMENTATION ONLY (OUTPATIENT)
Dept: INTERNAL MEDICINE CLINIC | Age: 75
End: 2020-06-26

## 2020-06-26 VITALS
DIASTOLIC BLOOD PRESSURE: 76 MMHG | RESPIRATION RATE: 18 BRPM | HEIGHT: 62 IN | HEART RATE: 88 BPM | TEMPERATURE: 98.4 F | BODY MASS INDEX: 30.69 KG/M2 | OXYGEN SATURATION: 95 % | SYSTOLIC BLOOD PRESSURE: 116 MMHG | WEIGHT: 166.8 LBS

## 2020-06-26 DIAGNOSIS — H25.013 CORTICAL AGE-RELATED CATARACT OF BOTH EYES: ICD-10-CM

## 2020-06-26 DIAGNOSIS — Z01.818 PREOP EXAMINATION: Primary | ICD-10-CM

## 2020-06-26 NOTE — PROGRESS NOTES
This note will not be viewable in [address] Paige Aguilar is a 76 y.o. female and presents with Pre-op Exam (cateract )  . Subjective:    Mrs. Wilman Sullivan presents today for preoperative evaluation prior to undergoing cataract surgery with Dr. Lee Goes on July 6, 2020. Nik Benson She is doing well and without significant complaint. She has noted decreased visual sharpness and glare with lights. She remains on medication for hyperlipidemia, reflux, and hypothyroid. She has no history of heart disease, diabetes, tobacco use. She denies shortness of breath, chest pain, palpitations, PND, orthopnea, or pedal edema. Review of Systems  Constitutional:   Eyes:   negative for visual disturbance and irritation  ENT:   negative for tinnitus,sore throat,nasal congestion,ear pains. hoarseness  Respiratory:  negative for cough, hemoptysis, dyspnea,wheezing  CV:   negative for chest pain, palpitations, lower extremity edema  GI:   negative for nausea, vomiting, diarrhea, abdominal pain,melena  Endo:               negative for polyuria,polydipsia,polyphagia,heat intolerance  Genitourinary: negative for frequency, dysuria and hematuria  Integumentary: negative for rash and pruritus  Hematologic:  negative for easy bruising and gum/nose bleeding  Musculoskel: negative for myalgias, arthralgias, back pain, muscle weakness, joint pain  Neurological:  negative for headaches, dizziness, vertigo, memory problems and gait   Behavl/Psych: negative for feelings of anxiety, depression, mood changes    Past Medical History:   Diagnosis Date    Adult onset hypothyroidism 10/19/2017    Arthritis     feet and hands    Back pain with radiation 10/19/2017    Breast cyst 6 months ago     right breast     Disorder of bone and cartilage 10/19/2017    GERD (gastroesophageal reflux disease)     High risk medication use 10/19/2017    Hip pain, acute, left 10/19/2017    hypercholesteremia     elevatd cholesterol    Hyperlipidemia 10/19/2017  Hypothyroid     hypothyroid    Hypothyroid 10/19/2017    Irritable bowel syndrome with constipation 10/19/2017    Knee pain 10/19/2017    Menopausal disorder 10/19/2017    Migraine 10/19/2017    Migraines     Neurological disorder     migraines    On statin therapy 6/18/2019    Sjoegren syndrome     dry eyes    Sjogren's syndrome ([facility] [address] 10/19/2017    pt states she was tested for this and it was not sjogrens    Urinary frequency 10/19/2017     Past Surgical History:   Procedure Laterality Date    ABDOMEN SURGERY PROC UNLISTED  2011    removed \"sludge\" from duct in gallbladder    COLONOSCOPY Left 6/1/2018    COLONOSCOPY performed by Jose Mayer MD at [address] HX BREAST BIOPSY Left 11 years ago     negative    HX GYN      hysterectomy    HX ORTHOPAEDIC  11/9/11    SPINE LUMBAR LAMINECTOMY - L4-5 LAMINECTOMY FOR REMOVAL OF EXTRA DURAL LESION ON THE LEFT - benign    HX OTHER SURGICAL      ganglion cyst removed finger    HX TONSILLECTOMY       Social History     Socioeconomic History    Marital status:      Spouse name: Not on file    Number of children: Not on file    Years of education: Not on file    Highest education level: Not on file   Tobacco Use    Smoking status: Never Smoker    Smokeless tobacco: Never Used   Substance and Sexual Activity    Alcohol use: Yes     Comment: rarely    Drug use: No    Sexual activity: Yes     Partners: Male     Birth control/protection: Surgical     Family History   Problem Relation Age of Onset    Heart Disease Mother         \"shrinking heart valve\"     Current Outpatient Medications   Medication Sig Dispense Refill    amitriptyline (ELAVIL) 25 mg tablet TAKE 2 TABS BY MOUTH NIGHTLY. 180 Tab 2    simvastatin (ZOCOR) 20 mg tablet TAKE 1 TABLET EVERY DAY 90 Tab 3    levothyroxine (SYNTHROID) 125 mcg tablet TAKE 1 TABLET EVERY DAY FOR THYROID 90 Tab 3    omeprazole (PRILOSEC OTC) 20 mg tablet Take 20 mg by mouth daily.       albuterol (PROVENTIL HFA, VENTOLIN HFA, PROAIR HFA) 90 mcg/actuation inhaler Take 2 Puffs by inhalation every four (4) hours as needed for Wheezing. 1 Inhaler 0    benzonatate (TESSALON) 200 mg capsule Take 1 Cap by mouth three (3) times daily as needed for Cough. 30 Cap 0     Allergies   Allergen Reactions    Latex Rash     Bandages causes a rash if left on for more than a couple of days. She buys latex free band-aids.  Codeine Nausea and Vomiting       Objective:  Visit Vitals  /76 (BP 1 Location: Left arm, BP Patient Position: Sitting)   Pulse 88   Temp 98.4 °F (36.9 °C) (Oral)   Resp 18   Ht 5' 2\" (1.575 m)   Wt 166 lb 12.8 oz (75.7 kg)   LMP  (LMP Unknown)   SpO2 95%   BMI 30.51 kg/m²     Physical Exam:   General appearance - alert, well appearing, and in no distress  Mental status - alert, oriented to person, place, and time  EYE-NIEVES, EOMI, fundi normal, corneas normal, no foreign bodies  ENT-ENT exam normal, no neck nodes or sinus tenderness  Nose - normal and patent, no erythema, discharge or polyps  Mouth - mucous membranes moist, pharynx normal without lesions  Neck - supple, no significant adenopathy   Chest - clear to auscultation, no wheezes, rales or rhonchi, symmetric air entry   Heart - normal rate, regular rhythm, normal S1, S2, no murmurs, rubs, clicks or gallops   Abdomen - soft, nontender, nondistended, no masses or organomegaly  Lymph- no adenopathy palpable  Ext-peripheral pulses normal, no pedal edema, no clubbing or cyanosis  Skin-Warm and dry. no hyperpigmentation, vitiligo, or suspicious lesions  Neuro -alert, oriented, normal speech, no focal findings or movement disorder noted  Musculoskeletal- FROM, no bony abnormalities, no point tenderness    No results found for this visit on 06/26/20. All results for lab orders may not have been returned by the time this encountered was closed. Assessment/Plan:       ICD-10-CM ICD-9-CM    1. Preop examination Z01.818 V72.84    2. 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 Plan:    Low perioperative risk. No further re-stratification indicated at this time. I have reviewed with the patient details of the assessment and plan and all questions were answered. Relevent patient education was performed. Verbal and/or written instructions (see AVS) provided. The most recent lab findings were reviewed with the patient. Plan was discussed with patient who verbal expressed understanding. An After Visit Summary was printed and given to the patient.       Tello Zhang MD

## 2020-06-26 NOTE — PROGRESS NOTES
Reviewed record in preparation for visit and have obtained necessary documentation. Identified pt with two pt identifiers(name and ). Chief Complaint   Patient presents with    Pre-op Exam     cateract         Coordination of Care Questionnaire:  :     1) Have you been to an emergency room, urgent care clinic since your last visit? Yes [facility]     Hospitalized since your last visit? No         2) Have you seen or consulted any other health care providers outside of [address] [place] [address] since your last visit?  Yes Dr Camron Buchanan

## 2020-06-30 NOTE — PERIOP NOTES
[facility]  Ambulatory Surgery Unit  Pre-operative Instructions    Surgery/Procedure Date  07/06            Tentative Arrival Time 0800      1. On the day of your surgery/procedure, please report to the Ambulatory Surgery Unit Registration Desk and sign in at your designated time. The Ambulatory Surgery Unit is located in [facility] on the [facility] side of the [facility] across from the [address] [place] [address]. Please have all of your health insurance cards and a photo ID. 2. You must have someone with you to drive you home, as you should not drive a car for 24 hours following anesthesia. Please make arrangements for a responsible adult friend or family member to stay with you for at least the first 24 hours after your surgery. 3. Do not have anything to eat or drink (including water, gum, mints, coffee, juice) after 11:59 PM  07/5. This may not apply to medications prescribed by your physician. (Please note below the special instructions with medications to take the morning of surgery, if applicable.)    4. We recommend you do not drink any alcoholic beverages for 24 hours before and after your surgery. 5. Contact your surgeons office for instructions on the following medications: non-steroidal anti-inflammatory drugs (i.e. Advil, Aleve), vitamins, and supplements. (Some surgeons will want you to stop these medications prior to surgery and others may allow you to take them)   **If you are currently taking Plavix, Coumadin, Aspirin and/or other blood-thinning agents, contact your surgeon for instructions. ** Your surgeon will partner with the physician prescribing these medications to determine if it is safe to stop or if you need to continue taking. Please do not stop taking these medications without instructions from your surgeon.     6. In an effort to help prevent surgical site infection, we ask that you shower with an anti-bacterial soap (i.e. Dial/Safeguard, or the soap provided to you at your preadmission testing appointment) for 3 days prior to and on the morning of surgery, using a fresh towel after each shower. (Please begin this process with fresh bed linens.) Do not apply any lotions, powders, or deodorants after the shower on the day of your procedure. If applicable, please do not shave the operative site for 48 hours prior to surgery. 7. Wear comfortable clothes. Wear glasses instead of contacts. Do not bring any jewelry or money (other than copays or fees as instructed). Do not wear make-up, particularly mascara, the morning of your surgery. Do not wear nail polish, particularly if you are having foot /hand surgery. Wear your hair loose or down, no ponytails, buns, bibi pins or clips. All body piercings must be removed. 8. You should understand that if you do not follow these instructions your surgery may be cancelled. If your physical condition changes (i.e. fever, cold or flu) please contact your surgeon as soon as possible. 9. It is important that you be on time. If a situation occurs where you may be late, or if you have any questions or problems, please call [phone].    10. Your surgery time may be subject to change. You will receive a phone call the day prior to surgery to confirm your arrival time. Special Instructions: Take all medications and inhalers, as prescribed, on the morning of surgery with a sip of water EXCEPT: n/a      Insulin Dependent Diabetic patients: Take your diabetic medications as prescribed the day before surgery. Hold all diabetic medications the day of surgery. If you are scheduled to arrive for surgery after 8:00 AM, and your AM blood sugar is >200, please call Ambulatory Surgery. In an effort to improve the efficiency, privacy, and safety for all of our Pre-op patients visitors are not allowed in the Holding area.  Once you arrive and are registered your family/visitors will be asked to remain in your vehicle. The Pre-op staff will call you when they are ready for you to enter the building and will explain to you and your family/visitors that the Pre-op phase is beginning. At this time, if your procedure is outpatient, your family member will be asked to stay in their vehicle until the surgery is complete and you are ready for discharge. If you are going to be admitted after your surgery, once you are called to come inside the building, your family will be able to leave the parking lot. At this time the staff will also ask for your designated spokesperson information in the event that the physician or staff need to provide an update or obtain any pertinent information. The designated spokesperson will be notified if the physician needs to speak to family during the pre-operative phase.and/or in the post op phase. I understand a pre-operative phone call will be made to verify my surgery time. In the event that I am not available, I give permission for a message to be left on my answering service and/or with another person?       yes         ___________________      ___________________      ________________  (Signature of Patient)          (Witness)                   (Date and Time)

## 2020-07-02 ENCOUNTER — HOSPITAL ENCOUNTER (OUTPATIENT)
Dept: PREADMISSION TESTING | Age: 75
Discharge: HOME | End: 2020-07-02
Attending: OPHTHALMOLOGY
Payer: MEDICARE

## 2020-07-02 ENCOUNTER — ANESTHESIA EVENT (OUTPATIENT)
Dept: SURGERY | Age: 75
End: 2020-07-02
Payer: MEDICARE

## 2020-07-02 PROCEDURE — 87635 SARS-COV-2 COVID-19 AMP PRB: CPT

## 2020-07-03 LAB
SARS-COV-2, COV2NT: NOT DETECTED
SOURCE, COVRS: NORMAL
SPECIMEN SOURCE, FCOV2M: NORMAL

## 2020-07-06 ENCOUNTER — ANESTHESIA (OUTPATIENT)
Dept: SURGERY | Age: 75
End: 2020-07-06
Payer: MEDICARE

## 2020-07-06 ENCOUNTER — HOSPITAL ENCOUNTER (OUTPATIENT)
Age: 75
Setting detail: OUTPATIENT SURGERY
Discharge: HOME | End: 2020-07-06
Attending: OPHTHALMOLOGY | Admitting: OPHTHALMOLOGY
Payer: MEDICARE

## 2020-07-06 VITALS
TEMPERATURE: 97.4 F | DIASTOLIC BLOOD PRESSURE: 82 MMHG | BODY MASS INDEX: 30.73 KG/M2 | RESPIRATION RATE: 20 BRPM | WEIGHT: 167 LBS | OXYGEN SATURATION: 96 % | SYSTOLIC BLOOD PRESSURE: 176 MMHG | HEIGHT: 62 IN | HEART RATE: 76 BPM

## 2020-07-06 PROCEDURE — 76210000040 HC AMBSU PH I REC FIRST 0.5 HR: Performed by: OPHTHALMOLOGY

## 2020-07-06 PROCEDURE — 77030021352 HC CBL LD SYS DISP COVD -B: Performed by: OPHTHALMOLOGY

## 2020-07-06 PROCEDURE — 76060000073 HC AMB SURG ANES FIRST 0.5 HR: Performed by: OPHTHALMOLOGY

## 2020-07-06 PROCEDURE — 74011250636 HC RX REV CODE- 250/636

## 2020-07-06 PROCEDURE — 74011000250 HC RX REV CODE- 250: Performed by: OPHTHALMOLOGY

## 2020-07-06 PROCEDURE — V2632 POST CHMBR INTRAOCULAR LENS: HCPCS | Performed by: OPHTHALMOLOGY

## 2020-07-06 PROCEDURE — 77030018846 HC SOL IRR STRL H20 ICUM -A: Performed by: OPHTHALMOLOGY

## 2020-07-06 PROCEDURE — 74011250636 HC RX REV CODE- 250/636: Performed by: OPHTHALMOLOGY

## 2020-07-06 PROCEDURE — 74011250637 HC RX REV CODE- 250/637: Performed by: OPHTHALMOLOGY

## 2020-07-06 PROCEDURE — 76210000046 HC AMBSU PH II REC FIRST 0.5 HR: Performed by: OPHTHALMOLOGY

## 2020-07-06 PROCEDURE — 76030000002 HC AMB SURG OR TIME FIRST 0.: Performed by: OPHTHALMOLOGY

## 2020-07-06 DEVICE — ACRYSOF(R) IQ ASPHERIC NATURAL IOL, SINGLE-PIECE ACRYLIC FOLDABLE PCL, UV WITH BLUE LIGHTFILTER, 13.0MM LENGTH, 6.0MM ANTERIORASYMMETRIC BICONVEX OPTIC, PLANAR HAPTICS.
Type: IMPLANTABLE DEVICE | Site: EYE | Status: FUNCTIONAL
Brand: ACRYSOF®

## 2020-07-06 RX ORDER — SODIUM CHLORIDE 0.9 % (FLUSH) 0.9 %
5-40 SYRINGE (ML) INJECTION AS NEEDED
Status: DISCONTINUED | OUTPATIENT
Start: 2020-07-06 | End: 2020-07-06 | Stop reason: HOSPADM

## 2020-07-06 RX ORDER — TIMOLOL MALEATE 5 MG/ML
SOLUTION/ DROPS OPHTHALMIC AS NEEDED
Status: DISCONTINUED | OUTPATIENT
Start: 2020-07-06 | End: 2020-07-06 | Stop reason: HOSPADM

## 2020-07-06 RX ORDER — SODIUM CHLORIDE 0.9 % (FLUSH) 0.9 %
5-40 SYRINGE (ML) INJECTION EVERY 8 HOURS
Status: DISCONTINUED | OUTPATIENT
Start: 2020-07-06 | End: 2020-07-06 | Stop reason: HOSPADM

## 2020-07-06 RX ORDER — SODIUM CHLORIDE, SODIUM LACTATE, POTASSIUM CHLORIDE, CALCIUM CHLORIDE 600; 310; 30; 20 MG/100ML; MG/100ML; MG/100ML; MG/100ML
25 INJECTION, SOLUTION INTRAVENOUS CONTINUOUS
Status: DISCONTINUED | OUTPATIENT
Start: 2020-07-06 | End: 2020-07-06 | Stop reason: HOSPADM

## 2020-07-06 RX ORDER — ONDANSETRON 2 MG/ML
INJECTION INTRAMUSCULAR; INTRAVENOUS AS NEEDED
Status: DISCONTINUED | OUTPATIENT
Start: 2020-07-06 | End: 2020-07-06 | Stop reason: HOSPADM

## 2020-07-06 RX ORDER — NEOMYCIN SULFATE, POLYMYXIN B SULFATE, AND DEXAMETHASONE 3.5; 10000; 1 MG/G; [USP'U]/G; MG/G
OINTMENT OPHTHALMIC AS NEEDED
Status: DISCONTINUED | OUTPATIENT
Start: 2020-07-06 | End: 2020-07-06 | Stop reason: HOSPADM

## 2020-07-06 RX ORDER — SODIUM CHLORIDE 9 MG/ML
25 INJECTION, SOLUTION INTRAVENOUS CONTINUOUS
Status: DISCONTINUED | OUTPATIENT
Start: 2020-07-06 | End: 2020-07-06 | Stop reason: HOSPADM

## 2020-07-06 RX ORDER — MIDAZOLAM HYDROCHLORIDE 1 MG/ML
INJECTION, SOLUTION INTRAMUSCULAR; INTRAVENOUS AS NEEDED
Status: DISCONTINUED | OUTPATIENT
Start: 2020-07-06 | End: 2020-07-06 | Stop reason: HOSPADM

## 2020-07-06 RX ORDER — DIPHENHYDRAMINE HYDROCHLORIDE 50 MG/ML
12.5 INJECTION, SOLUTION INTRAMUSCULAR; INTRAVENOUS AS NEEDED
Status: DISCONTINUED | OUTPATIENT
Start: 2020-07-06 | End: 2020-07-06 | Stop reason: HOSPADM

## 2020-07-06 RX ORDER — TROPICAMIDE 10 MG/ML
1 SOLUTION/ DROPS OPHTHALMIC
Status: COMPLETED | OUTPATIENT
Start: 2020-07-06 | End: 2020-07-06

## 2020-07-06 RX ORDER — LIDOCAINE HYDROCHLORIDE 10 MG/ML
0.1 INJECTION, SOLUTION EPIDURAL; INFILTRATION; INTRACAUDAL; PERINEURAL AS NEEDED
Status: DISCONTINUED | OUTPATIENT
Start: 2020-07-06 | End: 2020-07-06 | Stop reason: HOSPADM

## 2020-07-06 RX ORDER — CIPROFLOXACIN HYDROCHLORIDE 3.5 MG/ML
SOLUTION/ DROPS TOPICAL
Status: DISPENSED
Start: 2020-07-06 | End: 2020-07-06

## 2020-07-06 RX ORDER — ONDANSETRON 2 MG/ML
4 INJECTION INTRAMUSCULAR; INTRAVENOUS AS NEEDED
Status: DISCONTINUED | OUTPATIENT
Start: 2020-07-06 | End: 2020-07-06 | Stop reason: HOSPADM

## 2020-07-06 RX ORDER — CIPROFLOXACIN HYDROCHLORIDE 3.5 MG/ML
1 SOLUTION/ DROPS TOPICAL
Status: COMPLETED | OUTPATIENT
Start: 2020-07-06 | End: 2020-07-06

## 2020-07-06 RX ORDER — TETRACAINE HYDROCHLORIDE 5 MG/ML
SOLUTION OPHTHALMIC AS NEEDED
Status: DISCONTINUED | OUTPATIENT
Start: 2020-07-06 | End: 2020-07-06 | Stop reason: HOSPADM

## 2020-07-06 RX ORDER — TROPICAMIDE 10 MG/ML
SOLUTION/ DROPS OPHTHALMIC
Status: DISPENSED
Start: 2020-07-06 | End: 2020-07-06

## 2020-07-06 RX ORDER — FENTANYL CITRATE 50 UG/ML
25 INJECTION, SOLUTION INTRAMUSCULAR; INTRAVENOUS
Status: DISCONTINUED | OUTPATIENT
Start: 2020-07-06 | End: 2020-07-06 | Stop reason: HOSPADM

## 2020-07-06 RX ADMIN — TROPICAMIDE 1 DROP: 10 SOLUTION/ DROPS OPHTHALMIC at 09:12

## 2020-07-06 RX ADMIN — CIPROFLOXACIN 1 DROP: 3 SOLUTION OPHTHALMIC at 09:15

## 2020-07-06 RX ADMIN — TROPICAMIDE 1 DROP: 10 SOLUTION/ DROPS OPHTHALMIC at 09:15

## 2020-07-06 RX ADMIN — MIDAZOLAM HYDROCHLORIDE 1 MG: 1 INJECTION, SOLUTION INTRAMUSCULAR; INTRAVENOUS at 09:53

## 2020-07-06 RX ADMIN — Medication 3 AMPULE: at 09:12

## 2020-07-06 RX ADMIN — SODIUM CHLORIDE 25 ML/HR: 900 INJECTION, SOLUTION INTRAVENOUS at 09:09

## 2020-07-06 RX ADMIN — CIPROFLOXACIN 1 DROP: 3 SOLUTION OPHTHALMIC at 09:12

## 2020-07-06 RX ADMIN — ONDANSETRON HYDROCHLORIDE 4 MG: 2 INJECTION, SOLUTION INTRAMUSCULAR; INTRAVENOUS at 09:55

## 2020-07-06 RX ADMIN — TROPICAMIDE 1 DROP: 10 SOLUTION/ DROPS OPHTHALMIC at 09:19

## 2020-07-06 RX ADMIN — MIDAZOLAM HYDROCHLORIDE 1 MG: 1 INJECTION, SOLUTION INTRAMUSCULAR; INTRAVENOUS at 09:49

## 2020-07-06 RX ADMIN — CIPROFLOXACIN 1 DROP: 3 SOLUTION OPHTHALMIC at 09:19

## 2020-07-06 NOTE — BRIEF OP NOTE
Brief Postoperative Note    Patient: Kiah Reyes  YOB: 1945  MRN: [id number]    Date of Procedure: 7/6/2020     Pre-Op Diagnosis: Nuclear Sclerotic cataract right eye H25.11    Post-Op Diagnosis: Nuclear Sclerotic cataract right eye H25.11    Procedure(s):  RIGHT CATARACT EXTRACTION WITH INTRA OCULAR LENS IMPLANT(LATEX ALLERGY)    Surgeon(s):  Darrius Clemons MD    Surgical Assistant: None    Anesthesia: MAC     Estimated Blood Loss (mL): none    Complications: none    Specimens: * No specimens in log *     Implants:   Implant Name Type Inv.  Item Serial No.  Lot No. LRB No. Used Action   IOL ASPHERIC 6.0 OZZIE +15.0 - [id number]  IOL ASPHERIC 6.0 OZZIE +15.0 [id number] [employer]  Right 1 Implanted       Drains: * No LDAs found *    Findings: cataract right eye    Electronically Signed by Leonardo Wahl MD on 7/6/2020 at 10:33 AM

## 2020-07-06 NOTE — DISCHARGE INSTRUCTIONS
Allison Fan MD  [facility] [address]  [place], [facility]  Phone: [phone]       Fax: [phone]  If you are unable to keep appointment, kindly give 24 hours notice please. REMOVE PATCH  START DROPS WHEN YOU GET HOME  PUT PATCH BACK ON AT BEDTIME    1. DO NOT RUB the eye that was operated on. 2. Do not strain excessively. It is all right to bend as long as you do not strain. 3. It is safe to take a shower, wash your face, and wash your hair. Just keep the eye closed. 4. Do not swim for 1 week after surgery. 5. If you have any problems or questions, do not hesitate to call. There is always a physician on call at [phone] ext. [phone].   6. Follow instructions on eye drops from office. 7. You may take Tylenol or Advil for discomfort. If it pressure not relieved by Tylenol or Advil, please call Dr. Hogan Begun office. TO PREVENT AN INFECTION      1. [address] YOUR HANDS     To prevent infection, good handwashing is the most important thing you or your caregiver can do.  Wash your hands with soap and water or use the hand  we gave you before you touch any wounds. 2.  USE CLEAN SHEETS     Use freshly cleaned sheets on your bed after surgery.  To keep the surgery site clean, do not allow pets to sleep with you while your wound is still healing. 3. STOP SMOKING    Stop smoking, or at least cut back on smoking     Smoking slows your healing. 4.  CONTROL YOUR BLOOD SUGAR     High blood sugars slow wound healing.  If you are diabetic, control your blood sugar levels before and after your surgery. If you were given prescriptions, please review the written information on the prescribed medications. DO NOT DRIVE WHILE TAKING NARCOTIC PAIN MEDICATIONS.     DISCHARGE SUMMARY from Nurse    The following personal items collected during your admission are returned to you:   Dental Appliance: Dental Appliances: None  Vision: Visual Aid: Glasses  Hearing Aid:    Jewelry:    Clothing:    Other Valuables:    Valuables sent to safe:      PATIENT INSTRUCTIONS:    After general anesthesia or intravenous sedation, for 24 hours or while taking prescription Narcotics:  · Someone should be with you for the next 24 hours. · For your own safety, a responsible adult must drive you home. · Limit your activities  · Recommended activity: Rest today, Do not climb stairs or shower unattended for the next 24 hours. · Do not drive and operate hazardous machinery  · Do not make important personal or business decisions  · Do  not drink alcoholic beverages  · If you have not urinated within 8 hours after discharge, please contact your surgeon on call. Report the following to your surgeon:  · Excessive pain, swelling, redness or odor of or around the surgical area  · Temperature over 100.5  · Nausea and vomiting lasting longer than 4 hours or if unable to take medications    · You will receive a Post Operative Call from one of the Recovery Room Nurses on the day after your surgery to check on you. It is very important for us to know how you are recovering after your surgery. · You may receive an e-mail or letter in the mail from [employer] regarding your experience with us in the Ambulatory Surgery Unit. Your feedback is valuable to us and we appreciate your participation in the survey. · We wish youre a speedy recovery ? What to do at Home:      *  Please give a list of your current medications to your Primary Care Provider. *  Please update this list whenever your medications are discontinued, doses are      changed, or new medications (including over-the-counter products) are added. *  Please carry medication information at all times in case of emergency situations.           These are general instructions for a healthy lifestyle:    No smoking/ No tobacco products/ Avoid exposure to second hand smoke    Surgeon General's Warning:  Quitting smoking now greatly reduces serious risk to your health. Obesity, smoking, and sedentary lifestyle greatly increases your risk for illness    A healthy diet, regular physical exercise & weight monitoring are important for maintaining a healthy lifestyle    You may be retaining fluid if you have a history of heart failure or if you experience any of the following symptoms:  Weight gain of 3 pounds or more overnight or 5 pounds in a week, increased swelling in our hands or feet or shortness of breath while lying flat in bed. Please call your doctor as soon as you notice any of these symptoms; do not wait until your next office visit. Recognize signs and symptoms of STROKE:    B - Balance  E - Eyes    F-face looks uneven    A-arms unable to move or move even    S-speech slurred or non-existent    T-time-call 911 as soon as signs and symptoms begin-DO NOT go       Back to bed or wait to see if you get better-TIME IS BRAIN. If you have not received your influenza and/or pneumococcal vaccine, please follow up with your primary care physician. The discharge information has been reviewed with the patient and caregiver. The patient and caregiver verbalized understanding. Patient Education        Learning About Coronavirus [phone])  Coronavirus [phone]): Overview  What is coronavirus (EUCUM-56)? The coronavirus disease (COVID-19) is caused by a virus. It is an illness that was first found in [place], [place], in December 2019. It has since spread worldwide. The virus can cause fever, cough, and trouble breathing. In severe cases, it can cause pneumonia and make it hard to breathe without help. It can cause death. Coronaviruses are a large group of viruses. They cause the common cold. They also cause more serious illnesses like Middle East respiratory syndrome (MERS) and severe acute respiratory syndrome (SARS). COVID-19 is caused by a novel coronavirus.  That means it's a new type that has not been seen in people before. This virus spreads person-to-person through droplets from coughing and sneezing. It can also spread when you are close to someone who is infected. And it can spread when you touch something that has the virus on it, such as a doorknob or a tabletop. What can you do to protect yourself from coronavirus (COVID-19)? The best way to protect yourself from getting sick is to:  · Avoid areas where there is an outbreak. · Avoid contact with people who may be infected. · Wash your hands often with soap or alcohol-based hand sanitizers. · Avoid crowds and try to stay at least 6 feet away from other people. · Wash your hands often, especially after you cough or sneeze. Use soap and water, and scrub for at least 20 seconds. If soap and water aren't available, use an alcohol-based hand . · Avoid touching your mouth, nose, and eyes. What can you do to avoid spreading the virus to others? To help avoid spreading the virus to others:  · Cover your mouth with a tissue when you cough or sneeze. Then throw the tissue in the trash. · Use a disinfectant to clean things that you touch often. · Wear a cloth face cover if you have to go to public areas. · Stay home if you are sick or have been exposed to the virus. Don't go to school, work, or public areas. And don't use public transportation, ride-shares, or taxis unless you have no choice. · If you are sick:  ? Leave your home only if you need to get medical care. But call the doctor's office first so they know you're coming. And wear a face cover. ? Wear the face cover whenever you're around other people. It can help stop the spread of the virus when you cough or sneeze. ? Clean and disinfect your home every day. Use household  and disinfectant wipes or sprays. Take special care to clean things that you grab with your hands. These include doorknobs, remote controls, phones, and handles on your refrigerator and microwave.  And don't forget countertops, tabletops, bathrooms, and computer keyboards. When to call for help  [id number] anytime you think you may need emergency care. For example, call if:  · You have severe trouble breathing. (You can't talk at all.)  · You have constant chest pain or pressure. · You are severely dizzy or lightheaded. · You are confused or can't think clearly. · Your face and lips have a blue color. · You pass out (lose consciousness) or are very hard to wake up. Call your doctor now if you develop symptoms such as:  · Shortness of breath. · Fever. · Cough. If you need to get care, call ahead to the doctor's office for instructions before you go. Make sure you wear a face cover to prevent exposing other people to the virus. Where can you get the latest information? The following health organizations are tracking and studying this virus. Their websites contain the most up-to-date information. Nuvia Stephenselisa also learn what to do if you think you may have been exposed to the virus. · U.S. Centers for Disease Control and Prevention (CDC): The CDC provides updated news about the disease and travel advice. The website also tells you how to prevent the spread of infection. www.cdc.gov  · World Health Organization [facility]): WHO offers information about the virus outbreaks. WHO also has travel advice. www.who.int  Current as of: May 8, 2020               Content Version: 12.5  © 4987-6917 Healthwise, Incorporated. Care instructions adapted under license by [employer] (which disclaims liability or warranty for this information). If you have questions about a medical condition or this instruction, always ask your healthcare professional. [place] [address] any warranty or liability for your use of this information.

## 2020-07-06 NOTE — ANESTHESIA PREPROCEDURE EVALUATION
Anesthetic History   No history of anesthetic complications            Review of Systems / Medical History  Patient summary reviewed, nursing notes reviewed and pertinent labs reviewed    Pulmonary  Within defined limits                 Neuro/Psych         Headaches (migraines)     Cardiovascular              Hyperlipidemia    Exercise tolerance: >4 METS     GI/Hepatic/Renal  Within defined limits   GERD: well controlled           Endo/Other  Within defined limits    Hypothyroidism: well controlled  Arthritis     Other Findings              Physical Exam    Airway  Mallampati: II  TM Distance: > 6 cm  Neck ROM: normal range of motion   Mouth opening: Normal     Cardiovascular    Rhythm: regular  Rate: normal         Dental  No notable dental hx       Pulmonary  Breath sounds clear to auscultation               Abdominal  GI exam deferred       Other Findings            Anesthetic Plan    ASA: 2  Anesthesia type: MAC          Induction: Intravenous  Anesthetic plan and risks discussed with: Patient

## 2020-07-06 NOTE — PERIOP NOTES
Marlene Brandon  1945  [id number]    Situation:  Verbal report given from: VIKTORIA Castillo RN and Dyllan San  Procedure: Procedure(s):  RIGHT CATARACT EXTRACTION WITH INTRA OCULAR LENS IMPLANT(LATEX ALLERGY)    Background:    Preoperative diagnosis: CATARACT RIGHT EYE    Postoperative diagnosis: CATARACT RIGHT EYE    :  Dr. Emy Shaw    Assistant(s): Circ-1: Ajay Penn  Scrub Tech-1: Brianne IRAHETA    Specimens: * No specimens in log *    Assessment:  Intra-procedure medications         Anesthesia gave intra-procedure sedation and medications, see anesthesia flow sheet     Intravenous fluids: NS@ Trenda Arts     Vital signs stable       Recommendation:    Permission to share finding with daughter Koffi Fore : yes

## 2020-07-06 NOTE — OP NOTES
Date of Procedure: 7/6/2020  Preoperative Diagnosis: Nuclear Sclerotic cataract right eye H25.11  Postoperative Diagnosis: Nuclear Sclerotic cataract right eye H25.11  Procedure: Extracapsular cataract extraction with lens implant right eye  Surgeon:  FELIPE Roberts MD  Assistants: None  Anesthesia: MAC with local  Estimated Blood Loss: None  Findings: Cataract right eye  Complications: None  Specimens: None  Prosthetic Devices: Intraocular lens implant     The patient's right eye was dilated with mydriacyl 1% and ciprofloxacin 0.3% for 3 doses preoperatively. The patient was taken to the operating room and was given sedation. Tetracaine was given topically to the right eye, and the eye was prepped and draped in the usual manner for sterile eye surgery, including Betadine solution being dropped onto the conjunctiva at the beginning of the prep. The eyelashes were isolated with a plastic drape. A lid speculum was placed.     A #15 blade was used to make a paracentesis at the 10:00 location. The eye was flushed with a lidocaine / epinephrine mixture (\"Shugarcaine\"). The eye was filled with Viscoat (Duovisc), and a crescent blade was used to make a 2.5 mm incision at the limbus temporally. This was dissected 2 mm into clear cornea, and the eye was entered with a 2.4 mm keratome. A 0.12 forceps was used for fixation during the procedure. A capsulorhexis flap was started with a cystotome, and this was completed 360 degrees with Utrata forceps. The capsular piece was removed. [place] dissection was performed with the \"Shugarcaine\" mixture on a cannula.     The lens nucleus was removed using phacoemulsification with a total phaco time of 1:32 minutes at 12.5%.     The lens was cracked and manipulated with a Sinsky hook. Residual cortex was removed using irrigation / aspiration.   The capsule remained intact.     The capsule was refilled with Provisc (Duovisc), and an Lonny Intraocular lens model SN60WF power 15.0 was placed in a lens folding cartridge with Provisc.     The lens was unfolded into the capsular bag. The lens centered well. Residual Provisc and Viscoat were removed using I / A. No suture was required to close the incision. The eye was flushed with BSS through the paracentesis. Betadine solution was placed on the conjunctival surface at the end of the case. The eye was left soft and formed at the end of the case. The incision site was water tight. The speculum was removed, and a drop of timolol 0.5% and neopolydex ointment was placed on the eye followed by a shield. The patient tolerated the procedure well and is to follow-up in one day.

## 2020-07-06 NOTE — ANESTHESIA POSTPROCEDURE EVALUATION
Procedure(s):  RIGHT CATARACT EXTRACTION WITH INTRA OCULAR LENS IMPLANT(LATEX ALLERGY).     MAC    Anesthesia Post Evaluation      Multimodal analgesia: multimodal analgesia not used between 6 hours prior to anesthesia start to PACU discharge  Patient location during evaluation: PACU  Patient participation: complete - patient participated  Level of consciousness: awake and alert  Pain score: 0  Airway patency: patent  Anesthetic complications: no  Cardiovascular status: acceptable  Respiratory status: acceptable  Hydration status: acceptable  Post anesthesia nausea and vomiting:  none  Final Post Anesthesia Temperature Assessment:  Normothermia (36.0-37.5 degrees C)      INITIAL Post-op Vital signs:   Vitals Value Taken Time   /94 7/6/2020 10:29 AM   Temp 36.3 °C (97.4 °F) 7/6/2020 10:21 AM   Pulse 82 7/6/2020 10:29 AM   Resp 21 7/6/2020 10:29 AM   SpO2 97 % 7/6/2020 10:29 AM

## 2020-07-06 NOTE — PERIOP NOTES
Pt awake/alert, sipping ginger ale.  to bedside to speak with pt.  1050 D/C instructions reviewed with daughter via phone Encouraged pt/dtr to monitor BP at home and record. If continues to be elevated, see family MD  80 Discharged to home via/wc,accompanied to car per RN. Skin warm and dry, awake and alert. Respirations even, unlabored. Pt and family members questions and concerns addressed prior to discharge. All belongings (glasses0 with pt.

## 2020-07-07 ENCOUNTER — TELEPHONE (OUTPATIENT)
Dept: INTERNAL MEDICINE CLINIC | Age: 75
End: 2020-07-07

## 2020-07-07 NOTE — TELEPHONE ENCOUNTER
Patient states she went to have cataract surgery yesterday    States her blood pressure normally runs low but its running around 130 over 80      [phone]

## 2020-07-08 NOTE — PERIOP NOTES
[facility]  Ambulatory Surgery Unit  Pre-operative Instructions    Surgery/Procedure Date  Monday, July 13, 2020            Tentative Arrival Time 1045      1. On the day of your surgery/procedure, please report to the Ambulatory Surgery Unit Registration Desk and sign in at your designated time. The Ambulatory Surgery Unit is located in [facility] on the [facility] side of the [facility] across from the [address] [place] [address]. Please have all of your health insurance cards and a photo ID. 2. You must have someone with you to drive you home, as you should not drive a car for 24 hours following anesthesia. Please make arrangements for a responsible adult friend or family member to stay with you for at least the first 24 hours after your surgery. 3. Do not have anything to eat or drink (including water, gum, mints, coffee, juice) after 11:59 PM, Sunday. This may not apply to medications prescribed by your physician. (Please note below the special instructions with medications to take the morning of surgery, if applicable.)    4. We recommend you do not drink any alcoholic beverages for 24 hours before and after your surgery. 5. Contact your surgeons office for instructions on the following medications: non-steroidal anti-inflammatory drugs (i.e. Advil, Aleve), vitamins, and supplements. (Some surgeons will want you to stop these medications prior to surgery and others may allow you to take them)   **If you are currently taking Plavix, Coumadin, Aspirin and/or other blood-thinning agents, contact your surgeon for instructions. ** Your surgeon will partner with the physician prescribing these medications to determine if it is safe to stop or if you need to continue taking. Please do not stop taking these medications without instructions from your surgeon.     6. In an effort to help prevent surgical site infection, we ask that you shower with an anti-bacterial soap (i.e. Dial/Safeguard, or the soap provided to you at your preadmission testing appointment) for 3 days prior to and on the morning of surgery, using a fresh towel after each shower. (Please begin this process with fresh bed linens.) Do not apply any lotions, powders, or deodorants after the shower on the day of your procedure. If applicable, please do not shave the operative site for 48 hours prior to surgery. 7. Wear comfortable clothes. Wear glasses instead of contacts. Do not bring any jewelry or money (other than copays or fees as instructed). Do not wear make-up, particularly mascara, the morning of your surgery. Do not wear nail polish, particularly if you are having foot /hand surgery. Wear your hair loose or down, no ponytails, buns, bibi pins or clips. All body piercings must be removed. 8. You should understand that if you do not follow these instructions your surgery may be cancelled. If your physical condition changes (i.e. fever, cold or flu) please contact your surgeon as soon as possible. 9. It is important that you be on time. If a situation occurs where you may be late, or if you have any questions or problems, please call [phone].    10. Your surgery time may be subject to change. You will receive a phone call the day prior to surgery to confirm your arrival time. 11. Pediatric patients: please bring a change of clothes, diapers, bottle/sippy cup, pacifier, etc.      Special Instructions: Take all medications and inhalers, as prescribed, on the morning of surgery with a sip of water. I understand a pre-operative phone call will be made to verify my surgery time. In the event that I am not available, I give permission for a message to be left on my answering service and/or with another person?       yes    Preop instructions reviewed  Pt verbalized understanding.      ___________________      ___________________      ________________  (Signature of Patient)          (Witness) (Date and Time)

## 2020-07-09 ENCOUNTER — HOSPITAL ENCOUNTER (OUTPATIENT)
Dept: PREADMISSION TESTING | Age: 75
Discharge: HOME | End: 2020-07-09
Payer: MEDICARE

## 2020-07-09 PROCEDURE — 87635 SARS-COV-2 COVID-19 AMP PRB: CPT

## 2020-07-10 ENCOUNTER — ANESTHESIA EVENT (OUTPATIENT)
Dept: SURGERY | Age: 75
End: 2020-07-10
Payer: MEDICARE

## 2020-07-11 LAB
SARS-COV-2, COV2NT: NOT DETECTED
SOURCE, COVRS: NORMAL
SPECIMEN SOURCE, FCOV2M: NORMAL

## 2020-07-13 ENCOUNTER — HOSPITAL ENCOUNTER (OUTPATIENT)
Age: 75
Setting detail: OUTPATIENT SURGERY
Discharge: HOME | End: 2020-07-13
Attending: OPHTHALMOLOGY | Admitting: OPHTHALMOLOGY
Payer: MEDICARE

## 2020-07-13 ENCOUNTER — ANESTHESIA (OUTPATIENT)
Dept: SURGERY | Age: 75
End: 2020-07-13
Payer: MEDICARE

## 2020-07-13 VITALS
OXYGEN SATURATION: 99 % | BODY MASS INDEX: 30.55 KG/M2 | TEMPERATURE: 97.6 F | HEART RATE: 74 BPM | RESPIRATION RATE: 15 BRPM | WEIGHT: 166 LBS | DIASTOLIC BLOOD PRESSURE: 82 MMHG | HEIGHT: 62 IN | SYSTOLIC BLOOD PRESSURE: 144 MMHG

## 2020-07-13 PROCEDURE — 74011250636 HC RX REV CODE- 250/636: Performed by: NURSE ANESTHETIST, CERTIFIED REGISTERED

## 2020-07-13 PROCEDURE — 77030021352 HC CBL LD SYS DISP COVD -B: Performed by: OPHTHALMOLOGY

## 2020-07-13 PROCEDURE — 77030018846 HC SOL IRR STRL H20 ICUM -A: Performed by: OPHTHALMOLOGY

## 2020-07-13 PROCEDURE — 74011250636 HC RX REV CODE- 250/636: Performed by: OPHTHALMOLOGY

## 2020-07-13 PROCEDURE — 74011250637 HC RX REV CODE- 250/637: Performed by: OPHTHALMOLOGY

## 2020-07-13 PROCEDURE — 76030000002 HC AMB SURG OR TIME FIRST 0.: Performed by: OPHTHALMOLOGY

## 2020-07-13 PROCEDURE — 76210000040 HC AMBSU PH I REC FIRST 0.5 HR: Performed by: OPHTHALMOLOGY

## 2020-07-13 PROCEDURE — V2632 POST CHMBR INTRAOCULAR LENS: HCPCS | Performed by: OPHTHALMOLOGY

## 2020-07-13 PROCEDURE — 76210000046 HC AMBSU PH II REC FIRST 0.5 HR: Performed by: OPHTHALMOLOGY

## 2020-07-13 PROCEDURE — 74011000250 HC RX REV CODE- 250

## 2020-07-13 PROCEDURE — 74011000250 HC RX REV CODE- 250: Performed by: OPHTHALMOLOGY

## 2020-07-13 PROCEDURE — 76060000073 HC AMB SURG ANES FIRST 0.5 HR: Performed by: OPHTHALMOLOGY

## 2020-07-13 DEVICE — ACRYSOF(R) IQ ASPHERIC NATURAL IOL, SINGLE-PIECE ACRYLIC FOLDABLE PCL, UV WITH BLUE LIGHTFILTER, 13.0MM LENGTH, 6.0MM ANTERIORASYMMETRIC BICONVEX OPTIC, PLANAR HAPTICS.
Type: IMPLANTABLE DEVICE | Site: EYE | Status: FUNCTIONAL
Brand: ACRYSOF®

## 2020-07-13 RX ORDER — SODIUM CHLORIDE 0.9 % (FLUSH) 0.9 %
5-40 SYRINGE (ML) INJECTION AS NEEDED
Status: DISCONTINUED | OUTPATIENT
Start: 2020-07-13 | End: 2020-07-14 | Stop reason: HOSPADM

## 2020-07-13 RX ORDER — CIPROFLOXACIN HYDROCHLORIDE 3.5 MG/ML
1 SOLUTION/ DROPS TOPICAL
Status: COMPLETED | OUTPATIENT
Start: 2020-07-13 | End: 2020-07-13

## 2020-07-13 RX ORDER — TROPICAMIDE 10 MG/ML
SOLUTION/ DROPS OPHTHALMIC
Status: COMPLETED
Start: 2020-07-13 | End: 2020-07-13

## 2020-07-13 RX ORDER — FENTANYL CITRATE 50 UG/ML
25 INJECTION, SOLUTION INTRAMUSCULAR; INTRAVENOUS
Status: DISCONTINUED | OUTPATIENT
Start: 2020-07-13 | End: 2020-07-14 | Stop reason: HOSPADM

## 2020-07-13 RX ORDER — SODIUM CHLORIDE 0.9 % (FLUSH) 0.9 %
5-40 SYRINGE (ML) INJECTION EVERY 8 HOURS
Status: DISCONTINUED | OUTPATIENT
Start: 2020-07-13 | End: 2020-07-14 | Stop reason: HOSPADM

## 2020-07-13 RX ORDER — TIMOLOL MALEATE 5 MG/ML
SOLUTION/ DROPS OPHTHALMIC AS NEEDED
Status: DISCONTINUED | OUTPATIENT
Start: 2020-07-13 | End: 2020-07-13 | Stop reason: HOSPADM

## 2020-07-13 RX ORDER — MIDAZOLAM HYDROCHLORIDE 1 MG/ML
INJECTION, SOLUTION INTRAMUSCULAR; INTRAVENOUS AS NEEDED
Status: DISCONTINUED | OUTPATIENT
Start: 2020-07-13 | End: 2020-07-13 | Stop reason: HOSPADM

## 2020-07-13 RX ORDER — TROPICAMIDE 10 MG/ML
1 SOLUTION/ DROPS OPHTHALMIC
Status: COMPLETED | OUTPATIENT
Start: 2020-07-13 | End: 2020-07-13

## 2020-07-13 RX ORDER — SODIUM CHLORIDE, SODIUM LACTATE, POTASSIUM CHLORIDE, CALCIUM CHLORIDE 600; 310; 30; 20 MG/100ML; MG/100ML; MG/100ML; MG/100ML
25 INJECTION, SOLUTION INTRAVENOUS CONTINUOUS
Status: DISCONTINUED | OUTPATIENT
Start: 2020-07-13 | End: 2020-07-14 | Stop reason: HOSPADM

## 2020-07-13 RX ORDER — DIPHENHYDRAMINE HYDROCHLORIDE 50 MG/ML
12.5 INJECTION, SOLUTION INTRAMUSCULAR; INTRAVENOUS AS NEEDED
Status: DISCONTINUED | OUTPATIENT
Start: 2020-07-13 | End: 2020-07-13 | Stop reason: HOSPADM

## 2020-07-13 RX ORDER — LIDOCAINE HYDROCHLORIDE 10 MG/ML
0.1 INJECTION, SOLUTION EPIDURAL; INFILTRATION; INTRACAUDAL; PERINEURAL AS NEEDED
Status: DISCONTINUED | OUTPATIENT
Start: 2020-07-13 | End: 2020-07-13 | Stop reason: HOSPADM

## 2020-07-13 RX ORDER — NEOMYCIN SULFATE, POLYMYXIN B SULFATE, AND DEXAMETHASONE 3.5; 10000; 1 MG/G; [USP'U]/G; MG/G
OINTMENT OPHTHALMIC AS NEEDED
Status: DISCONTINUED | OUTPATIENT
Start: 2020-07-13 | End: 2020-07-13 | Stop reason: HOSPADM

## 2020-07-13 RX ORDER — TETRACAINE HYDROCHLORIDE 5 MG/ML
SOLUTION OPHTHALMIC AS NEEDED
Status: DISCONTINUED | OUTPATIENT
Start: 2020-07-13 | End: 2020-07-13 | Stop reason: HOSPADM

## 2020-07-13 RX ORDER — SODIUM CHLORIDE 0.9 % (FLUSH) 0.9 %
5-40 SYRINGE (ML) INJECTION EVERY 8 HOURS
Status: DISCONTINUED | OUTPATIENT
Start: 2020-07-13 | End: 2020-07-13 | Stop reason: HOSPADM

## 2020-07-13 RX ORDER — SODIUM CHLORIDE 0.9 % (FLUSH) 0.9 %
5-40 SYRINGE (ML) INJECTION AS NEEDED
Status: DISCONTINUED | OUTPATIENT
Start: 2020-07-13 | End: 2020-07-13 | Stop reason: HOSPADM

## 2020-07-13 RX ORDER — ONDANSETRON 2 MG/ML
4 INJECTION INTRAMUSCULAR; INTRAVENOUS AS NEEDED
Status: DISCONTINUED | OUTPATIENT
Start: 2020-07-13 | End: 2020-07-14 | Stop reason: HOSPADM

## 2020-07-13 RX ORDER — SODIUM CHLORIDE, SODIUM LACTATE, POTASSIUM CHLORIDE, CALCIUM CHLORIDE 600; 310; 30; 20 MG/100ML; MG/100ML; MG/100ML; MG/100ML
25 INJECTION, SOLUTION INTRAVENOUS CONTINUOUS
Status: DISCONTINUED | OUTPATIENT
Start: 2020-07-13 | End: 2020-07-13 | Stop reason: HOSPADM

## 2020-07-13 RX ORDER — SODIUM CHLORIDE 9 MG/ML
25 INJECTION, SOLUTION INTRAVENOUS CONTINUOUS
Status: DISCONTINUED | OUTPATIENT
Start: 2020-07-13 | End: 2020-07-13 | Stop reason: HOSPADM

## 2020-07-13 RX ADMIN — TROPICAMIDE 1 DROP: 10 SOLUTION/ DROPS OPHTHALMIC at 11:23

## 2020-07-13 RX ADMIN — CIPROFLOXACIN 1 DROP: 3 SOLUTION OPHTHALMIC at 11:26

## 2020-07-13 RX ADMIN — MIDAZOLAM HYDROCHLORIDE 1 MG: 1 INJECTION, SOLUTION INTRAMUSCULAR; INTRAVENOUS at 11:58

## 2020-07-13 RX ADMIN — MIDAZOLAM HYDROCHLORIDE 1 MG: 1 INJECTION, SOLUTION INTRAMUSCULAR; INTRAVENOUS at 12:07

## 2020-07-13 RX ADMIN — TROPICAMIDE 1 DROP: 10 SOLUTION/ DROPS OPHTHALMIC at 11:26

## 2020-07-13 RX ADMIN — MIDAZOLAM HYDROCHLORIDE 1 MG: 1 INJECTION, SOLUTION INTRAMUSCULAR; INTRAVENOUS at 11:56

## 2020-07-13 RX ADMIN — CIPROFLOXACIN 1 DROP: 3 SOLUTION OPHTHALMIC at 11:23

## 2020-07-13 RX ADMIN — SODIUM CHLORIDE 25 ML/HR: 900 INJECTION, SOLUTION INTRAVENOUS at 11:23

## 2020-07-13 RX ADMIN — Medication 3 AMPULE: at 11:22

## 2020-07-13 RX ADMIN — MIDAZOLAM HYDROCHLORIDE 1 MG: 1 INJECTION, SOLUTION INTRAMUSCULAR; INTRAVENOUS at 12:04

## 2020-07-13 RX ADMIN — CIPROFLOXACIN 1 DROP: 3 SOLUTION OPHTHALMIC at 11:20

## 2020-07-13 RX ADMIN — TROPICAMIDE 1 DROP: 10 SOLUTION/ DROPS OPHTHALMIC at 11:20

## 2020-07-13 NOTE — BRIEF OP NOTE
Brief Postoperative Note    Patient: David Duke  YOB: 1945  MRN: [id number]    Date of Procedure: 7/13/2020     Pre-Op Diagnosis: Nuclear Sclerotic cataract left eye H25.12    Post-Op Diagnosis:   Nuclear Sclerotic cataract left eye H25.12    Procedure(s):  CATARACT EXTRACTION WITH INTRA OCULAR LENS IMPLANT LEFT EYE(LATEX ALLERGY)    Surgeon(s):  Karla Ramirez MD    Surgical Assistant: None    Anesthesia: MAC     Estimated Blood Loss (mL): none    Complications: none    Specimens: * No specimens in log *     Implants:   Implant Name Type Inv.  Item Serial No.  Lot No. LRB No. Used Action   IOL ASPHERIC 6.0 OZZIE +13.5 - [id number]  IOL ASPHERIC 6.0 OZZIE +13.5 [id number] [employer]  Left 1 Implanted       Drains: * No LDAs found *    Findings: cataract left eye    Electronically Signed by Georgiana Edwards MD on 7/13/2020 at 12:27 PM

## 2020-07-13 NOTE — PERIOP NOTES
[facility]  1945  [id number]    Situation:  Verbal report given from: Xochilt Martinez CRNA, Rebel Hunt RN  Procedure: Procedure(s):  CATARACT EXTRACTION WITH INTRA OCULAR LENS IMPLANT LEFT EYE(LATEX ALLERGY)    Background:    Preoperative diagnosis: CATARACT LEFT EYE    Postoperative diagnosis: CATARACT LEFT EYE    :  Dr. Ferdie Dance    Assistant(s): Circ-1: Doreen Loo  Scrub Tech-1: Roxy Spangler    Specimens: * No specimens in log *    Assessment:  Intra-procedure medications         Anesthesia gave intra-procedure sedation and medications, see anesthesia flow sheet     Intravenous fluids: NS@ KVO     Vital signs stable       Recommendation:

## 2020-07-13 NOTE — OP NOTES
Date of Procedure: 7/13/2020  Preoperative Diagnosis: Nuclear Sclerotic cataract left eye H25.12  Postoperative Diagnosis: Nuclear Sclerotic cataract left eye H25.12  Procedure: Extracapsular cataract extraction with lens implant left eye  Surgeon:  FELIPE Cohn MD  Assistants: None  Anesthesia: MAC with local  Estimated Blood Loss: None  Findings: Cataract left eye  Complications: None  Specimens: None  Prosthetic Devices: Intraocular lens implant     The patient's left eye was dilated with mydriacyl 1% and ciprofloxacin 0.3% for 3 doses preoperatively. The patient was taken to the operating room and was given sedation. Tetracaine was given topically to the left eye, and the eye was prepped and draped in the usual manner for sterile eye surgery, including Betadine solution being dropped onto the conjunctiva at the beginning of the prep. The eyelashes were isolated with a plastic drape. A lid speculum was placed.     A #15 blade was used to make a paracentesis at the 5:00 location. The eye was flushed with a lidocaine / epinephrine mixture (\"Shugarcaine\"). The eye was filled with Viscoat (Duovisc), and a crescent blade was used to make a 2.5 mm incision at the limbus temporally. This was dissected 2 mm into clear cornea, and the eye was entered with a 2.4 mm keratome. A 0.12 forceps was used for fixation during the procedure. A capsulorhexis flap was started with a cystotome, and this was completed 360 degrees with Utrata forceps. The capsular piece was removed. [place] dissection was performed with the \"Shugarcaine\" mixture on a cannula.     The lens nucleus was removed using phacoemulsification with a total phaco time of 2:07 minutes at 10.4%.     The lens was cracked and manipulated with a Sinsky hook. Residual cortex was removed using irrigation / aspiration.   The capsule remained intact.     The capsule was refilled with Provisc, and an Lonny Intraocular lens model SN60WF power 13.50 was placed in a lens folding cartridge with Provisc.     The lens was unfolded into the capsular bag. The lens centered well. Residual Provisc and Viscoat were removed using I / A. No suture was required to close the incision. The eye was flushed with BSS through the paracentesis. Betadine solution was placed on the conjunctival surface at the end of the case. The eye was left soft and formed at the end of the case. The incision site was water tight. The speculum was removed, and a drop of timolol 0.5% and neomycin/polymixin/dexamethasone ointment was placed on the eye followed by a shield. The patient tolerated the procedure well and is to follow-up in one day.

## 2020-07-13 NOTE — DISCHARGE INSTRUCTIONS
Ej Jensen MD  [facility]  [address]  [place], [place] Pantoja [facility]  Phone: [phone]       Fax: [phone]  If you are unable to keep appointment, kindly give 24 hours notice please. REMOVE PATCH  START DROPS WHEN YOU GET HOME  PUT PATCH BACK ON AT BEDTIME    1. DO NOT RUB the eye that was operated on. 2. Do not strain excessively. It is all right to bend as long as you do not strain. 3. It is safe to take a shower, wash your face, and wash your hair. Just keep the eye closed. 4. Do not swim for 1 week after surgery. 5. If you have any problems or questions, do not hesitate to call. There is always a physician on call at [phone] ext. [phone].   6. Follow instructions on eye drops from office. 7. You may take Tylenol or Advil for discomfort. If it pressure not relieved by Tylenol or Advil, please call Dr. Calin Tovar office. TO PREVENT AN INFECTION      1. [address] YOUR HANDS     To prevent infection, good handwashing is the most important thing you or your caregiver can do.  Wash your hands with soap and water or use the hand  we gave you before you touch any wounds. 2.  USE CLEAN SHEETS     Use freshly cleaned sheets on your bed after surgery.  To keep the surgery site clean, do not allow pets to sleep with you while your wound is still healing. 3. STOP SMOKING    Stop smoking, or at least cut back on smoking     Smoking slows your healing. 4.  CONTROL YOUR BLOOD SUGAR     High blood sugars slow wound healing.  If you are diabetic, control your blood sugar levels before and after your surgery. If you were given prescriptions, please review the written information on the prescribed medications. DO NOT DRIVE WHILE TAKING NARCOTIC PAIN MEDICATIONS.     DISCHARGE SUMMARY from Nurse    The following personal items collected during your admission are returned to you:   Dental Appliance: Dental Appliances: None  Vision: Visual Aid: Glasses  Hearing Aid:    Jewelry:    Clothing:    Other Valuables:    Valuables sent to safe:      PATIENT INSTRUCTIONS:    After general anesthesia or intravenous sedation, for 24 hours or while taking prescription Narcotics:  · Someone should be with you for the next 24 hours. · For your own safety, a responsible adult must drive you home. · Limit your activities  · Recommended activity: Rest today, Do not climb stairs or shower unattended for the next 24 hours. · Do not drive and operate hazardous machinery  · Do not make important personal or business decisions  · Do  not drink alcoholic beverages  · If you have not urinated within 8 hours after discharge, please contact your surgeon on call. Report the following to your surgeon:  · Excessive pain, swelling, redness or odor of or around the surgical area  · Temperature over 100.5  · Nausea and vomiting lasting longer than 4 hours or if unable to take medications    · You will receive a Post Operative Call from one of the Recovery Room Nurses on the day after your surgery to check on you. It is very important for us to know how you are recovering after your surgery. · You may receive an e-mail or letter in the mail from [facility] regarding your experience with us in the Ambulatory Surgery Unit. Your feedback is valuable to us and we appreciate your participation in the survey. · We wish youre a speedy recovery ? What to do at Home:      *  Please give a list of your current medications to your Primary Care Provider. *  Please update this list whenever your medications are discontinued, doses are      changed, or new medications (including over-the-counter products) are added. *  Please carry medication information at all times in case of emergency situations.           These are general instructions for a healthy lifestyle:    No smoking/ No tobacco products/ Avoid exposure to second hand smoke    Surgeon General's Warning:  Quitting smoking now greatly reduces serious risk to your health. Obesity, smoking, and sedentary lifestyle greatly increases your risk for illness    A healthy diet, regular physical exercise & weight monitoring are important for maintaining a healthy lifestyle    You may be retaining fluid if you have a history of heart failure or if you experience any of the following symptoms:  Weight gain of 3 pounds or more overnight or 5 pounds in a week, increased swelling in our hands or feet or shortness of breath while lying flat in bed. Please call your doctor as soon as you notice any of these symptoms; do not wait until your next office visit. Recognize signs and symptoms of STROKE:    B - Balance  E - Eyes    F-face looks uneven    A-arms unable to move or move even    S-speech slurred or non-existent    T-time-call 911 as soon as signs and symptoms begin-DO NOT go       Back to bed or wait to see if you get better-TIME IS BRAIN. If you have not received your influenza and/or pneumococcal vaccine, please follow up with your primary care physician. The discharge information has been reviewed with the patient and caregiver. The patient and caregiver verbalized understanding. Patient Education        Learning About Coronavirus [phone])  Coronavirus [phone]): Overview  What is coronavirus (UAURL-02)? The coronavirus disease (COVID-19) is caused by a virus. It is an illness that was first found in [place], [place], in December 2019. It has since spread worldwide. The virus can cause fever, cough, and trouble breathing. In severe cases, it can cause pneumonia and make it hard to breathe without help. It can cause death. Coronaviruses are a large group of viruses. They cause the common cold. They also cause more serious illnesses like Middle East respiratory syndrome (MERS) and severe acute respiratory syndrome (SARS). COVID-19 is caused by a novel coronavirus.  That means it's a new type that has not been seen in people before. This virus spreads person-to-person through droplets from coughing and sneezing. It can also spread when you are close to someone who is infected. And it can spread when you touch something that has the virus on it, such as a doorknob or a tabletop. What can you do to protect yourself from coronavirus (COVID-19)? The best way to protect yourself from getting sick is to:  · Avoid areas where there is an outbreak. · Avoid contact with people who may be infected. · Wash your hands often with soap or alcohol-based hand sanitizers. · Avoid crowds and try to stay at least 6 feet away from other people. · Wash your hands often, especially after you cough or sneeze. Use soap and water, and scrub for at least 20 seconds. If soap and water aren't available, use an alcohol-based hand . · Avoid touching your mouth, nose, and eyes. What can you do to avoid spreading the virus to others? To help avoid spreading the virus to others:  · Cover your mouth with a tissue when you cough or sneeze. Then throw the tissue in the trash. · Use a disinfectant to clean things that you touch often. · Wear a cloth face cover if you have to go to public areas. · Stay home if you are sick or have been exposed to the virus. Don't go to school, work, or public areas. And don't use public transportation, ride-shares, or taxis unless you have no choice. · If you are sick:  ? Leave your home only if you need to get medical care. But call the doctor's office first so they know you're coming. And wear a face cover. ? Wear the face cover whenever you're around other people. It can help stop the spread of the virus when you cough or sneeze. ? Clean and disinfect your home every day. Use household  and disinfectant wipes or sprays. Take special care to clean things that you grab with your hands. These include doorknobs, remote controls, phones, and handles on your refrigerator and microwave.  And don't forget countertops, tabletops, bathrooms, and computer keyboards. When to call for help  [id number] anytime you think you may need emergency care. For example, call if:  · You have severe trouble breathing. (You can't talk at all.)  · You have constant chest pain or pressure. · You are severely dizzy or lightheaded. · You are confused or can't think clearly. · Your face and lips have a blue color. · You pass out (lose consciousness) or are very hard to wake up. Call your doctor now if you develop symptoms such as:  · Shortness of breath. · Fever. · Cough. If you need to get care, call ahead to the doctor's office for instructions before you go. Make sure you wear a face cover to prevent exposing other people to the virus. Where can you get the latest information? The following health organizations are tracking and studying this virus. Their websites contain the most up-to-date information. Brenda Macias also learn what to do if you think you may have been exposed to the virus. · U.S. Centers for Disease Control and Prevention (CDC): The CDC provides updated news about the disease and travel advice. The website also tells you how to prevent the spread of infection. www.cdc.gov  · World Health Organization [facility]): WHO offers information about the virus outbreaks. WHO also has travel advice. www.who.int  Current as of: May 8, 2020               Content Version: 12.5  © 2006-2020 Healthwise, Incorporated. Care instructions adapted under license by [employer] (which disclaims liability or warranty for this information). If you have questions about a medical condition or this instruction, always ask your healthcare professional. [address] any warranty or liability for your use of this information.

## 2020-07-13 NOTE — PERIOP NOTES
Pt tolerating liquids, vss.  Pt denies any pain. Reviewed d/c instructions with pt's daughter Dayo Adams via telephone. All questions answered. Reviewed when to call the doctor. 1310-Transported via w/c to awaiting transportation. No complaints noted at time of d/c home.

## 2020-07-13 NOTE — PERIOP NOTES
Permission received to review discharge instructions and discuss private health information with daughter, Roxanne Sol. Patient states that daughter will be with them for at least 24 hours following today's procedure.

## 2020-07-13 NOTE — ANESTHESIA POSTPROCEDURE EVALUATION
Procedure(s):  CATARACT EXTRACTION WITH INTRA OCULAR LENS IMPLANT LEFT EYE(LATEX ALLERGY). MAC    Anesthesia Post Evaluation      Multimodal analgesia: multimodal analgesia not used between 6 hours prior to anesthesia start to PACU discharge  Patient location during evaluation: PACU  Patient participation: complete - patient participated  Level of consciousness: awake  Pain score: 0  Airway patency: patent  Anesthetic complications: no  Cardiovascular status: acceptable  Respiratory status: acceptable  Hydration status: acceptable  Post anesthesia nausea and vomiting:  none  Final Post Anesthesia Temperature Assessment:  Normothermia (36.0-37.5 degrees C)      INITIAL Post-op Vital signs:   Vitals Value Taken Time   /76 7/13/2020 12:45 PM   Temp 36.4 °C (97.6 °F) 7/13/2020 12:40 PM   Pulse 74 7/13/2020 12:46 PM   Resp 12 7/13/2020 12:46 PM   SpO2 94 % 7/13/2020 12:46 PM   Vitals shown include unvalidated device data.

## 2020-07-31 ENCOUNTER — OFFICE VISIT (OUTPATIENT)
Dept: INTERNAL MEDICINE CLINIC | Age: 75
End: 2020-07-31

## 2020-07-31 VITALS
WEIGHT: 168.8 LBS | BODY MASS INDEX: 31.06 KG/M2 | TEMPERATURE: 97.5 F | SYSTOLIC BLOOD PRESSURE: 158 MMHG | RESPIRATION RATE: 18 BRPM | DIASTOLIC BLOOD PRESSURE: 86 MMHG | HEART RATE: 75 BPM | HEIGHT: 62 IN | OXYGEN SATURATION: 96 %

## 2020-07-31 DIAGNOSIS — E78.00 PURE HYPERCHOLESTEROLEMIA: ICD-10-CM

## 2020-07-31 DIAGNOSIS — Z79.899 ON STATIN THERAPY: ICD-10-CM

## 2020-07-31 DIAGNOSIS — R53.83 FATIGUE, UNSPECIFIED TYPE: ICD-10-CM

## 2020-07-31 DIAGNOSIS — E03.9 ACQUIRED HYPOTHYROIDISM: Primary | ICD-10-CM

## 2020-07-31 DIAGNOSIS — Z23 ENCOUNTER FOR IMMUNIZATION: ICD-10-CM

## 2020-07-31 DIAGNOSIS — Z11.59 NEED FOR HEPATITIS C SCREENING TEST: ICD-10-CM

## 2020-07-31 DIAGNOSIS — R35.0 URINARY FREQUENCY: ICD-10-CM

## 2020-07-31 NOTE — PROGRESS NOTES
This note will not be viewable in [address] Leslee Grijalva is a 76 y.o. female and presents with Follow Up Chronic Condition; Thyroid Problem; and Cholesterol Problem  . Subjective:    Mrs. Anjel Lambert presents today for follow-up of hypothyroidism, hyperlipidemia, monitoring statin therapy. She is doing well on her current medical regimen. She recently had cataract surgery with excellent results. She had a bone density test done approximately 2 years ago which was excellent. She denies any shortness of breath, chest pain, palpitations, PND, orthopnea, or pedal edema. Review of Systems  Constitutional:   Eyes:   negative for visual disturbance and irritation  ENT:   negative for tinnitus,sore throat,nasal congestion,ear pains. hoarseness  Respiratory:  negative for cough, hemoptysis, dyspnea,wheezing  CV:   negative for chest pain, palpitations, lower extremity edema  GI:   negative for nausea, vomiting, diarrhea, abdominal pain,melena  Endo:               negative for polyuria,polydipsia,polyphagia,heat intolerance  Genitourinary: negative for frequency, dysuria and hematuria  Integumentary: negative for rash and pruritus  Hematologic:  negative for easy bruising and gum/nose bleeding  Musculoskel: negative for myalgias, arthralgias, back pain, muscle weakness, joint pain  Neurological:  negative for headaches, dizziness, vertigo, memory problems and gait   Behavl/Psych: negative for feelings of anxiety, depression, mood changes    Past Medical History:   Diagnosis Date    Adult onset hypothyroidism 10/19/2017    Arthritis     feet and hands    Back pain with radiation 10/19/2017    Breast cyst 6 months ago     right breast     Disorder of bone and cartilage 10/19/2017    GERD (gastroesophageal reflux disease)     High risk medication use 10/19/2017    Hip pain, acute, left 10/19/2017    hypercholesteremia     elevatd cholesterol    Hyperlipidemia 10/19/2017    Hypothyroid     hypothyroid    Hypothyroid 10/19/2017    Irritable bowel syndrome with constipation 10/19/2017    Knee pain 10/19/2017    Menopausal disorder 10/19/2017    Migraine 10/19/2017    Migraines     Neurological disorder     migraines    On statin therapy 6/18/2019    Urinary frequency 10/19/2017     Past Surgical History:   Procedure Laterality Date    ABDOMEN SURGERY PROC UNLISTED  2011    removed \"sludge\" from duct in gallbladder    COLONOSCOPY Left 6/1/2018    [facility]    HX BREAST BIOPSY Left 11 years ago     negative    HX CATARACT REMOVAL Left 07/2020    HX GYN      hysterectomy    HX ORTHOPAEDIC  11/9/11    SPINE LUMBAR LAMINECTOMY - L4-5 LAMINECTOMY FOR REMOVAL OF EXTRA DURAL LESION ON THE LEFT - benign    HX OTHER SURGICAL      ganglion cyst removed finger    HX TONSILLECTOMY       Social History     Socioeconomic History    Marital status:      Spouse name: Not on file    Number of children: Not on file    Years of education: Not on file    Highest education level: Not on file   Tobacco Use    Smoking status: Never Smoker    Smokeless tobacco: Never Used   Substance and Sexual Activity    Alcohol use: Yes     Comment: rarely    Drug use: No    Sexual activity: Yes     Partners: Male     Birth control/protection: Surgical     Family History   Problem Relation Age of Onset    Heart Disease Mother         \"shrinking heart valve\"     Current Outpatient Medications   Medication Sig Dispense Refill    amitriptyline (ELAVIL) 25 mg tablet TAKE 2 TABS BY MOUTH NIGHTLY. 180 Tab 2    simvastatin (ZOCOR) 20 mg tablet TAKE 1 TABLET EVERY DAY 90 Tab 3    levothyroxine (SYNTHROID) 125 mcg tablet TAKE 1 TABLET EVERY DAY FOR THYROID 90 Tab 3    omeprazole (PRILOSEC OTC) 20 mg tablet Take 20 mg by mouth daily. Allergies   Allergen Reactions    Latex Rash     Bandages causes a rash if left on for more than a couple of days.  She buys latex free band-aids.  Codeine Nausea and Vomiting       Objective:  Visit Vitals  /86 (BP 1 Location: Left arm, BP Patient Position: Sitting)   Pulse 75   Temp 97.5 °F (36.4 °C) (Oral)   Resp 18   Ht 5' 2\" (1.575 m)   Wt 168 lb 12.8 oz (76.6 kg)   LMP  (LMP Unknown)   SpO2 96%   BMI 30.87 kg/m²     Physical Exam:   General appearance - alert, well appearing, and in no distress  Mental status - alert, oriented to person, place, and time  EYE-NIEVES, EOMI, fundi normal, corneas normal, no foreign bodies  ENT-ENT exam normal, no neck nodes or sinus tenderness  Nose - normal and patent, no erythema, discharge or polyps  Mouth - mucous membranes moist, pharynx normal without lesions  Neck - supple, no significant adenopathy   Chest - clear to auscultation, no wheezes, rales or rhonchi, symmetric air entry   Heart - normal rate, regular rhythm, normal S1, S2, no murmurs, rubs, clicks or gallops   Abdomen - soft, nontender, nondistended, no masses or organomegaly  Lymph- no adenopathy palpable  Ext-peripheral pulses normal, no pedal edema, no clubbing or cyanosis  Skin-Warm and dry. no hyperpigmentation, vitiligo, or suspicious lesions  Neuro -alert, oriented, normal speech, no focal findings or movement disorder noted  Musculoskeletal- FROM, no bony abnormalities, no point tenderness    No results found for this visit on 07/31/20. All results for lab orders may not have been returned by the time this encountered was closed. Assessment/Plan:       ICD-10-CM ICD-9-CM    1. Acquired hypothyroidism  E03.9 244.9 TSH 3RD GENERATION      T4, FREE   2. Pure hypercholesterolemia  E78.00 272.0 CK      LIPID PANEL      METABOLIC PANEL, COMPREHENSIVE   3. On statin therapy  Z79.899 V58.69 CK      LIPID PANEL      METABOLIC PANEL, COMPREHENSIVE   4. Urinary frequency  R35.0 788.41 URINALYSIS W/O MICRO   5. Fatigue, unspecified type  R53.83 780.79 CBC WITH AUTOMATED DIFF   6.  Need for hepatitis C screening test  Z11.59 V73.89 HEPATITIS C AB   7. Encounter for immunization  Z23 V03.89 PNEUMOCOCCAL POLYSACCHARIDE VACCINE, 23-VALENT, ADULT OR IMMUNOSUPPRESSED PT DOSE,      ADMIN PNEUMOCOCCAL VACCINE       Orders Placed This Encounter    PNEUMOCOCCAL POLYSACCHARIDE VACCINE, 23-VALENT, ADULT OR IMMUNOSUPPRESSED PT DOSE,    CK ([facility])    LIPID PANEL ([facility])    METABOLIC PANEL, COMPREHENSIVE ([facility])    TSH 3RD GENERATION ([facility])    T4, FREE ([facility])    URINALYSIS W/O MICRO ([facility])    CBC WITH AUTOMATED DIFF ([facility])    HEPATITIS C AB    ADMIN PNEUMOCOCCAL VACCINE  (MEDICARE ONLY)     Plan:    Continue current medical regimen as outlined above. Further recommendations based on labs as ordered. Hepatitis C antibody screening test and Pneumovax to be given today. I have reviewed with the patient details of the assessment and plan and all questions were answered. Relevent patient education was performed. Verbal and/or written instructions (see AVS) provided. The most recent lab findings were reviewed with the patient. Plan was discussed with patient who verbal expressed understanding. An After Visit Summary was printed and given to the patient.       Karime Merchant MD

## 2020-07-31 NOTE — PROGRESS NOTES
Reviewed record in preparation for visit and have obtained necessary documentation. Identified pt with two pt identifiers(name and ). Chief Complaint   Patient presents with    Follow Up Chronic Condition    Thyroid Problem    Cholesterol Problem        Coordination of Care Questionnaire:  :     1) Have you been to an emergency room, urgent care clinic since your last visit? No     Hospitalized since your last visit? No               2) Have you seen or consulted any other health care providers outside of [address] [place] [address] since your last visit? Yes Dr Alexa Lambert      After obtaining consent, and per orders of Dr. Ta Luna, injection of Pneumovax 23 in left deltoid given by Chanelle Cardona. Patient instructed to remain in clinic for 20 minutes afterwards, and to report any adverse reaction to me immediately.

## 2020-07-31 NOTE — PATIENT INSTRUCTIONS
Vaccine Information Statement    Pneumococcal Polysaccharide Vaccine (PPSV23): What You Need to Know    Many Vaccine Information Statements are available in [place] and other languages. See www.immunize.org/vis  Hojas de información sobre vacunas están disponibles en español y en muchos otros idiomas. Visite www.immunize.org/vis    1. Why get vaccinated? Pneumococcal polysaccharide vaccine (PPSV23) can prevent pneumococcal disease. Pneumococcal disease refers to any illness caused by pneumococcal bacteria. These bacteria can cause many types of illnesses, including pneumonia, which is an infection of the lungs. Pneumococcal bacteria are one of the most common causes of pneumonia. Besides pneumonia, pneumococcal bacteria can also cause:   Ear infections   Sinus infections   Meningitis (infection of the tissue covering the brain and spinal cord)   Bacteremia (bloodstream infection)    Anyone can get pneumococcal disease, but children under 3years of age, people with certain medical conditions, adults 72 years or older, and cigarette smokers are at the highest risk. Most pneumococcal infections are mild. However, some can result in long-term problems, such as brain damage or hearing loss. Meningitis, bacteremia, and pneumonia caused by pneumococcal disease can be fatal.     2. PPSV23     PPSV23 protects against 23 types of bacteria that cause pneumococcal disease. PPSV23 is recommended for:   All adults 72 years or older,   Anyone 2 years or older with certain medical conditions that can lead to an increased risk for pneumococcal disease. Most people need only one dose of PPSV23. A second dose of PPSV23, and another type of pneumococcal vaccine called PCV13, are recommended for certain high-risk groups. Your health care provider can give you more information.     People 65 years or older should get a dose of PPSV23 even if they have already gotten one or more doses of the vaccine before they turned 72.    3. Talk with your health care provider    Tell your vaccine provider if the person getting the vaccine:   Has had an allergic reaction after a previous dose of PPSV23, or has any severe, life-threatening allergies. In some cases, your health care provider may decide to postpone PPSV23 vaccination to a future visit. People with minor illnesses, such as a cold, may be vaccinated. People who are moderately or severely ill should usually wait until they recover before getting PPSV23. Your health care provider can give you more information. 4. Risks of a vaccine reaction     Redness or pain where the shot is given, feeling tired, fever, or muscle aches can happen after PPSV23. People sometimes faint after medical procedures, including vaccination. Tell your provider if you feel dizzy or have vision changes or ringing in the ears. As with any medicine, there is a very remote chance of a vaccine causing a severe allergic reaction, other serious injury, or death. 5. What if there is a serious problem? An allergic reaction could occur after the vaccinated person leaves the clinic. If you see signs of a severe allergic reaction (hives, swelling of the face and throat, difficulty breathing, a fast heartbeat, dizziness, or weakness), call 9-1-1 and get the person to the nearest hospital.    For other signs that concern you, call your health care provider. Adverse reactions should be reported to the Vaccine Adverse Event Reporting System (VAERS). Your health care provider will usually file this report, or you can do it yourself. Visit the VAERS website at www.vaers. hhs.gov or call 1-898.647.7691. VAERS is only for reporting reactions, and VAERS staff do not give medical advice. 6. How can I learn more?  Ask your health care provider.  Call your local or state health department.    Contact the Centers for Disease Control and Prevention (CDC):  - Call 6-441.993.6157 (0-206-LJS-INFO) or  - Visit CDCs website at www.cdc.gov/vaccines    Vaccine Information Statement   PPSV23   10/30/2019    Department of Health and [facility] for Disease Control and Prevention    Office Use Only

## 2020-08-06 ENCOUNTER — LAB ONLY (OUTPATIENT)
Dept: INTERNAL MEDICINE CLINIC | Age: 75
End: 2020-08-06
Payer: MEDICARE

## 2020-08-06 DIAGNOSIS — E03.9 ACQUIRED HYPOTHYROIDISM: ICD-10-CM

## 2020-08-06 DIAGNOSIS — R53.83 FATIGUE, UNSPECIFIED TYPE: ICD-10-CM

## 2020-08-06 DIAGNOSIS — N39.0 URINARY TRACT INFECTION WITHOUT HEMATURIA, SITE UNSPECIFIED: ICD-10-CM

## 2020-08-06 DIAGNOSIS — R35.0 URINARY FREQUENCY: ICD-10-CM

## 2020-08-06 DIAGNOSIS — Z11.59 NEED FOR HEPATITIS C SCREENING TEST: Primary | ICD-10-CM

## 2020-08-06 DIAGNOSIS — E78.00 PURE HYPERCHOLESTEROLEMIA: ICD-10-CM

## 2020-08-06 DIAGNOSIS — Z79.899 ON STATIN THERAPY: ICD-10-CM

## 2020-08-06 LAB
A-G RATIO,AGRAT: 1.5 RATIO
ALBUMIN SERPL-MCNC: 4.1 G/DL (ref 3.9–5.4)
ALP SERPL-CCNC: 79 U/L (ref 38–126)
ALT SERPL-CCNC: 15 U/L (ref 0–35)
ANION GAP SERPL CALC-SCNC: 8 MMOL/L
AST SERPL W P-5'-P-CCNC: 25 U/L (ref 14–36)
BACTERIA,BACTU: ABNORMAL
BILIRUB SERPL-MCNC: 0.6 MG/DL (ref 0.2–1.3)
BILIRUB UR QL: ABNORMAL
BUN SERPL-MCNC: 16 MG/DL (ref 7–17)
BUN/CREATININE RATIO,BUCR: 16 RATIO
CALCIUM SERPL-MCNC: 9.4 MG/DL (ref 8.4–10.2)
CHLORIDE SERPL-SCNC: 101 MMOL/L (ref 98–107)
CK SERPL-CCNC: 59 U/L (ref 30–135)
CLARITY: CLEAR
CO2 SERPL-SCNC: 29 MMOL/L (ref 22–32)
COLOR UR: ABNORMAL
CREAT SERPL-MCNC: 1 MG/DL (ref 0.7–1.2)
ERYTHROCYTE [DISTWIDTH] IN BLOOD BY AUTOMATED COUNT: 13.1 %
GLOBULIN,GLOB: 2.7
GLUCOSE 24H UR-MRATE: ABNORMAL G/(24.H)
GLUCOSE SERPL-MCNC: 86 MG/DL (ref 65–105)
HCT VFR BLD AUTO: 41.5 % (ref 37–51)
HGB BLD-MCNC: 13.7 G/DL (ref 12–18)
HGB UR QL STRIP: ABNORMAL
KETONES UR QL STRIP.AUTO: ABNORMAL
LEUKOCYTE ESTERASE: ABNORMAL
LYMPHOCYTES ABSOLUTE: 1.8 K/UL (ref 0.6–4.1)
LYMPHOCYTES NFR BLD: 30.9 % (ref 10–58.5)
MCH RBC QN AUTO: 30.6 PG (ref 26–32)
MCHC RBC AUTO-ENTMCNC: 33 G/DL (ref 30–36)
MCV RBC AUTO: 92.7 FL (ref 80–97)
MONOCYTES ABS-DIF,2141: 0.5 K/UL (ref 0–1.8)
MONOCYTES NFR BLD: 9.1 % (ref 0.1–24)
NEUTROPHILS # BLD: 60 % (ref 37–92)
NEUTROPHILS ABS,2156: 3.5 K/UL (ref 2–7.8)
NITRITE UR QL STRIP.AUTO: ABNORMAL
PH UR STRIP: 5 [PH] (ref 5–7)
PLATELET # BLD AUTO: 237 K/UL (ref 140–440)
POTASSIUM SERPL-SCNC: 4.4 MMOL/L (ref 3.6–5)
PROT SERPL-MCNC: 6.8 G/DL (ref 6.3–8.2)
PROT UR STRIP-MCNC: ABNORMAL MG/DL
RBC # BLD AUTO: 4.48 M/UL (ref 4.2–6.3)
RBC #/AREA URNS HPF: ABNORMAL #/HPF
SODIUM SERPL-SCNC: 138 MMOL/L (ref 137–145)
SP GR UR REFRACTOMETRY: 1.01 (ref 1–1.03)
UROBILINOGEN UR QL STRIP.AUTO: ABNORMAL
WBC # BLD AUTO: 5.8 K/UL (ref 4.1–10.9)
WBC URNS QL MICRO: ABNORMAL #/HPF

## 2020-08-06 PROCEDURE — 80053 COMPREHEN METABOLIC PANEL: CPT | Performed by: INTERNAL MEDICINE

## 2020-08-06 PROCEDURE — 84439 ASSAY OF FREE THYROXINE: CPT | Performed by: INTERNAL MEDICINE

## 2020-08-06 PROCEDURE — 85025 COMPLETE CBC W/AUTO DIFF WBC: CPT | Performed by: INTERNAL MEDICINE

## 2020-08-06 PROCEDURE — 84443 ASSAY THYROID STIM HORMONE: CPT | Performed by: INTERNAL MEDICINE

## 2020-08-06 PROCEDURE — 81003 URINALYSIS AUTO W/O SCOPE: CPT | Performed by: INTERNAL MEDICINE

## 2020-08-06 PROCEDURE — 82550 ASSAY OF CK (CPK): CPT | Performed by: INTERNAL MEDICINE

## 2020-08-07 LAB
CHOLEST SERPL-MCNC: 175 MG/DL (ref 100–199)
HCV AB S/CO SERPL IA: 0.3 S/CO RATIO (ref 0–0.9)
HDLC SERPL-MCNC: 60 MG/DL
LDLC SERPL CALC-MCNC: 97 MG/DL (ref 0–99)
TRIGL SERPL-MCNC: 89 MG/DL (ref 0–149)
VLDLC SERPL CALC-MCNC: 18 MG/DL (ref 5–40)

## 2020-08-08 LAB — BACTERIA UR CULT: NORMAL

## 2020-08-10 LAB
T4 FREE SERPL-MCNC: 1.52 NG/DL (ref 0.58–2.3)
TSH SERPL DL<=0.05 MIU/L-ACNC: 0.39 UIU/ML (ref 0.34–5.6)

## 2020-08-10 NOTE — PROGRESS NOTES
There was trace urinary sediment but a urine culture was negative. Your cholesterol profile is excellent. Your glucose is normal.  Your liver and kidney function are normal.  You had a hepatitis C screening test which was negative. Your thyroid profile is pending.

## 2020-12-16 RX ORDER — SIMVASTATIN 20 MG/1
TABLET, FILM COATED ORAL
Qty: 90 TAB | Refills: 3 | Status: SHIPPED | OUTPATIENT
Start: 2020-12-16 | End: 2021-12-15

## 2021-01-15 RX ORDER — AMITRIPTYLINE HYDROCHLORIDE 25 MG/1
TABLET, FILM COATED ORAL
Qty: 180 TAB | Refills: 3 | Status: SHIPPED | OUTPATIENT
Start: 2021-01-15 | End: 2022-01-06

## 2021-01-25 RX ORDER — LEVOTHYROXINE SODIUM 125 UG/1
TABLET ORAL
Qty: 90 TAB | Refills: 3 | Status: SHIPPED | OUTPATIENT
Start: 2021-01-25 | End: 2022-01-21

## 2021-02-05 ENCOUNTER — OFFICE VISIT (OUTPATIENT)
Dept: INTERNAL MEDICINE CLINIC | Age: 76
End: 2021-02-05
Payer: MEDICARE

## 2021-02-05 VITALS
DIASTOLIC BLOOD PRESSURE: 70 MMHG | TEMPERATURE: 97.9 F | BODY MASS INDEX: 30.84 KG/M2 | HEART RATE: 95 BPM | HEIGHT: 62 IN | WEIGHT: 167.6 LBS | RESPIRATION RATE: 16 BRPM | OXYGEN SATURATION: 99 % | SYSTOLIC BLOOD PRESSURE: 118 MMHG

## 2021-02-05 DIAGNOSIS — R35.0 URINARY FREQUENCY: ICD-10-CM

## 2021-02-05 DIAGNOSIS — E03.9 ACQUIRED HYPOTHYROIDISM: ICD-10-CM

## 2021-02-05 DIAGNOSIS — R53.83 FATIGUE, UNSPECIFIED TYPE: ICD-10-CM

## 2021-02-05 DIAGNOSIS — Z13.31 SCREENING FOR DEPRESSION: ICD-10-CM

## 2021-02-05 DIAGNOSIS — E78.00 PURE HYPERCHOLESTEROLEMIA: ICD-10-CM

## 2021-02-05 DIAGNOSIS — Z13.1 SCREENING FOR DIABETES MELLITUS: ICD-10-CM

## 2021-02-05 DIAGNOSIS — Z00.00 MEDICARE ANNUAL WELLNESS VISIT, SUBSEQUENT: Primary | ICD-10-CM

## 2021-02-05 DIAGNOSIS — K58.1 IRRITABLE BOWEL SYNDROME WITH CONSTIPATION: ICD-10-CM

## 2021-02-05 DIAGNOSIS — Z13.6 SCREENING FOR ISCHEMIC HEART DISEASE: ICD-10-CM

## 2021-02-05 DIAGNOSIS — Z79.899 ON STATIN THERAPY: ICD-10-CM

## 2021-02-05 LAB
A-G RATIO,AGRAT: 1.8 RATIO
ALBUMIN SERPL-MCNC: 4.2 G/DL (ref 3.9–5.4)
ALP SERPL-CCNC: 81 U/L (ref 38–126)
ALT SERPL-CCNC: 13 U/L (ref 0–35)
ANION GAP SERPL CALC-SCNC: 7 MMOL/L
AST SERPL W P-5'-P-CCNC: 28 U/L (ref 14–36)
BILIRUB SERPL-MCNC: 0.7 MG/DL (ref 0.2–1.3)
BILIRUB UR QL: NEGATIVE
BUN SERPL-MCNC: 14 MG/DL (ref 7–17)
BUN/CREATININE RATIO,BUCR: 16 RATIO
CALCIUM SERPL-MCNC: 9.5 MG/DL (ref 8.4–10.2)
CHLORIDE SERPL-SCNC: 102 MMOL/L (ref 98–107)
CHOL/HDL RATIO,CHHD: 3 RATIO (ref 0–4)
CHOLEST SERPL-MCNC: 191 MG/DL (ref 0–200)
CK SERPL-CCNC: 94 U/L (ref 30–135)
CLARITY: CLEAR
CO2 SERPL-SCNC: 32 MMOL/L (ref 22–32)
COLOR UR: ABNORMAL
CREAT SERPL-MCNC: 0.9 MG/DL (ref 0.7–1.2)
ERYTHROCYTE [DISTWIDTH] IN BLOOD BY AUTOMATED COUNT: 12.4 %
GLOBULIN,GLOB: 2.4
GLUCOSE 24H UR-MRATE: NEGATIVE G/(24.H)
GLUCOSE SERPL-MCNC: 91 MG/DL (ref 65–105)
HCT VFR BLD AUTO: 41.9 % (ref 37–51)
HDLC SERPL-MCNC: 72 MG/DL (ref 35–130)
HGB BLD-MCNC: 13.7 G/DL (ref 12–18)
HGB UR QL STRIP: ABNORMAL
KETONES UR QL STRIP.AUTO: NEGATIVE
LDL/HDL RATIO,LDHD: 1 RATIO
LDLC SERPL CALC-MCNC: 96 MG/DL (ref 0–130)
LEUKOCYTE ESTERASE: NEGATIVE
LYMPHOCYTES ABSOLUTE: 2.6 K/UL (ref 0.6–4.1)
LYMPHOCYTES NFR BLD: 37.6 % (ref 10–58.5)
MCH RBC QN AUTO: 30.2 PG (ref 26–32)
MCHC RBC AUTO-ENTMCNC: 32.7 G/DL (ref 30–36)
MCV RBC AUTO: 92.2 FL (ref 80–97)
MONOCYTES ABS-DIF,2141: 0.6 K/UL (ref 0–1.8)
MONOCYTES NFR BLD: 8.4 % (ref 0.1–24)
NEUTROPHILS # BLD: 54 % (ref 37–92)
NEUTROPHILS ABS,2156: 3.7 K/UL (ref 2–7.8)
NITRITE UR QL STRIP.AUTO: NEGATIVE
PH UR STRIP: 5 [PH] (ref 5–7)
PLATELET # BLD AUTO: 262 K/UL (ref 140–440)
POTASSIUM SERPL-SCNC: 4.2 MMOL/L (ref 3.6–5)
PROT SERPL-MCNC: 6.6 G/DL (ref 6.3–8.2)
PROT UR STRIP-MCNC: NEGATIVE MG/DL
RBC # BLD AUTO: 4.54 M/UL (ref 4.2–6.3)
RBC #/AREA URNS HPF: ABNORMAL #/HPF
SODIUM SERPL-SCNC: 141 MMOL/L (ref 137–145)
SP GR UR REFRACTOMETRY: 1.01 (ref 1–1.03)
T4 FREE SERPL-MCNC: 1.29 NG/DL (ref 0.58–2.3)
TRIGL SERPL-MCNC: 114 MG/DL (ref 0–200)
TSH SERPL DL<=0.05 MIU/L-ACNC: 0.33 UIU/ML (ref 0.34–5.6)
UROBILINOGEN UR QL STRIP.AUTO: NEGATIVE
VLDLC SERPL CALC-MCNC: 23 MG/DL
WBC # BLD AUTO: 6.8 K/UL (ref 4.1–10.9)
WBC URNS QL MICRO: 0 #/HPF

## 2021-02-05 PROCEDURE — 3017F COLORECTAL CA SCREEN DOC REV: CPT | Performed by: INTERNAL MEDICINE

## 2021-02-05 PROCEDURE — G0439 PPPS, SUBSEQ VISIT: HCPCS | Performed by: INTERNAL MEDICINE

## 2021-02-05 PROCEDURE — G8536 NO DOC ELDER MAL SCRN: HCPCS | Performed by: INTERNAL MEDICINE

## 2021-02-05 PROCEDURE — 84443 ASSAY THYROID STIM HORMONE: CPT | Performed by: INTERNAL MEDICINE

## 2021-02-05 PROCEDURE — 80053 COMPREHEN METABOLIC PANEL: CPT | Performed by: INTERNAL MEDICINE

## 2021-02-05 PROCEDURE — 1090F PRES/ABSN URINE INCON ASSESS: CPT | Performed by: INTERNAL MEDICINE

## 2021-02-05 PROCEDURE — 81003 URINALYSIS AUTO W/O SCOPE: CPT | Performed by: INTERNAL MEDICINE

## 2021-02-05 PROCEDURE — G8399 PT W/DXA RESULTS DOCUMENT: HCPCS | Performed by: INTERNAL MEDICINE

## 2021-02-05 PROCEDURE — 99214 OFFICE O/P EST MOD 30 MIN: CPT | Performed by: INTERNAL MEDICINE

## 2021-02-05 PROCEDURE — G8510 SCR DEP NEG, NO PLAN REQD: HCPCS | Performed by: INTERNAL MEDICINE

## 2021-02-05 PROCEDURE — 1101F PT FALLS ASSESS-DOCD LE1/YR: CPT | Performed by: INTERNAL MEDICINE

## 2021-02-05 PROCEDURE — G8427 DOCREV CUR MEDS BY ELIG CLIN: HCPCS | Performed by: INTERNAL MEDICINE

## 2021-02-05 PROCEDURE — 85025 COMPLETE CBC W/AUTO DIFF WBC: CPT | Performed by: INTERNAL MEDICINE

## 2021-02-05 PROCEDURE — G8417 CALC BMI ABV UP PARAM F/U: HCPCS | Performed by: INTERNAL MEDICINE

## 2021-02-05 PROCEDURE — 84439 ASSAY OF FREE THYROXINE: CPT | Performed by: INTERNAL MEDICINE

## 2021-02-05 PROCEDURE — 80061 LIPID PANEL: CPT | Performed by: INTERNAL MEDICINE

## 2021-02-05 PROCEDURE — 82550 ASSAY OF CK (CPK): CPT | Performed by: INTERNAL MEDICINE

## 2021-02-05 PROCEDURE — G0444 DEPRESSION SCREEN ANNUAL: HCPCS | Performed by: INTERNAL MEDICINE

## 2021-02-05 NOTE — PATIENT INSTRUCTIONS
Medicare Wellness Visit, Female     The best way to live healthy is to have a lifestyle where you eat a well-balanced diet, exercise regularly, limit alcohol use, and quit all forms of tobacco/nicotine, if applicable. Regular preventive services are another way to keep healthy. Preventive services (vaccines, screening tests, monitoring & exams) can help personalize your care plan, which helps you manage your own care. Screening tests can find health problems at the earliest stages, when they are easiest to treat. [facility] follows the current, evidence-based guidelines published by the [facility] Par[facility] ([facility]) when recommending preventive services for our patients. Because we follow these guidelines, sometimes recommendations change over time as research supports it. (For example, mammograms used to be recommended annually. Even though Medicare will still pay for an annual mammogram, the newer guidelines recommend a mammogram every two years for women of average risk). Of course, you and your doctor may decide to screen more often for some diseases, based on your risk and your co-morbidities (chronic disease you are already diagnosed with). Preventive services for you include:  - Medicare offers their members a free annual wellness visit, which is time for you and your primary care provider to discuss and plan for your preventive service needs. Take advantage of this benefit every year!  -All adults over the age of 72 should receive the recommended pneumonia vaccines. Current USPSTF guidelines recommend a series of two vaccines for the best pneumonia protection.   -All adults should have a flu vaccine yearly and a tetanus vaccine every 10 years.   -All adults age 48 and older should receive the shingles vaccines (series of two vaccines).       -All adults age 38-68 who are overweight should have a diabetes screening test once every three years.   -All adults born between [birth year] and 1965 should be screened once for Hepatitis C.  -Other screening tests and preventive services for persons with diabetes include: an eye exam to screen for diabetic retinopathy, a kidney function test, a foot exam, and stricter control over your cholesterol.   -Cardiovascular screening for adults with routine risk involves an electrocardiogram (ECG) at intervals determined by your doctor.   -Colorectal cancer screenings should be done for adults age 54-65 with no increased risk factors for colorectal cancer. There are a number of acceptable methods of screening for this type of cancer. Each test has its own benefits and drawbacks. Discuss with your doctor what is most appropriate for you during your annual wellness visit. The different tests include: colonoscopy (considered the best screening method), a fecal occult blood test, a fecal DNA test, and sigmoidoscopy.    -A bone mass density test is recommended when a woman turns 65 to screen for osteoporosis. This test is only recommended one time, as a screening. Some providers will use this same test as a disease monitoring tool if you already have osteoporosis. -Breast cancer screenings are recommended every other year for women of normal risk, age 54-69.  -Cervical cancer screenings for women over age 72 are only recommended with certain risk factors.      Here is a list of your current Health Maintenance items (your personalized list of preventive services) with a due date:  Health Maintenance Due   Topic Date Due    COVID-19 Vaccine (1 of 2) 05/01/1961    DTaP/Tdap/Td  (1 - Tdap) 05/01/1966    Shingles Vaccine (1 of 2) 05/01/1995    Glaucoma Screening   05/01/2010    Yearly Flu Vaccine (1) 09/01/2020

## 2021-02-05 NOTE — PROGRESS NOTES
Marlene Nesbitt is a 76 y.o. female     Chief Complaint   Patient presents with    Thyroid Problem     6 month follow up   [address] Visit     medicare wellness        Visit Vitals  /70 (BP 1 Location: Left upper arm, BP Patient Position: Sitting, BP Cuff Size: Large adult)   Pulse 95   Temp 97.9 °F (36.6 °C) (Temporal)   Resp 16   Ht 5' 2\" (1.575 m)   Wt 167 lb 9.6 oz (76 kg)   LMP  (LMP Unknown)   SpO2 99%   BMI 30.65 kg/m²       Health Maintenance Due   Topic Date Due    COVID-19 Vaccine (1 of 2) 05/01/1961    DTaP/Tdap/Td series (1 - Tdap) 05/01/1966    Shingrix Vaccine Age 50> (1 of 2) 05/01/1995    GLAUCOMA SCREENING Q2Y  05/01/2010    Flu Vaccine (1) 09/01/2020    Medicare Yearly Exam  01/31/2021       1. Have you been to the ER, urgent care clinic since your last visit? Hospitalized since your last visit? No    2. Have you seen or consulted any other health care providers outside of the [address] [place] [address] since your last visit? Include any pap smears or colon screening.  No

## 2021-02-05 NOTE — PROGRESS NOTES
This is the Subsequent Medicare Annual Wellness Exam, performed 12 months or more after the Initial AWV or the last Subsequent AWV    I have reviewed the patient's medical history in detail and updated the computerized patient record. Depression Risk Factor Screening:     3 most recent PHQ Screens 2/5/2021   Little interest or pleasure in doing things Not at all   Feeling down, depressed, irritable, or hopeless Not at all   Total Score PHQ 2 0       Alcohol Risk Screen    Do you average more than 1 drink per night or more than 7 drinks a week:  No    On any one occasion in the past three months have you have had more than 3 drinks containing alcohol:  No        Functional Ability and Level of Safety:    Hearing: Hearing is good. Activities of Daily Living: The home contains: no safety equipment. Patient does total self care      Ambulation: with no difficulty     Fall Risk:  Fall Risk Assessment, last 12 mths 2/5/2021   Able to walk? Yes   Fall in past 12 months? 0   Do you feel unsteady? 0   Are you worried about falling 0   Number of falls in past 12 months -   Fall with injury? -      Abuse Screen:  Patient is not abused       Cognitive Screening    Has your family/caregiver stated any concerns about your memory: no     Cognitive Screening: Normal - Verbal Fluency Test    Assessment/Plan   Education and counseling provided:  Are appropriate based on today's review and evaluation    Diagnoses and all orders for this visit:    1. Medicare annual wellness visit, subsequent    2. Irritable bowel syndrome with constipation    3. Acquired hypothyroidism  -     TSH 3RD GENERATION  -     T4, FREE    4. Pure hypercholesterolemia  -     CK  -     LIPID PANEL    5. On statin therapy  -     CK  -     LIPID PANEL  -     METABOLIC PANEL, COMPREHENSIVE    6. Urinary frequency  -     URINALYSIS W/O MICRO    7. Fatigue, unspecified type  -     CBC WITH AUTOMATED DIFF    8.  Screening for depression  -     DEPRESSION SCREEN ANNUAL    9. Screening for diabetes mellitus    10.  Screening for ischemic heart disease        Health Maintenance Due     Health Maintenance Due   Topic Date Due    COVID-19 Vaccine (1 of 2) 05/01/1961    DTaP/Tdap/Td series (1 - Tdap) 05/01/1966    Shingrix Vaccine Age 50> (1 of 2) 05/01/1995    GLAUCOMA SCREENING Q2Y  05/01/2010    Flu Vaccine (1) 09/01/2020       Patient Care Team   Patient Care Team:  Lai Pham MD as PCP - General (Internal Medicine)  Lai Pham MD as PCP - [facility] Empaneled Provider    History     Patient Active Problem List   Diagnosis Code    Leukocytosis D72.829    Gait difficulty R26.9    Menopausal disorder N95.9    Irritable bowel syndrome with constipation K58.1    Urinary frequency R35.0    Sjogren's syndrome ([facility] [address] [address] M35.00    Disorder of bone and cartilage M89.9, M94.9    Back pain with radiation M54.9    Hip pain, acute, left M25.552    Migraine G43.909    Knee pain M25.569    Hypothyroid E03.9    Pure hypercholesterolemia E78.00    On statin therapy Z79.899     Past Medical History:   Diagnosis Date    Adult onset hypothyroidism 10/19/2017    Arthritis     feet and hands    Back pain with radiation 10/19/2017    Breast cyst 6 months ago     right breast     Disorder of bone and cartilage 10/19/2017    GERD (gastroesophageal reflux disease)     High risk medication use 10/19/2017    Hip pain, acute, left 10/19/2017    hypercholesteremia     elevatd cholesterol    Hyperlipidemia 10/19/2017    Hypothyroid     hypothyroid    Hypothyroid 10/19/2017    Irritable bowel syndrome with constipation 10/19/2017    Knee pain 10/19/2017    Menopausal disorder 10/19/2017    Migraine 10/19/2017    Migraines     Neurological disorder     migraines    On statin therapy 6/18/2019    Urinary frequency 10/19/2017      Past Surgical History:   Procedure Laterality Date    COLONOSCOPY Left 6/1/2018    COLONOSCOPY performed [facility]e Ling [facility] at [facility] ENDOSCOPY    HX BREAST BIOPSY Left 11 years ago     negative    HX CATARACT REMOVAL Left 07/2020    HX GYN      hysterectomy    HX ORTHOPAEDIC  11/9/11    SPINE LUMBAR LAMINECTOMY - L4-5 LAMINECTOMY FOR REMOVAL OF EXTRA DURAL LESION ON THE LEFT - benign    HX OTHER SURGICAL      ganglion cyst removed finger    HX TONSILLECTOMY      [place] ABDOMEN SURGERY PROC UNLISTED  2011    removed \"sludge\" from duct in gallbladder     Current Outpatient Medications   Medication Sig Dispense Refill    levothyroxine (SYNTHROID) 125 mcg tablet TAKE 1 TABLET EVERY DAY FOR THYROID 90 Tab 3    amitriptyline (ELAVIL) 25 mg tablet TAKE 2 TABLETS BY MOUTH IN THE EVENING 180 Tab 3    simvastatin (ZOCOR) 20 mg tablet TAKE 1 TABLET BY MOUTH EVERY DAY 90 Tab 3    omeprazole (PRILOSEC OTC) 20 mg tablet Take 20 mg by mouth daily. Allergies   Allergen Reactions    Latex Rash     Bandages causes a rash if left on for more than a couple of days. She buys latex free band-aids.  Codeine Nausea and Vomiting       Family History   Problem Relation Age of Onset    Heart Disease Mother         \"shrinking heart valve\"     Social History     Tobacco Use    Smoking status: Never Smoker    Smokeless tobacco: Never Used   Substance Use Topics    Alcohol use: Yes     Comment: rarely           Prosper Jaffe is a 76 y.o. female and presents with Thyroid Problem (6 month follow up) and Annual Wellness Visit (medicare wellness )  . Subjective:  Mrs. Isaias Paul presents today for follow-up Medicare wellness review as well as follow-up of hypothyroidism, hyperlipidemia, monitoring statin therapy, and reflux. She is doing well on her current medical regimen. She remains on 25 mcg of levothyroxine daily. She takes simvastatin 20 mg nightly for hyperlipidemia. She also takes amitriptyline 25 mg nightly for mild insomnia. She has no shortness of breath, chest pain, palpitations, PND, orthopnea, or pedal edema.     Past Medical History:   Diagnosis Date    Adult onset hypothyroidism 10/19/2017    Arthritis     feet and hands    Back pain with radiation 10/19/2017    Breast cyst 6 months ago     right breast     Disorder of bone and cartilage 10/19/2017    GERD (gastroesophageal reflux disease)     High risk medication use 10/19/2017    Hip pain, acute, left 10/19/2017    hypercholesteremia     elevatd cholesterol    Hyperlipidemia 10/19/2017    Hypothyroid     hypothyroid    Hypothyroid 10/19/2017    Irritable bowel syndrome with constipation 10/19/2017    Knee pain 10/19/2017    Menopausal disorder 10/19/2017    Migraine 10/19/2017    Migraines     Neurological disorder     migraines    On statin therapy 6/18/2019    Urinary frequency 10/19/2017     Past Surgical History:   Procedure Laterality Date    COLONOSCOPY Left 6/1/2018    COLONOSCOPY performed by Mallory Mora MD at [address] HX BREAST BIOPSY Left 11 years ago     negative    HX CATARACT REMOVAL Left 07/2020    HX GYN      hysterectomy    HX ORTHOPAEDIC  11/9/11    SPINE LUMBAR LAMINECTOMY - L4-5 LAMINECTOMY FOR REMOVAL OF EXTRA DURAL LESION ON THE LEFT - benign    HX OTHER SURGICAL      ganglion cyst removed finger    HX TONSILLECTOMY      [place] ABDOMEN SURGERY PROC UNLISTED  2011    removed \"sludge\" from duct in gallbladder     Allergies   Allergen Reactions    Latex Rash     Bandages causes a rash if left on for more than a couple of days. She buys latex free band-aids.  Codeine Nausea and Vomiting     Current Outpatient Medications   Medication Sig Dispense Refill    levothyroxine (SYNTHROID) 125 mcg tablet TAKE 1 TABLET EVERY DAY FOR THYROID 90 Tab 3    amitriptyline (ELAVIL) 25 mg tablet TAKE 2 TABLETS BY MOUTH IN THE EVENING 180 Tab 3    simvastatin (ZOCOR) 20 mg tablet TAKE 1 TABLET BY MOUTH EVERY DAY 90 Tab 3    omeprazole (PRILOSEC OTC) 20 mg tablet Take 20 mg by mouth daily.        Social History     Socioeconomic History  Marital status:      Spouse name: Not on file    Number of children: Not on file    Years of education: Not on file    Highest education level: Not on file   Tobacco Use    Smoking status: Never Smoker    Smokeless tobacco: Never Used   Substance and Sexual Activity    Alcohol use: Yes     Comment: rarely    Drug use: No    Sexual activity: Yes     Partners: Male     Birth control/protection: Surgical     Family History   Problem Relation Age of Onset    Heart Disease Mother         \"shrinking heart valve\"       Review of Systems  Constitutional:  negative for fevers, chills, anorexia and weight loss  Eyes:    negative for visual disturbance and irritation  ENT:    negative for tinnitus,sore throat,nasal congestion,ear pains. hoarseness  Respiratory:     negative for cough, hemoptysis, dyspnea,wheezing  CV:    negative for chest pain, palpitations, lower extremity edema  GI:    negative for nausea, vomiting, diarrhea, abdominal pain,melena  Endo:               negative for polyuria,polydipsia,polyphagia,heat intolerance  Genitourinary : negative for frequency, dysuria and hematuria  Integumentary: negative for rash and pruritus  Hematologic:   negative for easy bruising and gum/nose bleeding  Musculoskel:  negative for myalgias, arthralgias, back pain, muscle weakness, joint pain  Neurological:   negative for headaches, dizziness, vertigo, memory problems and gait   Behavl/Psych:  negative for feelings of anxiety, depression, mood changes  ROS otherwise negative      Objective:  Visit Vitals  /70 (BP 1 Location: Left upper arm, BP Patient Position: Sitting, BP Cuff Size: Large adult)   Pulse 95   Temp 97.9 °F (36.6 °C) (Temporal)   Resp 16   Ht 5' 2\" (1.575 m)   Wt 167 lb 9.6 oz (76 kg)   LMP  (LMP Unknown)   SpO2 99%   BMI 30.65 kg/m²     Physical Exam:   General appearance - alert, well appearing, and in no distress  Mental status - alert, oriented to person, place, and time  EYE-NIEVES, EOMI, fundi normal, corneas normal, no foreign bodies  ENT-ENT exam normal, no neck nodes or sinus tenderness  Nose - normal and patent, no erythema, discharge or polyps  Mouth - mucous membranes moist, pharynx normal without lesions  Neck - supple, no significant adenopathy   Chest - clear to auscultation, no wheezes, rales or rhonchi, symmetric air entry   Heart - normal rate, regular rhythm, normal S1, S2, no murmurs, rubs, clicks or gallops   Abdomen - soft, nontender, nondistended, no masses or organomegaly  Lymph- no adenopathy palpable  Ext-peripheral pulses normal, no pedal edema, no clubbing or cyanosis  Skin-Warm and dry. no hyperpigmentation, vitiligo, or suspicious lesions  Neuro -alert, oriented, normal speech, no focal findings or movement disorder noted      Assessment/Plan:  Diagnoses and all orders for this visit:    1. Medicare annual wellness visit, subsequent    2. Irritable bowel syndrome with constipation    3. Acquired hypothyroidism  -     TSH 3RD GENERATION  -     T4, FREE    4. Pure hypercholesterolemia  -     CK  -     LIPID PANEL    5. On statin therapy  -     CK  -     LIPID PANEL  -     METABOLIC PANEL, COMPREHENSIVE    6. Urinary frequency  -     URINALYSIS W/O MICRO    7. Fatigue, unspecified type  -     CBC WITH AUTOMATED DIFF    8. Screening for depression  -     DEPRESSION SCREEN ANNUAL    9. Screening for diabetes mellitus    10. Screening for ischemic heart disease          ICD-10-CM ICD-9-CM    1. Medicare annual wellness visit, subsequent  Z00.00 V70.0    2. Irritable bowel syndrome with constipation  K58.1 564.1    3. Acquired hypothyroidism  E03.9 244.9 TSH 3RD GENERATION      T4, FREE   4. Pure hypercholesterolemia  E78.00 272.0 CK      LIPID PANEL   5. On statin therapy  Z79.899 V58.69 CK      LIPID PANEL      METABOLIC PANEL, COMPREHENSIVE   6. Urinary frequency  R35.0 788.41 URINALYSIS W/O MICRO   7. Fatigue, unspecified type  R53.83 780.79 CBC WITH AUTOMATED DIFF   8. Screening for depression  Z13.31 V79.0 [place] [id number]   9. Screening for diabetes mellitus  Z13.1 V77.1    10. Screening for ischemic heart disease  Z13.6 V81.0      Plan:    Continue current medical regimen as outlined above. Further recommendations based on labs as ordered. Patient is already received 1 of 2 doses of her Covid 19 vaccine and is anticipating getting the second dose within the next couple of weeks. I have reviewed with the patient details of the assessment and plan and all questions were answered. Relevent patient education was performed. Verbal and/or written instructions (see AVS) provided. The most recent lab findings were reviewed with the patient. Plan was discussed with patient who verbally expressed understanding. An After Visit Summary was printed and given to the patient.     Yg Gilbert MD

## 2021-02-16 NOTE — PROGRESS NOTES
Lab results overall look great. Although your TSH is just below normal range your free T4 is normal.  Your cholesterol profile is excellent. Your glucose is normal.  Your liver and kidney function are normal.  Your complete blood count is normal.  There is trace urinary sediment which is not significant.

## 2021-06-15 ENCOUNTER — TRANSCRIBE ORDER (OUTPATIENT)
Dept: SCHEDULING | Age: 76
End: 2021-06-15

## 2021-06-15 DIAGNOSIS — Z12.31 SCREENING MAMMOGRAM FOR HIGH-RISK PATIENT: Primary | ICD-10-CM

## 2021-06-23 ENCOUNTER — HOSPITAL ENCOUNTER (OUTPATIENT)
Dept: MAMMOGRAPHY | Age: 76
Discharge: HOME | End: 2021-06-23
Attending: INTERNAL MEDICINE
Payer: MEDICARE

## 2021-06-23 DIAGNOSIS — Z12.31 SCREENING MAMMOGRAM FOR HIGH-RISK PATIENT: ICD-10-CM

## 2021-06-23 PROCEDURE — 77067 SCR MAMMO BI INCL CAD: CPT

## 2021-08-16 ENCOUNTER — OFFICE VISIT (OUTPATIENT)
Dept: INTERNAL MEDICINE CLINIC | Age: 76
End: 2021-08-16
Payer: MEDICARE

## 2021-08-16 VITALS
DIASTOLIC BLOOD PRESSURE: 84 MMHG | RESPIRATION RATE: 18 BRPM | SYSTOLIC BLOOD PRESSURE: 124 MMHG | BODY MASS INDEX: 30.55 KG/M2 | HEIGHT: 62 IN | HEART RATE: 83 BPM | WEIGHT: 166 LBS | OXYGEN SATURATION: 96 %

## 2021-08-16 DIAGNOSIS — M35.00 SJOGREN'S SYNDROME, WITH UNSPECIFIED ORGAN INVOLVEMENT (HCC): ICD-10-CM

## 2021-08-16 DIAGNOSIS — Z79.899 ON STATIN THERAPY: ICD-10-CM

## 2021-08-16 DIAGNOSIS — E78.00 PURE HYPERCHOLESTEROLEMIA: ICD-10-CM

## 2021-08-16 DIAGNOSIS — E03.9 ACQUIRED HYPOTHYROIDISM: Primary | ICD-10-CM

## 2021-08-16 DIAGNOSIS — K21.9 GASTROESOPHAGEAL REFLUX DISEASE WITHOUT ESOPHAGITIS: ICD-10-CM

## 2021-08-16 LAB
ALBUMIN SERPL-MCNC: 3.8 G/DL (ref 3.5–5)
ALBUMIN/GLOB SERPL: 1.2 {RATIO} (ref 1.1–2.2)
ALP SERPL-CCNC: 90 U/L (ref 45–117)
ALT SERPL-CCNC: 21 U/L (ref 12–78)
ANION GAP SERPL CALC-SCNC: 6 MMOL/L (ref 5–15)
AST SERPL-CCNC: 14 U/L (ref 15–37)
BILIRUB SERPL-MCNC: 0.4 MG/DL (ref 0.2–1)
BUN SERPL-MCNC: 14 MG/DL (ref 6–20)
BUN/CREAT SERPL: 18 (ref 12–20)
CALCIUM SERPL-MCNC: 9.5 MG/DL (ref 8.5–10.1)
CHLORIDE SERPL-SCNC: 104 MMOL/L (ref 97–108)
CHOLEST SERPL-MCNC: 198 MG/DL
CK SERPL-CCNC: 48 U/L (ref 26–192)
CO2 SERPL-SCNC: 28 MMOL/L (ref 21–32)
CREAT SERPL-MCNC: 0.77 MG/DL (ref 0.55–1.02)
GLOBULIN SER CALC-MCNC: 3.1 G/DL (ref 2–4)
GLUCOSE SERPL-MCNC: 91 MG/DL (ref 65–100)
HDLC SERPL-MCNC: 72 MG/DL
HDLC SERPL: 2.8 {RATIO} (ref 0–5)
LDLC SERPL CALC-MCNC: 107 MG/DL (ref 0–100)
POTASSIUM SERPL-SCNC: 4.4 MMOL/L (ref 3.5–5.1)
PROT SERPL-MCNC: 6.9 G/DL (ref 6.4–8.2)
SODIUM SERPL-SCNC: 138 MMOL/L (ref 136–145)
T4 FREE SERPL-MCNC: 1.4 NG/DL (ref 0.8–1.5)
TRIGL SERPL-MCNC: 95 MG/DL (ref ?–150)
TSH SERPL DL<=0.05 MIU/L-ACNC: 0.28 UIU/ML (ref 0.36–3.74)
VLDLC SERPL CALC-MCNC: 19 MG/DL

## 2021-08-16 PROCEDURE — G8536 NO DOC ELDER MAL SCRN: HCPCS | Performed by: INTERNAL MEDICINE

## 2021-08-16 PROCEDURE — G8427 DOCREV CUR MEDS BY ELIG CLIN: HCPCS | Performed by: INTERNAL MEDICINE

## 2021-08-16 PROCEDURE — 1090F PRES/ABSN URINE INCON ASSESS: CPT | Performed by: INTERNAL MEDICINE

## 2021-08-16 PROCEDURE — G8432 DEP SCR NOT DOC, RNG: HCPCS | Performed by: INTERNAL MEDICINE

## 2021-08-16 PROCEDURE — 99214 OFFICE O/P EST MOD 30 MIN: CPT | Performed by: INTERNAL MEDICINE

## 2021-08-16 PROCEDURE — G8417 CALC BMI ABV UP PARAM F/U: HCPCS | Performed by: INTERNAL MEDICINE

## 2021-08-16 PROCEDURE — G8399 PT W/DXA RESULTS DOCUMENT: HCPCS | Performed by: INTERNAL MEDICINE

## 2021-08-16 PROCEDURE — 1101F PT FALLS ASSESS-DOCD LE1/YR: CPT | Performed by: INTERNAL MEDICINE

## 2021-08-16 NOTE — PROGRESS NOTES
This note will not be viewable in [address] Radha Drake is a 68 y.o. female and presents with Follow-up (routine 6 month f/u)  . Subjective:  Mrs. Barbie Buck presents today for 6-month follow-up of hyperlipidemia, monitoring statin therapy, reflux, and hypothyroidism. She is doing well on her current medical regimen. She denies any side effects with her medications. Her last TSH was just below normal range with a normal free T4. She has not had any side effects on this dose of her medication and we have not change the dosing. She has no shortness of breath, chest pain, palpitations, PND, orthopnea, or pedal edema.     Past Medical History:   Diagnosis Date    Adult onset hypothyroidism 10/19/2017    Arthritis     feet and hands    Back pain with radiation 10/19/2017    Breast cyst 6 months ago     right breast     Disorder of bone and cartilage 10/19/2017    GERD (gastroesophageal reflux disease)     High risk medication use 10/19/2017    Hip pain, acute, left 10/19/2017    hypercholesteremia     elevatd cholesterol    Hyperlipidemia 10/19/2017    Hypothyroid     hypothyroid    Hypothyroid 10/19/2017    Irritable bowel syndrome with constipation 10/19/2017    Knee pain 10/19/2017    Menopausal disorder 10/19/2017    Menopause     Migraine 10/19/2017    Migraines     Neurological disorder     migraines    On statin therapy 6/18/2019    Urinary frequency 10/19/2017     Past Surgical History:   Procedure Laterality Date    COLONOSCOPY Left 6/1/2018    COLONOSCOPY performed by Jose Fabian MD at [address] HX BREAST BIOPSY Left 11 years ago     negative    HX CATARACT REMOVAL Left 07/2020    HX GYN      hysterectomy    HX HYSTERECTOMY      HX OOPHORECTOMY      HX ORTHOPAEDIC  11/9/11    SPINE LUMBAR LAMINECTOMY - L4-5 LAMINECTOMY FOR REMOVAL OF EXTRA DURAL LESION ON THE LEFT - benign    HX OTHER SURGICAL      ganglion cyst removed finger    HX TONSILLECTOMY      SD ABDOMEN SURGERY PROC UNLISTED  2011    removed \"sludge\" from duct in gallbladder     Allergies   Allergen Reactions    Latex Rash     Bandages causes a rash if left on for more than a couple of days. She buys latex free band-aids.  Codeine Nausea and Vomiting     Current Outpatient Medications   Medication Sig Dispense Refill    levothyroxine (SYNTHROID) 125 mcg tablet TAKE 1 TABLET EVERY DAY FOR THYROID 90 Tab 3    amitriptyline (ELAVIL) 25 mg tablet TAKE 2 TABLETS BY MOUTH IN THE EVENING 180 Tab 3    simvastatin (ZOCOR) 20 mg tablet TAKE 1 TABLET BY MOUTH EVERY DAY 90 Tab 3    omeprazole (PRILOSEC OTC) 20 mg tablet Take 20 mg by mouth daily. Social History     Socioeconomic History    Marital status:      Spouse name: Not on file    Number of children: Not on file    Years of education: Not on file    Highest education level: Not on file   Tobacco Use    Smoking status: Never Smoker    Smokeless tobacco: Never Used   Substance and Sexual Activity    Alcohol use: Yes     Comment: rarely    Drug use: No    Sexual activity: Yes     Partners: Male     Birth control/protection: Surgical     Social Determinants of Health     Financial Resource Strain:     Difficulty of Paying Living Expenses:    Food Insecurity:     Worried About Running Out of Food in the Last Year:     [address] in the Last Year:    Transportation Needs:     Lack of Transportation (Medical):      Lack of Transportation (Non-Medical):    Physical Activity:     Days of Exercise per Week:     Minutes of Exercise per Session:    Stress:     Feeling of Stress :    Social Connections:     Frequency of Communication with Friends and Family:     Frequency of Social Gatherings with Friends and Family:     Attends Shinto Services:     Active Member of Clubs or Organizations:     Attends Club or Organization Meetings:     Marital Status:      Family History   Problem Relation Age of Onset    Heart Disease Mother         \"shrinking heart valve\"       Review of Systems  Constitutional:  negative for fevers, chills, anorexia and weight loss  Eyes:    negative for visual disturbance and irritation  ENT:    negative for tinnitus,sore throat,nasal congestion,ear pains. hoarseness  Respiratory:     negative for cough, hemoptysis, dyspnea,wheezing  CV:    negative for chest pain, palpitations, lower extremity edema  GI:    negative for nausea, vomiting, diarrhea, abdominal pain,melena  Endo:               negative for polyuria,polydipsia,polyphagia,heat intolerance  Genitourinary : negative for frequency, dysuria and hematuria  Integumentary: negative for rash and pruritus  Hematologic:   negative for easy bruising and gum/nose bleeding  Musculoskel:  negative for myalgias, arthralgias, back pain, muscle weakness, joint pain  Neurological:   negative for headaches, dizziness, vertigo, memory problems and gait   Behavl/Psych:  negative for feelings of anxiety, depression, mood changes  ROS otherwise negative      Objective:  Visit Vitals  /84 (BP 1 Location: Right arm, BP Patient Position: Sitting, BP Cuff Size: Large adult)   Pulse 83   Resp 18   Ht 5' 2\" (1.575 m)   Wt 166 lb (75.3 kg)   LMP  (LMP Unknown)   SpO2 96%   BMI 30.36 kg/m²     Physical Exam:   General appearance - alert, well appearing, and in no distress  Mental status - alert, oriented to person, place, and time  EYE-NIEVES, EOMI, fundi normal, corneas normal, no foreign bodies  ENT-ENT exam normal, no neck nodes or sinus tenderness  Nose - normal and patent, no erythema, discharge or polyps  Mouth - mucous membranes moist, pharynx normal without lesions  Neck - supple, no significant adenopathy   Chest - clear to auscultation, no wheezes, rales or rhonchi, symmetric air entry   Heart - normal rate, regular rhythm, normal S1, S2, no murmurs, rubs, clicks or gallops   Abdomen - soft, nontender, nondistended, no masses or organomegaly  Lymph- no adenopathy palpable  Ext-peripheral pulses normal, no pedal edema, no clubbing or cyanosis  Skin-Warm and dry. no hyperpigmentation, vitiligo, or suspicious lesions  Neuro -alert, oriented, normal speech, no focal findings or movement disorder noted      Assessment/Plan:  Diagnoses and all orders for this visit:    1. Acquired hypothyroidism  -     TSH 3RD GENERATION; Future  -     T4, FREE; Future    2. Sjogren's syndrome, with unspecified organ involvement ([facility] [address]    3. Pure hypercholesterolemia  -     CK; Future  -     LIPID PANEL; Future    4. On statin therapy  -     CK; Future  -     LIPID PANEL; Future  -     METABOLIC PANEL, COMPREHENSIVE; Future    5. Gastroesophageal reflux disease without esophagitis          ICD-10-CM ICD-9-CM    1. Acquired hypothyroidism  E03.9 244.9 TSH 3RD GENERATION      T4, FREE   2. Sjogren's syndrome, with unspecified organ involvement ([facility]  M35.00 710.2    3. Pure hypercholesterolemia  E78.00 272.0 CK      LIPID PANEL   4. On statin therapy  Z79.899 V58.69 CK      LIPID PANEL      METABOLIC PANEL, COMPREHENSIVE   5. Gastroesophageal reflux disease without esophagitis  K21.9 530.81      Plan:    Continue current medical regimen as outlined above. Further recommendations based on labs as ordered. I have reviewed with the patient details of the assessment and plan and all questions were answered. Relevent patient education was performed. Verbal and/or written instructions (see AVS) provided. The most recent lab findings were reviewed with the patient. Plan was discussed with patient who verbally expressed understanding. An After Visit Summary was printed and given to the patient.     Bam Chow MD

## 2021-08-16 NOTE — PROGRESS NOTES
1. Have you been to the ER, urgent care clinic since your last visit? Hospitalized since your last visit? No    2. Have you seen or consulted any other health care providers outside of the [address] [place] [address] since your last visit? Include any pap smears or colon screening.  No

## 2021-08-17 NOTE — PROGRESS NOTES
Labs look great overall. Your glucose is normal.  Your liver and kidney function are normal.  Your cholesterol profile looks good. Your LDL cholesterol has increased slightly but we will monitor this. Your thyroid function is stable.

## 2021-12-15 RX ORDER — SIMVASTATIN 20 MG/1
TABLET, FILM COATED ORAL
Qty: 90 TABLET | Refills: 3 | Status: SHIPPED | OUTPATIENT
Start: 2021-12-15

## 2022-01-06 RX ORDER — AMITRIPTYLINE HYDROCHLORIDE 25 MG/1
TABLET, FILM COATED ORAL
Qty: 180 TABLET | Refills: 3 | Status: SHIPPED | OUTPATIENT
Start: 2022-01-06 | End: 2022-08-16 | Stop reason: ALTCHOICE

## 2022-01-21 RX ORDER — LEVOTHYROXINE SODIUM 125 UG/1
TABLET ORAL
Qty: 90 TABLET | Refills: 3 | Status: SHIPPED | OUTPATIENT
Start: 2022-01-21 | End: 2022-08-17 | Stop reason: DRUGHIGH

## 2022-02-14 ENCOUNTER — OFFICE VISIT (OUTPATIENT)
Dept: INTERNAL MEDICINE CLINIC | Age: 77
End: 2022-02-14
Payer: MEDICARE

## 2022-02-14 VITALS
HEART RATE: 88 BPM | BODY MASS INDEX: 31.28 KG/M2 | OXYGEN SATURATION: 99 % | DIASTOLIC BLOOD PRESSURE: 98 MMHG | TEMPERATURE: 98.1 F | SYSTOLIC BLOOD PRESSURE: 142 MMHG | WEIGHT: 170 LBS | RESPIRATION RATE: 16 BRPM | HEIGHT: 62 IN

## 2022-02-14 DIAGNOSIS — Z79.899 ON STATIN THERAPY: ICD-10-CM

## 2022-02-14 DIAGNOSIS — Z13.6 SCREENING FOR ISCHEMIC HEART DISEASE: ICD-10-CM

## 2022-02-14 DIAGNOSIS — M35.00 SJOGREN'S SYNDROME, WITH UNSPECIFIED ORGAN INVOLVEMENT (HCC): ICD-10-CM

## 2022-02-14 DIAGNOSIS — E55.9 VITAMIN D DEFICIENCY: ICD-10-CM

## 2022-02-14 DIAGNOSIS — Z13.1 SCREENING FOR DIABETES MELLITUS: ICD-10-CM

## 2022-02-14 DIAGNOSIS — Z00.00 MEDICARE ANNUAL WELLNESS VISIT, SUBSEQUENT: Primary | ICD-10-CM

## 2022-02-14 DIAGNOSIS — K21.9 GASTROESOPHAGEAL REFLUX DISEASE WITHOUT ESOPHAGITIS: ICD-10-CM

## 2022-02-14 DIAGNOSIS — E78.00 PURE HYPERCHOLESTEROLEMIA: ICD-10-CM

## 2022-02-14 DIAGNOSIS — E03.9 ACQUIRED HYPOTHYROIDISM: ICD-10-CM

## 2022-02-14 DIAGNOSIS — Z13.31 SCREENING FOR DEPRESSION: ICD-10-CM

## 2022-02-14 DIAGNOSIS — R35.0 URINARY FREQUENCY: ICD-10-CM

## 2022-02-14 DIAGNOSIS — F41.8 SITUATIONAL ANXIETY: ICD-10-CM

## 2022-02-14 PROCEDURE — G8417 CALC BMI ABV UP PARAM F/U: HCPCS | Performed by: INTERNAL MEDICINE

## 2022-02-14 PROCEDURE — G8510 SCR DEP NEG, NO PLAN REQD: HCPCS | Performed by: INTERNAL MEDICINE

## 2022-02-14 PROCEDURE — 1101F PT FALLS ASSESS-DOCD LE1/YR: CPT | Performed by: INTERNAL MEDICINE

## 2022-02-14 PROCEDURE — G8536 NO DOC ELDER MAL SCRN: HCPCS | Performed by: INTERNAL MEDICINE

## 2022-02-14 PROCEDURE — G8399 PT W/DXA RESULTS DOCUMENT: HCPCS | Performed by: INTERNAL MEDICINE

## 2022-02-14 PROCEDURE — G8427 DOCREV CUR MEDS BY ELIG CLIN: HCPCS | Performed by: INTERNAL MEDICINE

## 2022-02-14 PROCEDURE — G0439 PPPS, SUBSEQ VISIT: HCPCS | Performed by: INTERNAL MEDICINE

## 2022-02-14 NOTE — PROGRESS NOTES
Ede Jackson presents today at the clinic for    Chief Complaint   Patient presents with    Cholesterol Problem     follow up    Hypothyroidism     follow up    GERD     follow up        Wt Readings from Last 3 Encounters:   02/14/22 170 lb (77.1 kg)   08/16/21 166 lb (75.3 kg)   02/05/21 167 lb 9.6 oz (76 kg)     Temp Readings from Last 3 Encounters:   02/14/22 98.1 °F (36.7 °C) (Oral)   02/05/21 97.9 °F (36.6 °C) (Temporal)   07/31/20 97.5 °F (36.4 °C) (Oral)     BP Readings from Last 3 Encounters:   02/14/22 (!) 142/98   08/16/21 124/84   02/05/21 118/70     Pulse Readings from Last 3 Encounters:   02/14/22 88   08/16/21 83   02/05/21 95       Health Maintenance Due   Topic    DTaP/Tdap/Td series (1 - Tdap)    Shingrix Vaccine Age 50> (1 of 2)    Flu Vaccine (1)    Depression Screen     Medicare Yearly Exam          Learning Assessment:  :     Learning Assessment 8/17/2018 2/13/2018 8/14/2017   PRIMARY LEARNER Patient Patient Patient   PRIMARY LANGUAGE ENGLISH ENGLISH ENGLISH   LEARNER PREFERENCE PRIMARY DEMONSTRATION DEMONSTRATION DEMONSTRATION   ANSWERED BY patient patient patient   RELATIONSHIP SELF SELF SELF       Depression Screening:  :     3 most recent PHQ Screens 2/14/2022   Little interest or pleasure in doing things Not at all   Feeling down, depressed, irritable, or hopeless Not at all   Total Score PHQ 2 0       Fall Risk Assessment:  :     Fall Risk Assessment, last 12 mths 2/14/2022   Able to walk? Yes   Fall in past 12 months? 0   Do you feel unsteady? 0   Are you worried about falling 0   Number of falls in past 12 months -   Fall with injury? -       Abuse Screening:  :     Abuse Screening Questionnaire 2/14/2022 2/5/2021 1/31/2020   Do you ever feel afraid of your partner? N N N   Are you in a relationship with someone who physically or mentally threatens you? N N N   Is it safe for you to go home?  Mazie Barthel       Coordination of Care Questionnaire:  :     1. Have you been to the ER, urgent care clinic since your last visit? Hospitalized since your last visit? no    2. Have you seen or consulted any other health care providers outside of the [address] [place] [address] since your last visit? Include any pap smears or colon screening.  no

## 2022-02-14 NOTE — PATIENT INSTRUCTIONS

## 2022-02-14 NOTE — PROGRESS NOTES
This is the Subsequent Medicare Annual Wellness Exam, performed 12 months or more after the Initial AWV or the last Subsequent AWV    I have reviewed the patient's medical history in detail and updated the computerized patient record. Assessment/Plan   Education and counseling provided:  Are appropriate based on today's review and evaluation    1. Medicare annual wellness visit, subsequent  2. Acquired hypothyroidism  -     TSH 3RD GENERATION; Future  -     T4, FREE; Future  3. Sjogren's syndrome, with unspecified organ involvement ([address] [facility] [address]  4. Gastroesophageal reflux disease without esophagitis  5. On statin therapy  -     CK; Future  -     LIPID PANEL; Future  -     METABOLIC PANEL, COMPREHENSIVE; Future  6. Pure hypercholesterolemia  -     CK; Future  -     LIPID PANEL; Future  -     METABOLIC PANEL, COMPREHENSIVE; Future  7. Urinary frequency  -     URINALYSIS W/ RFLX MICROSCOPIC; Future  8. Vitamin D deficiency  -     VITAMIN D, 25 HYDROXY; Future  9. Situational anxiety  10. Body mass index 31.0-31.9, adult  11. Screening for diabetes mellitus  12. Screening for ischemic heart disease  13. Screening for depression  -     DEPRESSION SCREEN ANNUAL       Depression Risk Factor Screening     3 most recent PHQ Screens 2/14/2022   Little interest or pleasure in doing things Not at all   Feeling down, depressed, irritable, or hopeless Not at all   Total Score PHQ 2 0       Alcohol & Drug Abuse Risk Screen    Do you average more than 1 drink per night or more than 7 drinks a week:  No    On any one occasion in the past three months have you have had more than 3 drinks containing alcohol:  No          Functional Ability and Level of Safety    Hearing: Hearing is good. Activities of Daily Living: The home contains: no safety equipment. Patient does total self care      Ambulation: with no difficulty     Fall Risk:  Fall Risk Assessment, last 12 mths 2/14/2022   Able to walk?  Yes   Fall in past 12 months? 0 Do you feel unsteady? 0   Are you worried about falling 0   Number of falls in past 12 months -   Fall with injury?  -      Abuse Screen:  Patient is not abused       Cognitive Screening    Has your family/caregiver stated any concerns about your memory: no     Cognitive Screening: Normal - Verbal Fluency Test    Health Maintenance Due     Health Maintenance Due   Topic Date Due    DTaP/Tdap/Td series (1 - Tdap) Never done    Shingrix Vaccine Age 50> (1 of 2) Never done    Flu Vaccine (1) 09/01/2021    Depression Screen  02/05/2022       Patient Care Team   Patient Care Team:  Alton Osborn MD as PCP - General (Internal Medicine)  Alton Osborn MD as PCP - [facility] Empaneled Provider    History     Patient Active Problem List   Diagnosis Code    Leukocytosis D72.829    Gait difficulty R26.9    Menopausal disorder N95.9    Irritable bowel syndrome with constipation K58.1    Urinary frequency R35.0    Sjogren's syndrome ([address] M35.00    Disorder of bone and cartilage M89.9, M94.9    Back pain with radiation M54.9    Hip pain, acute, left M25.552    Migraine G43.909    Knee pain M25.569    Hypothyroid E03.9    Pure hypercholesterolemia E78.00    On statin therapy Z79.899    GERD (gastroesophageal reflux disease) K21.9     Past Medical History:   Diagnosis Date    Adult onset hypothyroidism 10/19/2017    Arthritis     feet and hands    Back pain with radiation 10/19/2017    Breast cyst 6 months ago     right breast     Disorder of bone and cartilage 10/19/2017    GERD (gastroesophageal reflux disease)     High risk medication use 10/19/2017    Hip pain, acute, left 10/19/2017    hypercholesteremia     elevatd cholesterol    Hyperlipidemia 10/19/2017    Hypothyroid     hypothyroid    Hypothyroid 10/19/2017    Irritable bowel syndrome with constipation 10/19/2017    Knee pain 10/19/2017    Menopausal disorder 10/19/2017    Menopause     Migraine 10/19/2017    Migraines     Neurological disorder     migraines    On statin therapy 6/18/2019    Urinary frequency 10/19/2017      Past Surgical History:   Procedure Laterality Date    COLONOSCOPY Left 6/1/2018    COLONOSCOPY performed by Tiburcio Gillespie [facility] at [facility] ENDOSCOPY    HX BREAST BIOPSY Left 11 years ago     negative    HX CATARACT REMOVAL Left 07/2020    HX GYN      hysterectomy    HX HYSTERECTOMY      HX OOPHORECTOMY      HX ORTHOPAEDIC  11/9/11    SPINE LUMBAR LAMINECTOMY - L4-5 LAMINECTOMY FOR REMOVAL OF EXTRA DURAL LESION ON THE LEFT - benign    HX OTHER SURGICAL      ganglion cyst removed finger    HX TONSILLECTOMY      [place] ABDOMEN SURGERY PROC UNLISTED  2011    removed \"sludge\" from duct in gallbladder     Current Outpatient Medications   Medication Sig Dispense Refill    levothyroxine (SYNTHROID) 125 mcg tablet TAKE 1 TABLET EVERY DAY FOR THYROID 90 Tablet 3    amitriptyline (ELAVIL) 25 mg tablet TAKE 2 TABLETS BY MOUTH IN THE EVENING 180 Tablet 3    simvastatin (ZOCOR) 20 mg tablet TAKE 1 TABLET BY MOUTH EVERY DAY 90 Tablet 3    omeprazole (PRILOSEC OTC) 20 mg tablet Take 20 mg by mouth daily. Allergies   Allergen Reactions    Latex Rash     Bandages causes a rash if left on for more than a couple of days. She buys latex free band-aids.  Codeine Nausea and Vomiting       Family History   Problem Relation Age of Onset    Heart Disease Mother         \"shrinking heart valve\"     Social History     Tobacco Use    Smoking status: Never Smoker    Smokeless tobacco: Never Used   Substance Use Topics    Alcohol use: Yes     Comment: rarely         Jesus Subramanian MD         [facility] is a 68 y.o. female and presents with Cholesterol Problem (follow up), Hypothyroidism (follow up), and GERD (follow up)  . Subjective:  Mrs. Sindy Junior presents today for Medicare annual wellness review and follow-up of several medical problems including hyperlipidemia, monitoring statin therapy, reflux, hypothyroidism. She is doing well on her current medical regimen. She has had some situational anxiety related to some issues that her grandson has been going through recently. She has had some right knee discomfort intermittently. She describes it as a shooting pain that comes and goes. She denies any trauma or injury. She does water aerobics regularly which does seem to help. She has no shortness of breath, chest pain, palpitations, PND, orthopnea, or pedal edema.     Past Medical History:   Diagnosis Date    Adult onset hypothyroidism 10/19/2017    Arthritis     feet and hands    Back pain with radiation 10/19/2017    Breast cyst 6 months ago     right breast     Disorder of bone and cartilage 10/19/2017    GERD (gastroesophageal reflux disease)     High risk medication use 10/19/2017    Hip pain, acute, left 10/19/2017    hypercholesteremia     elevatd cholesterol    Hyperlipidemia 10/19/2017    Hypothyroid     hypothyroid    Hypothyroid 10/19/2017    Irritable bowel syndrome with constipation 10/19/2017    Knee pain 10/19/2017    Menopausal disorder 10/19/2017    Menopause     Migraine 10/19/2017    Migraines     Neurological disorder     migraines    On statin therapy 6/18/2019    Urinary frequency 10/19/2017     Past Surgical History:   Procedure Laterality Date    COLONOSCOPY Left 6/1/2018    COLONOSCOPY performed by Manuel Mon MD at [address] HX BREAST BIOPSY Left 11 years ago     negative    HX CATARACT REMOVAL Left 07/2020    HX GYN      hysterectomy    HX HYSTERECTOMY      HX OOPHORECTOMY      HX ORTHOPAEDIC  11/9/11    SPINE LUMBAR LAMINECTOMY - L4-5 LAMINECTOMY FOR REMOVAL OF EXTRA DURAL LESION ON THE LEFT - benign    HX OTHER SURGICAL      ganglion cyst removed finger    HX TONSILLECTOMY      [place] ABDOMEN SURGERY PROC UNLISTED  2011    removed \"sludge\" from duct in gallbladder     Allergies   Allergen Reactions    Latex Rash     Bandages causes a rash if left on for more than a couple of days. She buys latex free band-aids.  Codeine Nausea and Vomiting     Current Outpatient Medications   Medication Sig Dispense Refill    levothyroxine (SYNTHROID) 125 mcg tablet TAKE 1 TABLET EVERY DAY FOR THYROID 90 Tablet 3    amitriptyline (ELAVIL) 25 mg tablet TAKE 2 TABLETS BY MOUTH IN THE EVENING 180 Tablet 3    simvastatin (ZOCOR) 20 mg tablet TAKE 1 TABLET BY MOUTH EVERY DAY 90 Tablet 3    omeprazole (PRILOSEC OTC) 20 mg tablet Take 20 mg by mouth daily. Social History     Socioeconomic History    Marital status:    Tobacco Use    Smoking status: Never Smoker    Smokeless tobacco: Never Used   Substance and Sexual Activity    Alcohol use: Yes     Comment: rarely    Drug use: No    Sexual activity: Yes     Partners: Male     Birth control/protection: Surgical     Family History   Problem Relation Age of Onset    Heart Disease Mother         \"shrinking heart valve\"       Review of Systems  Constitutional:  negative for fevers, chills, anorexia and weight loss  Eyes:    negative for visual disturbance and irritation  ENT:    negative for tinnitus,sore throat,nasal congestion,ear pains. hoarseness  Respiratory:     negative for cough, hemoptysis, dyspnea,wheezing  CV:    negative for chest pain, palpitations, lower extremity edema  GI:    negative for nausea, vomiting, diarrhea, abdominal pain,melena  Endo:               negative for polyuria,polydipsia,polyphagia,heat intolerance  Genitourinary : negative for frequency, dysuria and hematuria  Integumentary: negative for rash and pruritus  Hematologic:   negative for easy bruising and gum/nose bleeding  Musculoskel:  negative for myalgias,  back pain, muscle weakness  Neurological:   negative for headaches, dizziness, vertigo, memory problems and gait   Behavl/Psych:  negative for feelings of depression, mood changes  ROS otherwise negative      Objective:  Visit Vitals  BP (!) 142/98 (BP 1 Location: Left upper arm, BP Patient Position: Sitting, BP Cuff Size: Large adult)   Pulse 88   Temp 98.1 °F (36.7 °C) (Oral)   Resp 16   Ht 5' 2\" (1.575 m)   Wt 170 lb (77.1 kg)   LMP  (LMP Unknown)   SpO2 99%   BMI 31.09 kg/m²     Physical Exam:   General appearance - alert, well appearing, and in no distress  Mental status - alert, oriented to person, place, and time  EYE-NIEVES, EOMI, fundi normal, corneas normal, no foreign bodies  ENT-ENT exam normal, no neck nodes or sinus tenderness  Nose - normal and patent, no erythema, discharge or polyps  Mouth - mucous membranes moist, pharynx normal without lesions  Neck - supple, no significant adenopathy   Chest - clear to auscultation, no wheezes, rales or rhonchi, symmetric air entry   Heart - normal rate, regular rhythm, normal S1, S2, no murmurs, rubs, clicks or gallops   Abdomen - soft, nontender, nondistended, no masses or organomegaly  Lymph- no adenopathy palpable  Ext-peripheral pulses normal, no pedal edema, no clubbing or cyanosis  Skin-Warm and dry. no hyperpigmentation, vitiligo, or suspicious lesions  Neuro -alert, oriented, normal speech, no focal findings or movement disorder noted      Assessment/Plan:  Diagnoses and all orders for this visit:    1. Medicare annual wellness visit, subsequent    2. Acquired hypothyroidism  -     TSH 3RD GENERATION; Future  -     T4, FREE; Future    3. Sjogren's syndrome, with unspecified organ involvement ([facility] [address]    4. Gastroesophageal reflux disease without esophagitis    5. On statin therapy  -     CK; Future  -     LIPID PANEL; Future  -     METABOLIC PANEL, COMPREHENSIVE; Future    6. Pure hypercholesterolemia  -     CK; Future  -     LIPID PANEL; Future  -     METABOLIC PANEL, COMPREHENSIVE; Future    7. Urinary frequency  -     URINALYSIS W/ RFLX MICROSCOPIC; Future    8. Vitamin D deficiency  -     VITAMIN D, 25 HYDROXY; Future    9. Situational anxiety    10. Body mass index 31.0-31.9, adult    11.  Screening for diabetes mellitus    12. Screening for ischemic heart disease    13. Screening for depression  -     [address] 68          ICD-10-CM ICD-9-CM    1. Medicare annual wellness visit, subsequent  Z00.00 V70.0    2. Acquired hypothyroidism  E03.9 244.9 TSH 3RD GENERATION      T4, FREE   3. Sjogren's syndrome, with unspecified organ involvement ([facility] [address]  M35.00 710.2    4. Gastroesophageal reflux disease without esophagitis  K21.9 530.81    5. On statin therapy  Z79.899 V58.69 CK      LIPID PANEL      METABOLIC PANEL, COMPREHENSIVE   6. Pure hypercholesterolemia  E78.00 272.0 CK      LIPID PANEL      METABOLIC PANEL, COMPREHENSIVE   7. Urinary frequency  R35.0 788.41 URINALYSIS W/ RFLX MICROSCOPIC   8. Vitamin D deficiency  E55.9 268.9 VITAMIN D, 25 HYDROXY   9. Situational anxiety  F41.8 300.09    10. Body mass index 31.0-31.9, adult  Z68.31 V85.31    11. Screening for diabetes mellitus  Z13.1 V77.1    12. Screening for ischemic heart disease  Z13.6 V81.0    13. Screening for depression  Z13.31 V79.0 DEPRESSION SCREEN ANNUAL       Plan:    Continue current medical regimen as outlined above. Further recommendations based on labs as ordered. If anxiety worsens we can consider counseling or medication as needed. If right knee pain persist or progresses consider orthopedic referral.  I suspect she may have a torn meniscus that will probably improve or this could be from some underlying arthritis and an x-ray may be indicated if persistent. I have reviewed with the patient details of the assessment and plan and all questions were answered. Relevent patient education was performed. Verbal and/or written instructions (see AVS) provided. The most recent lab findings were reviewed with the patient. Plan was discussed with patient who verbally expressed understanding. An After Visit Summary was printed and given to the patient.     Nader Sheriff MD

## 2022-02-15 LAB
25(OH)D3 SERPL-MCNC: 17.4 NG/ML (ref 30–100)
ALBUMIN SERPL-MCNC: 3.7 G/DL (ref 3.5–5)
ALBUMIN/GLOB SERPL: 1.2 {RATIO} (ref 1.1–2.2)
ALP SERPL-CCNC: 93 U/L (ref 45–117)
ALT SERPL-CCNC: 20 U/L (ref 12–78)
ANION GAP SERPL CALC-SCNC: 3 MMOL/L (ref 5–15)
APPEARANCE UR: CLEAR
AST SERPL-CCNC: 21 U/L (ref 15–37)
BACTERIA URNS QL MICRO: NEGATIVE /HPF
BILIRUB SERPL-MCNC: 0.4 MG/DL (ref 0.2–1)
BILIRUB UR QL: NEGATIVE
BUN SERPL-MCNC: 14 MG/DL (ref 6–20)
BUN/CREAT SERPL: 16 (ref 12–20)
CALCIUM SERPL-MCNC: 9.1 MG/DL (ref 8.5–10.1)
CHLORIDE SERPL-SCNC: 103 MMOL/L (ref 97–108)
CHOLEST SERPL-MCNC: 180 MG/DL
CK SERPL-CCNC: 50 U/L (ref 26–192)
CO2 SERPL-SCNC: 31 MMOL/L (ref 21–32)
COLOR UR: ABNORMAL
CREAT SERPL-MCNC: 0.86 MG/DL (ref 0.55–1.02)
EPITH CASTS URNS QL MICRO: ABNORMAL /LPF
GLOBULIN SER CALC-MCNC: 3.1 G/DL (ref 2–4)
GLUCOSE SERPL-MCNC: 88 MG/DL (ref 65–100)
GLUCOSE UR STRIP.AUTO-MCNC: NEGATIVE MG/DL
HDLC SERPL-MCNC: 59 MG/DL
HDLC SERPL: 3.1 {RATIO} (ref 0–5)
HGB UR QL STRIP: ABNORMAL
HYALINE CASTS URNS QL MICRO: ABNORMAL /LPF (ref 0–5)
KETONES UR QL STRIP.AUTO: NEGATIVE MG/DL
LDLC SERPL CALC-MCNC: 94.6 MG/DL (ref 0–100)
LEUKOCYTE ESTERASE UR QL STRIP.AUTO: NEGATIVE
NITRITE UR QL STRIP.AUTO: NEGATIVE
PH UR STRIP: 5.5 [PH] (ref 5–8)
POTASSIUM SERPL-SCNC: 3.9 MMOL/L (ref 3.5–5.1)
PROT SERPL-MCNC: 6.8 G/DL (ref 6.4–8.2)
PROT UR STRIP-MCNC: NEGATIVE MG/DL
RBC #/AREA URNS HPF: ABNORMAL /HPF (ref 0–5)
SODIUM SERPL-SCNC: 137 MMOL/L (ref 136–145)
SP GR UR REFRACTOMETRY: 1.01 (ref 1–1.03)
T4 FREE SERPL-MCNC: 1.3 NG/DL (ref 0.8–1.5)
TRIGL SERPL-MCNC: 132 MG/DL (ref ?–150)
TSH SERPL DL<=0.05 MIU/L-ACNC: 0.28 UIU/ML (ref 0.36–3.74)
UR CULT HOLD, URHOLD: NORMAL
UROBILINOGEN UR QL STRIP.AUTO: 0.2 EU/DL (ref 0.2–1)
VLDLC SERPL CALC-MCNC: 26.4 MG/DL
WBC URNS QL MICRO: ABNORMAL /HPF (ref 0–4)

## 2022-03-18 PROBLEM — R26.9 GAIT DIFFICULTY: Status: ACTIVE | Noted: 2017-08-14

## 2022-03-19 PROBLEM — G43.909 MIGRAINE: Status: ACTIVE | Noted: 2017-10-19

## 2022-03-19 PROBLEM — Z79.899 ON STATIN THERAPY: Status: ACTIVE | Noted: 2019-06-18

## 2022-03-19 PROBLEM — E03.9 HYPOTHYROID: Status: ACTIVE | Noted: 2017-10-19

## 2022-03-19 PROBLEM — R35.0 URINARY FREQUENCY: Status: ACTIVE | Noted: 2017-10-19

## 2022-03-19 PROBLEM — M35.00 SJOGREN'S SYNDROME (HCC): Status: ACTIVE | Noted: 2017-10-19

## 2022-03-19 PROBLEM — M25.569 KNEE PAIN: Status: ACTIVE | Noted: 2017-10-19

## 2022-03-19 PROBLEM — M54.9 BACK PAIN WITH RADIATION: Status: ACTIVE | Noted: 2017-10-19

## 2022-03-19 PROBLEM — N95.9 MENOPAUSAL DISORDER: Status: ACTIVE | Noted: 2017-10-19

## 2022-03-19 PROBLEM — M25.552 HIP PAIN, ACUTE, LEFT: Status: ACTIVE | Noted: 2017-10-19

## 2022-03-20 PROBLEM — M94.9 DISORDER OF BONE AND CARTILAGE: Status: ACTIVE | Noted: 2017-10-19

## 2022-03-20 PROBLEM — E78.00 PURE HYPERCHOLESTEROLEMIA: Status: ACTIVE | Noted: 2017-11-29

## 2022-03-20 PROBLEM — M89.9 DISORDER OF BONE AND CARTILAGE: Status: ACTIVE | Noted: 2017-10-19

## 2022-03-20 PROBLEM — K58.1 IRRITABLE BOWEL SYNDROME WITH CONSTIPATION: Status: ACTIVE | Noted: 2017-10-19

## 2022-07-27 ENCOUNTER — OFFICE VISIT (OUTPATIENT)
Dept: INTERNAL MEDICINE CLINIC | Age: 77
End: 2022-07-27
Payer: MEDICARE

## 2022-07-27 VITALS
RESPIRATION RATE: 18 BRPM | SYSTOLIC BLOOD PRESSURE: 120 MMHG | OXYGEN SATURATION: 95 % | WEIGHT: 160.4 LBS | TEMPERATURE: 97.5 F | HEART RATE: 85 BPM | DIASTOLIC BLOOD PRESSURE: 74 MMHG | HEIGHT: 62 IN | BODY MASS INDEX: 29.52 KG/M2

## 2022-07-27 DIAGNOSIS — F41.9 ANXIETY: Primary | ICD-10-CM

## 2022-07-27 PROCEDURE — 1090F PRES/ABSN URINE INCON ASSESS: CPT | Performed by: INTERNAL MEDICINE

## 2022-07-27 PROCEDURE — G8427 DOCREV CUR MEDS BY ELIG CLIN: HCPCS | Performed by: INTERNAL MEDICINE

## 2022-07-27 PROCEDURE — G8417 CALC BMI ABV UP PARAM F/U: HCPCS | Performed by: INTERNAL MEDICINE

## 2022-07-27 PROCEDURE — G8399 PT W/DXA RESULTS DOCUMENT: HCPCS | Performed by: INTERNAL MEDICINE

## 2022-07-27 PROCEDURE — G8536 NO DOC ELDER MAL SCRN: HCPCS | Performed by: INTERNAL MEDICINE

## 2022-07-27 PROCEDURE — 1101F PT FALLS ASSESS-DOCD LE1/YR: CPT | Performed by: INTERNAL MEDICINE

## 2022-07-27 PROCEDURE — G8432 DEP SCR NOT DOC, RNG: HCPCS | Performed by: INTERNAL MEDICINE

## 2022-07-27 PROCEDURE — 99213 OFFICE O/P EST LOW 20 MIN: CPT | Performed by: INTERNAL MEDICINE

## 2022-07-27 PROCEDURE — 1123F ACP DISCUSS/DSCN MKR DOCD: CPT | Performed by: INTERNAL MEDICINE

## 2022-07-27 RX ORDER — ESCITALOPRAM OXALATE 5 MG/1
5 TABLET ORAL DAILY
Qty: 30 TABLET | Refills: 1 | Status: SHIPPED | OUTPATIENT
Start: 2022-07-27 | End: 2022-08-19 | Stop reason: SDUPTHER

## 2022-07-27 NOTE — PROGRESS NOTES
Sheeba Callejas is a 68 y.o. female presenting for Anxiety  . 1. Have you been to the ER, urgent care clinic since your last visit? Hospitalized since your last visit? No    2. Have you seen or consulted any other health care providers outside of the [address] [place] [address] since your last visit? Include any pap smears or colon screening. No    Fall Risk Assessment, last 12 mths 2/14/2022   Able to walk? Yes   Fall in past 12 months? 0   Do you feel unsteady? 0   Are you worried about falling 0   Number of falls in past 12 months -   Fall with injury? -         Abuse Screening Questionnaire 2/14/2022   Do you ever feel afraid of your partner? N   Are you in a relationship with someone who physically or mentally threatens you? N   Is it safe for you to go home? Y       3 most recent PHQ Screens 2/14/2022   Little interest or pleasure in doing things Not at all   Feeling down, depressed, irritable, or hopeless Not at all   Total Score PHQ 2 0       There are no discontinued medications.

## 2022-07-27 NOTE — PROGRESS NOTES
Genevieve Nolan is a 68 y.o. female and presents with Anxiety  . Subjective:  Mrs. Tatiana Bloom presents today with complaint of anxiety. She states over the past several weeks or so she has had some anxiety. There are no inciting or triggering events. Her children are doing well. She remains active overall. She has had no problems sleeping noting that she is taking melatonin. She is also on amitriptyline for migraine prophylaxis. She does not have any hand anhedonia or agoraphobia. She will sometimes feel anxious under circumstances of driving.     Past Medical History:   Diagnosis Date    Adult onset hypothyroidism 10/19/2017    Anxiety 7/27/2022    Arthritis     feet and hands    Back pain with radiation 10/19/2017    Breast cyst 6 months ago     right breast     Disorder of bone and cartilage 10/19/2017    GERD (gastroesophageal reflux disease)     High risk medication use 10/19/2017    Hip pain, acute, left 10/19/2017    hypercholesteremia     elevatd cholesterol    Hyperlipidemia 10/19/2017    Hypothyroid     hypothyroid    Hypothyroid 10/19/2017    Irritable bowel syndrome with constipation 10/19/2017    Knee pain 10/19/2017    Menopausal disorder 10/19/2017    Menopause     Migraine 10/19/2017    Migraines     Neurological disorder     migraines    On statin therapy 6/18/2019    Urinary frequency 10/19/2017     Past Surgical History:   Procedure Laterality Date    COLONOSCOPY Left 6/1/2018    COLONOSCOPY performed by Asia Zamora MD at [address] Left 11 years ago     negative    HX CATARACT REMOVAL Left 07/2020    HX GYN      hysterectomy    HX HYSTERECTOMY      HX OOPHORECTOMY      HX ORTHOPAEDIC  11/9/11    SPINE LUMBAR LAMINECTOMY - L4-5 LAMINECTOMY FOR REMOVAL OF EXTRA DURAL LESION ON THE LEFT - benign    HX OTHER SURGICAL      ganglion cyst removed finger    HX TONSILLECTOMY      [place] ABDOMEN SURGERY PROC UNLISTED  2011    removed \"sludge\" from duct in gallbladder Allergies   Allergen Reactions    Latex Rash     Bandages causes a rash if left on for more than a couple of days. She buys latex free band-aids. Codeine Nausea and Vomiting     Current Outpatient Medications   Medication Sig Dispense Refill    melatonin (MELATIN PO) Take  by mouth nightly as needed. escitalopram oxalate (LEXAPRO) 5 mg tablet Take 1 Tablet by mouth in the morning. 30 Tablet 1    levothyroxine (SYNTHROID) 125 mcg tablet TAKE 1 TABLET EVERY DAY FOR THYROID 90 Tablet 3    amitriptyline (ELAVIL) 25 mg tablet TAKE 2 TABLETS BY MOUTH IN THE EVENING 180 Tablet 3    simvastatin (ZOCOR) 20 mg tablet TAKE 1 TABLET BY MOUTH EVERY DAY 90 Tablet 3    omeprazole (PRILOSEC OTC) 20 mg tablet Take 20 mg by mouth daily. Social History     Socioeconomic History    Marital status:    Tobacco Use    Smoking status: Never    Smokeless tobacco: Never   Substance and Sexual Activity    Alcohol use: Yes     Comment: rarely    Drug use: No    Sexual activity: Yes     Partners: Male     Birth control/protection: Surgical     Family History   Problem Relation Age of Onset    Heart Disease Mother         \"shrinking heart valve\"       Review of Systems  Constitutional:  negative for fevers, chills, anorexia and weight loss  Eyes:    negative for visual disturbance and irritation  ENT:    negative for tinnitus,sore throat,nasal congestion,ear pains. hoarseness  Respiratory:     negative for cough, hemoptysis, dyspnea,wheezing  CV:    negative for chest pain, palpitations, lower extremity edema  GI:    negative for nausea, vomiting, diarrhea, abdominal pain,melena  Endo:               negative for polyuria,polydipsia,polyphagia,heat intolerance  Genitourinary : negative for frequency, dysuria and hematuria  Integumentary: negative for rash and pruritus  Hematologic:   negative for easy bruising and gum/nose bleeding  Musculoskel:  negative for myalgias, arthralgias, back pain, muscle weakness, joint pain  Neurological:   negative for headaches, dizziness, vertigo, memory problems and gait   Behavl/Psych: Positive for feelings of anxiety, depression, mood changes  ROS otherwise negative      Objective:  Visit Vitals  /74 (BP 1 Location: Left upper arm, BP Patient Position: Sitting, BP Cuff Size: Adult)   Pulse 85   Temp 97.5 °F (36.4 °C) (Oral)   Resp 18   Ht 5' 2\" (1.575 m)   Wt 160 lb 6.4 oz (72.8 kg)   LMP  (LMP Unknown)   SpO2 95%   BMI 29.34 kg/m²     Physical Exam:   General appearance - alert, well appearing, and in no distress  Mental status - alert, oriented to person, place, and time  EYE-NIEVES, EOMI, fundi normal, corneas normal, no foreign bodies  ENT-ENT exam normal, no neck nodes or sinus tenderness  Nose - normal and patent, no erythema, discharge or polyps  Mouth - mucous membranes moist, pharynx normal without lesions  Neck - supple, no significant adenopathy   Chest - clear to auscultation, no wheezes, rales or rhonchi, symmetric air entry   Heart - normal rate, regular rhythm, normal S1, S2, no murmurs, rubs, clicks or gallops   Abdomen - soft, nontender, nondistended, no masses or organomegaly  Lymph- no adenopathy palpable  Ext-peripheral pulses normal, no pedal edema, no clubbing or cyanosis  Skin-Warm and dry. no hyperpigmentation, vitiligo, or suspicious lesions  Neuro -alert, oriented, normal speech, no focal findings or movement disorder noted      Assessment/Plan:  Diagnoses and all orders for this visit:    1. Anxiety    Other orders  -     escitalopram oxalate (LEXAPRO) 5 mg tablet; Take 1 Tablet by mouth in the morning. ICD-10-CM ICD-9-CM    1. Anxiety  F41.9 300.00           Plan:    Start escitalopram 5 mg daily. She will call if she develops any side effects or does not feel this is effective for her. It may take 2 to 4 weeks to see a response. She will follow-up otherwise as scheduled.       I have reviewed with the patient details of the assessment and plan and all questions were answered. Relevent patient education was performed. Verbal and/or written instructions (see AVS) provided. The most recent lab findings were reviewed with the patient. Plan was discussed with patient who verbally expressed understanding. An After Visit Summary was printed and given to the patient.     Yg Gilbert MD

## 2022-07-29 ENCOUNTER — TRANSCRIBE ORDER (OUTPATIENT)
Dept: SCHEDULING | Age: 77
End: 2022-07-29

## 2022-07-29 DIAGNOSIS — Z12.31 VISIT FOR SCREENING MAMMOGRAM: Primary | ICD-10-CM

## 2022-08-16 ENCOUNTER — OFFICE VISIT (OUTPATIENT)
Dept: INTERNAL MEDICINE CLINIC | Age: 77
End: 2022-08-16
Payer: MEDICARE

## 2022-08-16 VITALS
TEMPERATURE: 98 F | HEART RATE: 90 BPM | BODY MASS INDEX: 29.11 KG/M2 | SYSTOLIC BLOOD PRESSURE: 130 MMHG | HEIGHT: 62 IN | WEIGHT: 158.2 LBS | RESPIRATION RATE: 18 BRPM | DIASTOLIC BLOOD PRESSURE: 74 MMHG | OXYGEN SATURATION: 98 %

## 2022-08-16 DIAGNOSIS — E55.9 VITAMIN D DEFICIENCY: ICD-10-CM

## 2022-08-16 DIAGNOSIS — F41.9 ANXIETY: ICD-10-CM

## 2022-08-16 DIAGNOSIS — E03.9 ACQUIRED HYPOTHYROIDISM: Primary | ICD-10-CM

## 2022-08-16 DIAGNOSIS — R53.83 FATIGUE, UNSPECIFIED TYPE: ICD-10-CM

## 2022-08-16 DIAGNOSIS — R35.0 URINARY FREQUENCY: ICD-10-CM

## 2022-08-16 DIAGNOSIS — Z79.899 ON STATIN THERAPY: ICD-10-CM

## 2022-08-16 DIAGNOSIS — E78.00 PURE HYPERCHOLESTEROLEMIA: ICD-10-CM

## 2022-08-16 LAB
25(OH)D3 SERPL-MCNC: 17.6 NG/ML (ref 30–100)
ALBUMIN SERPL-MCNC: 3.7 G/DL (ref 3.5–5)
ALBUMIN/GLOB SERPL: 1.2 {RATIO} (ref 1.1–2.2)
ALP SERPL-CCNC: 71 U/L (ref 45–117)
ALT SERPL-CCNC: 23 U/L (ref 12–78)
ANION GAP SERPL CALC-SCNC: 6 MMOL/L (ref 5–15)
APPEARANCE UR: CLEAR
AST SERPL-CCNC: 21 U/L (ref 15–37)
BACTERIA URNS QL MICRO: NEGATIVE /HPF
BASOPHILS # BLD: 0.1 K/UL (ref 0–0.1)
BASOPHILS NFR BLD: 1 % (ref 0–1)
BILIRUB SERPL-MCNC: 0.5 MG/DL (ref 0.2–1)
BILIRUB UR QL: NEGATIVE
BUN SERPL-MCNC: 12 MG/DL (ref 6–20)
BUN/CREAT SERPL: 15 (ref 12–20)
CALCIUM SERPL-MCNC: 9.7 MG/DL (ref 8.5–10.1)
CHLORIDE SERPL-SCNC: 102 MMOL/L (ref 97–108)
CHOLEST SERPL-MCNC: 179 MG/DL
CK SERPL-CCNC: 94 U/L (ref 26–192)
CO2 SERPL-SCNC: 30 MMOL/L (ref 21–32)
COLOR UR: ABNORMAL
CREAT SERPL-MCNC: 0.78 MG/DL (ref 0.55–1.02)
DIFFERENTIAL METHOD BLD: NORMAL
EOSINOPHIL # BLD: 0 K/UL (ref 0–0.4)
EOSINOPHIL NFR BLD: 1 % (ref 0–7)
EPITH CASTS URNS QL MICRO: ABNORMAL /LPF
ERYTHROCYTE [DISTWIDTH] IN BLOOD BY AUTOMATED COUNT: 12.6 % (ref 11.5–14.5)
GLOBULIN SER CALC-MCNC: 3.1 G/DL (ref 2–4)
GLUCOSE SERPL-MCNC: 107 MG/DL (ref 65–100)
GLUCOSE UR STRIP.AUTO-MCNC: NEGATIVE MG/DL
HCT VFR BLD AUTO: 42 % (ref 35–47)
HDLC SERPL-MCNC: 81 MG/DL
HDLC SERPL: 2.2 {RATIO} (ref 0–5)
HGB BLD-MCNC: 14 G/DL (ref 11.5–16)
HGB UR QL STRIP: ABNORMAL
HYALINE CASTS URNS QL MICRO: ABNORMAL /LPF (ref 0–5)
IMM GRANULOCYTES # BLD AUTO: 0 K/UL (ref 0–0.04)
IMM GRANULOCYTES NFR BLD AUTO: 0 % (ref 0–0.5)
KETONES UR QL STRIP.AUTO: NEGATIVE MG/DL
LDLC SERPL CALC-MCNC: 77.8 MG/DL (ref 0–100)
LEUKOCYTE ESTERASE UR QL STRIP.AUTO: NEGATIVE
LYMPHOCYTES # BLD: 1.9 K/UL (ref 0.8–3.5)
LYMPHOCYTES NFR BLD: 23 % (ref 12–49)
MCH RBC QN AUTO: 30.2 PG (ref 26–34)
MCHC RBC AUTO-ENTMCNC: 33.3 G/DL (ref 30–36.5)
MCV RBC AUTO: 90.7 FL (ref 80–99)
MONOCYTES # BLD: 0.6 K/UL (ref 0–1)
MONOCYTES NFR BLD: 7 % (ref 5–13)
NEUTS SEG # BLD: 5.8 K/UL (ref 1.8–8)
NEUTS SEG NFR BLD: 68 % (ref 32–75)
NITRITE UR QL STRIP.AUTO: NEGATIVE
NRBC # BLD: 0 K/UL (ref 0–0.01)
NRBC BLD-RTO: 0 PER 100 WBC
PH UR STRIP: 6.5 [PH] (ref 5–8)
PLATELET # BLD AUTO: 257 K/UL (ref 150–400)
PMV BLD AUTO: 10.3 FL (ref 8.9–12.9)
POTASSIUM SERPL-SCNC: 3.8 MMOL/L (ref 3.5–5.1)
PROT SERPL-MCNC: 6.8 G/DL (ref 6.4–8.2)
PROT UR STRIP-MCNC: NEGATIVE MG/DL
RBC # BLD AUTO: 4.63 M/UL (ref 3.8–5.2)
RBC #/AREA URNS HPF: ABNORMAL /HPF (ref 0–5)
SODIUM SERPL-SCNC: 138 MMOL/L (ref 136–145)
SP GR UR REFRACTOMETRY: <1.005 (ref 1–1.03)
T4 FREE SERPL-MCNC: 1.7 NG/DL (ref 0.8–1.5)
TRIGL SERPL-MCNC: 101 MG/DL (ref ?–150)
TSH SERPL DL<=0.05 MIU/L-ACNC: 0.22 UIU/ML (ref 0.36–3.74)
UROBILINOGEN UR QL STRIP.AUTO: 0.2 EU/DL (ref 0.2–1)
VLDLC SERPL CALC-MCNC: 20.2 MG/DL
WBC # BLD AUTO: 8.4 K/UL (ref 3.6–11)
WBC URNS QL MICRO: ABNORMAL /HPF (ref 0–4)

## 2022-08-16 PROCEDURE — 1090F PRES/ABSN URINE INCON ASSESS: CPT | Performed by: INTERNAL MEDICINE

## 2022-08-16 PROCEDURE — 1123F ACP DISCUSS/DSCN MKR DOCD: CPT | Performed by: INTERNAL MEDICINE

## 2022-08-16 PROCEDURE — G8432 DEP SCR NOT DOC, RNG: HCPCS | Performed by: INTERNAL MEDICINE

## 2022-08-16 PROCEDURE — G8427 DOCREV CUR MEDS BY ELIG CLIN: HCPCS | Performed by: INTERNAL MEDICINE

## 2022-08-16 PROCEDURE — 99214 OFFICE O/P EST MOD 30 MIN: CPT | Performed by: INTERNAL MEDICINE

## 2022-08-16 PROCEDURE — G8417 CALC BMI ABV UP PARAM F/U: HCPCS | Performed by: INTERNAL MEDICINE

## 2022-08-16 PROCEDURE — 1101F PT FALLS ASSESS-DOCD LE1/YR: CPT | Performed by: INTERNAL MEDICINE

## 2022-08-16 PROCEDURE — G8399 PT W/DXA RESULTS DOCUMENT: HCPCS | Performed by: INTERNAL MEDICINE

## 2022-08-16 PROCEDURE — G8536 NO DOC ELDER MAL SCRN: HCPCS | Performed by: INTERNAL MEDICINE

## 2022-08-16 RX ORDER — MIRTAZAPINE 15 MG/1
15 TABLET, FILM COATED ORAL
Qty: 30 TABLET | Refills: 0 | Status: SHIPPED | OUTPATIENT
Start: 2022-08-16 | End: 2022-08-22

## 2022-08-16 NOTE — PROGRESS NOTES
Paulie Son is a 68 y.o. female and presents with Follow Up Chronic Condition (6 mo fu)  . Subjective:  Mrs. Anabel Coffman presents today for follow-up of hypothyroidism, irritable bowel, urinary frequency, insomnia, and anxiety. She was recently started on escitalopram 5 mg daily. It has only been about 2 weeks since she started this medication so she has not really noticed much of a difference at this point time. She is still having difficulty staying asleep and wakes up early in the mornings. She takes melatonin at night and goes to bed around 11 PM.  She gets to sleep okay but will awaken early and is unable to get back to sleep. She is up frequently to go to the bathroom and has urinary frequency during the course of the day as well. She does not have increased thirst but she is trying to stay hydrated. She remains on simvastatin for dyslipidemia. She remains on levothyroxine 125 mcg daily for hypothyroidism. She has no shortness of breath, chest pain, palpitations, PND, orthopnea, or pedal edema.     Past Medical History:   Diagnosis Date    Adult onset hypothyroidism 10/19/2017    Anxiety 7/27/2022    Arthritis     feet and hands    Back pain with radiation 10/19/2017    Breast cyst 6 months ago     right breast     Disorder of bone and cartilage 10/19/2017    GERD (gastroesophageal reflux disease)     High risk medication use 10/19/2017    Hip pain, acute, left 10/19/2017    hypercholesteremia     elevatd cholesterol    Hyperlipidemia 10/19/2017    Hypothyroid     hypothyroid    Hypothyroid 10/19/2017    Irritable bowel syndrome with constipation 10/19/2017    Knee pain 10/19/2017    Menopausal disorder 10/19/2017    Menopause     Migraine 10/19/2017    Migraines     Neurological disorder     migraines    On statin therapy 6/18/2019    Urinary frequency 10/19/2017     Past Surgical History:   Procedure Laterality Date    COLONOSCOPY Left 6/1/2018    COLONOSCOPY performed by Dorian Nicole, MD at [facility] ENDOSCOPY    HX BREAST BIOPSY Left 11 years ago     negative    HX CATARACT REMOVAL Left 07/2020    HX GYN      hysterectomy    HX HYSTERECTOMY      HX OOPHORECTOMY      HX ORTHOPAEDIC  11/9/11    SPINE LUMBAR LAMINECTOMY - L4-5 LAMINECTOMY FOR REMOVAL OF EXTRA DURAL LESION ON THE LEFT - benign    HX OTHER SURGICAL      ganglion cyst removed finger    HX TONSILLECTOMY      [place] ABDOMEN SURGERY PROC UNLISTED  2011    removed \"sludge\" from duct in gallbladder     Allergies   Allergen Reactions    Latex Rash     Bandages causes a rash if left on for more than a couple of days. She buys latex free band-aids. Codeine Nausea and Vomiting     Current Outpatient Medications   Medication Sig Dispense Refill    mirtazapine (REMERON) 15 mg tablet Take 1 Tablet by mouth nightly. 30 Tablet 0    melatonin (MELATIN PO) Take  by mouth nightly as needed. escitalopram oxalate (LEXAPRO) 5 mg tablet Take 1 Tablet by mouth in the morning. 30 Tablet 1    levothyroxine (SYNTHROID) 125 mcg tablet TAKE 1 TABLET EVERY DAY FOR THYROID 90 Tablet 3    simvastatin (ZOCOR) 20 mg tablet TAKE 1 TABLET BY MOUTH EVERY DAY 90 Tablet 3    omeprazole (PRILOSEC OTC) 20 mg tablet Take 20 mg by mouth daily. Social History     Socioeconomic History    Marital status:    Tobacco Use    Smoking status: Never    Smokeless tobacco: Never   Vaping Use    Vaping Use: Never used   Substance and Sexual Activity    Alcohol use: Yes     Comment: rarely    Drug use: No    Sexual activity: Yes     Partners: Male     Birth control/protection: Surgical     Family History   Problem Relation Age of Onset    Heart Disease Mother         \"shrinking heart valve\"       Review of Systems  Constitutional:  negative for fevers, chills, anorexia and weight loss  Eyes:    negative for visual disturbance and irritation  ENT:    negative for tinnitus,sore throat,nasal congestion,ear pains. hoarseness  Respiratory:     negative for cough, hemoptysis, dyspnea,wheezing  CV:    negative for chest pain, palpitations, lower extremity edema  GI:    negative for nausea, vomiting, diarrhea, abdominal pain,melena  Endo:               negative for polyuria,polydipsia,polyphagia,heat intolerance  Genitourinary : negative for frequency, dysuria and hematuria  Integumentary: negative for rash and pruritus  Hematologic:   negative for easy bruising and gum/nose bleeding  Musculoskel:  negative for myalgias, arthralgias, back pain, muscle weakness, joint pain  Neurological:   negative for headaches, dizziness, vertigo, memory problems and gait   Behavl/Psych:  negative for feelings of anxiety, depression, mood changes  ROS otherwise negative      Objective:  Visit Vitals  /74 (BP 1 Location: Left upper arm, BP Patient Position: Sitting, BP Cuff Size: Adult)   Pulse 90   Temp 98 °F (36.7 °C) (Oral)   Resp 18   Ht 5' 2\" (1.575 m)   Wt 158 lb 3.2 oz (71.8 kg)   LMP  (LMP Unknown)   SpO2 98%   BMI 28.94 kg/m²     Physical Exam:   General appearance - alert, well appearing, and in no distress  Mental status - alert, oriented to person, place, and time  EYE-NIEVES, EOMI, fundi normal, corneas normal, no foreign bodies  ENT-ENT exam normal, no neck nodes or sinus tenderness  Nose - normal and patent, no erythema, discharge or polyps  Mouth - mucous membranes moist, pharynx normal without lesions  Neck - supple, no significant adenopathy   Chest - clear to auscultation, no wheezes, rales or rhonchi, symmetric air entry   Heart - normal rate, regular rhythm, normal S1, S2, no murmurs, rubs, clicks or gallops   Abdomen - soft, nontender, nondistended, no masses or organomegaly  Lymph- no adenopathy palpable  Ext-peripheral pulses normal, no pedal edema, no clubbing or cyanosis  Skin-Warm and dry.  no hyperpigmentation, vitiligo, or suspicious lesions  Neuro -alert, oriented, normal speech, no focal findings or movement disorder noted      Assessment/Plan:  Diagnoses and all orders for this visit:    1. Acquired hypothyroidism  -     TSH 3RD GENERATION; Future  -     T4, FREE; Future    2. Anxiety    3. On statin therapy  -     LIPID PANEL; Future  -     METABOLIC PANEL, COMPREHENSIVE; Future  -     CK; Future    4. Pure hypercholesterolemia  -     LIPID PANEL; Future  -     METABOLIC PANEL, COMPREHENSIVE; Future  -     CK; Future    5. Urinary frequency  -     URINALYSIS W/ RFLX MICROSCOPIC; Future    6. Vitamin D deficiency  -     VITAMIN D, 25 HYDROXY; Future    7. Fatigue, unspecified type  -     CBC WITH AUTOMATED DIFF; Future    Other orders  -     mirtazapine (REMERON) 15 mg tablet; Take 1 Tablet by mouth nightly. ICD-10-CM ICD-9-CM    1. Acquired hypothyroidism  E03.9 244.9 TSH 3RD GENERATION      T4, FREE      2. Anxiety  F41.9 300.00       3. On statin therapy  Z79.899 V58.69 LIPID PANEL      METABOLIC PANEL, COMPREHENSIVE      CK      4. Pure hypercholesterolemia  E78.00 272.0 LIPID PANEL      METABOLIC PANEL, COMPREHENSIVE      CK      5. Urinary frequency  R35.0 788.41 URINALYSIS W/ RFLX MICROSCOPIC      6. Vitamin D deficiency  E55.9 268.9 VITAMIN D, 25 HYDROXY      7. Fatigue, unspecified type  R53.83 780.79 CBC WITH AUTOMATED DIFF        Plan:    Continue current medical regimen. Start mirtazapine 15 mg nightly. Continue escitalopram 5 mg daily. Rule out metabolic changes with labs. Plan follow-up in 3 weeks for reevaluation. Follow-up and Dispositions    Return in about 3 weeks (around 9/6/2022) for follow up. I have reviewed with the patient details of the assessment and plan and all questions were answered. Relevent patient education was performed. Verbal and/or written instructions (see AVS) provided. The most recent lab findings were reviewed with the patient. Plan was discussed with patient who verbally expressed understanding. An After Visit Summary was printed and given to the patient.     Chaitanya Pedro MD

## 2022-08-16 NOTE — PROGRESS NOTES
Mukund Cueva is a 68 y.o. female presenting for Follow Up Chronic Condition (6 mo fu)  . 1. Have you been to the ER, urgent care clinic since your last visit? Hospitalized since your last visit? No    2. Have you seen or consulted any other health care providers outside of the [address] [place] [address] since your last visit? Include any pap smears or colon screening. No    Fall Risk Assessment, last 12 mths 2/14/2022   Able to walk? Yes   Fall in past 12 months? 0   Do you feel unsteady? 0   Are you worried about falling 0   Number of falls in past 12 months -   Fall with injury? -         Abuse Screening Questionnaire 2/14/2022   Do you ever feel afraid of your partner? N   Are you in a relationship with someone who physically or mentally threatens you? N   Is it safe for you to go home? Y       3 most recent PHQ Screens 2/14/2022   Little interest or pleasure in doing things Not at all   Feeling down, depressed, irritable, or hopeless Not at all   Total Score PHQ 2 0       There are no discontinued medications.

## 2022-08-17 RX ORDER — ERGOCALCIFEROL 1.25 MG/1
50000 CAPSULE ORAL
Qty: 12 CAPSULE | Refills: 0 | Status: SHIPPED | OUTPATIENT
Start: 2022-08-17 | End: 2022-11-02

## 2022-08-17 RX ORDER — LEVOTHYROXINE SODIUM 100 UG/1
100 TABLET ORAL
Qty: 90 TABLET | Refills: 0 | Status: SHIPPED | OUTPATIENT
Start: 2022-08-17

## 2022-08-17 NOTE — PROGRESS NOTES
Your thyroid function test shows that you are on too high of a dose of thyroid medication. This can contribute to mood changes and anxiety. Please decrease your levothyroxine to 100 mcg daily. A prescription will be sent to your pharmacy. Please schedule a follow-up lab test in 6 weeks for reevaluation. In addition your vitamin D level is very low. Please start 50,000 units of vitamin D 2 weekly for 12 weeks.

## 2022-08-19 RX ORDER — ESCITALOPRAM OXALATE 5 MG/1
5 TABLET ORAL DAILY
Qty: 30 TABLET | Refills: 1 | Status: SHIPPED | OUTPATIENT
Start: 2022-08-19 | End: 2022-08-22

## 2022-08-19 NOTE — TELEPHONE ENCOUNTER
PCP: Whitney Gonzalez MD    Last appt: 8/16/2022  Future Appointments   Date Time Provider Henok Schrader   8/23/2022  2:00 PM ED [facility] 3 [facility]   9/7/2022  2:45 PM Whitney Gonzalez MD [facility]   9/28/2022 10:40 AM LAB ONLY [facility]       Requested Prescriptions     Pending Prescriptions Disp Refills    escitalopram oxalate (LEXAPRO) 5 mg tablet 30 Tablet 1     Sig: Take 1 Tablet by mouth daily.        Prior labs and Blood pressures:  BP Readings from Last 3 Encounters:   08/16/22 130/74   07/27/22 120/74   02/14/22 (!) 142/98     Lab Results   Component Value Date/Time    Sodium 138 08/16/2022 10:34 AM    Potassium 3.8 08/16/2022 10:34 AM    Chloride 102 08/16/2022 10:34 AM    CO2 30 08/16/2022 10:34 AM    Anion gap 6 08/16/2022 10:34 AM    Glucose 107 (H) 08/16/2022 10:34 AM    BUN 12 08/16/2022 10:34 AM    Creatinine 0.78 08/16/2022 10:34 AM    BUN/Creatinine ratio 15 08/16/2022 10:34 AM    GFR est AA >60 08/16/2022 10:34 AM    GFR est non-AA >60 08/16/2022 10:34 AM    Calcium 9.7 08/16/2022 10:34 AM

## 2022-08-22 ENCOUNTER — OFFICE VISIT (OUTPATIENT)
Dept: INTERNAL MEDICINE CLINIC | Age: 77
End: 2022-08-22
Payer: MEDICARE

## 2022-08-22 ENCOUNTER — HOSPITAL ENCOUNTER (EMERGENCY)
Age: 77
Discharge: HOME | End: 2022-08-22
Attending: EMERGENCY MEDICINE
Payer: MEDICARE

## 2022-08-22 VITALS
DIASTOLIC BLOOD PRESSURE: 99 MMHG | WEIGHT: 158.07 LBS | HEIGHT: 62 IN | OXYGEN SATURATION: 96 % | SYSTOLIC BLOOD PRESSURE: 196 MMHG | TEMPERATURE: 97.5 F | RESPIRATION RATE: 18 BRPM | HEART RATE: 86 BPM | BODY MASS INDEX: 29.09 KG/M2

## 2022-08-22 VITALS
OXYGEN SATURATION: 97 % | WEIGHT: 159.8 LBS | DIASTOLIC BLOOD PRESSURE: 88 MMHG | TEMPERATURE: 98.1 F | HEART RATE: 82 BPM | SYSTOLIC BLOOD PRESSURE: 132 MMHG | RESPIRATION RATE: 17 BRPM | BODY MASS INDEX: 29.4 KG/M2 | HEIGHT: 62 IN

## 2022-08-22 DIAGNOSIS — R20.8 BURNING SENSATION: Primary | ICD-10-CM

## 2022-08-22 DIAGNOSIS — F41.9 ANXIETY: Primary | ICD-10-CM

## 2022-08-22 LAB
ALBUMIN SERPL-MCNC: 3.4 G/DL (ref 3.5–5)
ALBUMIN/GLOB SERPL: 0.9 {RATIO} (ref 1.1–2.2)
ALP SERPL-CCNC: 78 U/L (ref 45–117)
ALT SERPL-CCNC: 20 U/L (ref 12–78)
ANION GAP SERPL CALC-SCNC: 4 MMOL/L (ref 5–15)
AST SERPL-CCNC: 21 U/L (ref 15–37)
BASOPHILS # BLD: 0.1 K/UL (ref 0–0.1)
BASOPHILS NFR BLD: 1 % (ref 0–1)
BILIRUB SERPL-MCNC: 0.4 MG/DL (ref 0.2–1)
BUN SERPL-MCNC: 16 MG/DL (ref 6–20)
BUN/CREAT SERPL: 17 (ref 12–20)
CALCIUM SERPL-MCNC: 9.2 MG/DL (ref 8.5–10.1)
CHLORIDE SERPL-SCNC: 107 MMOL/L (ref 97–108)
CO2 SERPL-SCNC: 29 MMOL/L (ref 21–32)
CREAT SERPL-MCNC: 0.93 MG/DL (ref 0.55–1.02)
DIFFERENTIAL METHOD BLD: NORMAL
EOSINOPHIL # BLD: 0.3 K/UL (ref 0–0.4)
EOSINOPHIL NFR BLD: 4 % (ref 0–7)
ERYTHROCYTE [DISTWIDTH] IN BLOOD BY AUTOMATED COUNT: 12.6 % (ref 11.5–14.5)
GLOBULIN SER CALC-MCNC: 3.6 G/DL (ref 2–4)
GLUCOSE SERPL-MCNC: 105 MG/DL (ref 65–100)
HCT VFR BLD AUTO: 41.3 % (ref 35–47)
HGB BLD-MCNC: 13.8 G/DL (ref 11.5–16)
IMM GRANULOCYTES # BLD AUTO: 0 K/UL (ref 0–0.04)
IMM GRANULOCYTES NFR BLD AUTO: 0 % (ref 0–0.5)
LYMPHOCYTES # BLD: 1.7 K/UL (ref 0.8–3.5)
LYMPHOCYTES NFR BLD: 27 % (ref 12–49)
MCH RBC QN AUTO: 30.6 PG (ref 26–34)
MCHC RBC AUTO-ENTMCNC: 33.4 G/DL (ref 30–36.5)
MCV RBC AUTO: 91.6 FL (ref 80–99)
MONOCYTES # BLD: 0.5 K/UL (ref 0–1)
MONOCYTES NFR BLD: 8 % (ref 5–13)
NEUTS SEG # BLD: 3.8 K/UL (ref 1.8–8)
NEUTS SEG NFR BLD: 60 % (ref 32–75)
NRBC # BLD: 0 K/UL (ref 0–0.01)
NRBC BLD-RTO: 0 PER 100 WBC
PLATELET # BLD AUTO: 246 K/UL (ref 150–400)
PMV BLD AUTO: 10 FL (ref 8.9–12.9)
POTASSIUM SERPL-SCNC: 4.4 MMOL/L (ref 3.5–5.1)
PROT SERPL-MCNC: 7 G/DL (ref 6.4–8.2)
RBC # BLD AUTO: 4.51 M/UL (ref 3.8–5.2)
SODIUM SERPL-SCNC: 140 MMOL/L (ref 136–145)
WBC # BLD AUTO: 6.4 K/UL (ref 3.6–11)

## 2022-08-22 PROCEDURE — G8536 NO DOC ELDER MAL SCRN: HCPCS | Performed by: INTERNAL MEDICINE

## 2022-08-22 PROCEDURE — 1123F ACP DISCUSS/DSCN MKR DOCD: CPT | Performed by: INTERNAL MEDICINE

## 2022-08-22 PROCEDURE — G8427 DOCREV CUR MEDS BY ELIG CLIN: HCPCS | Performed by: INTERNAL MEDICINE

## 2022-08-22 PROCEDURE — 1101F PT FALLS ASSESS-DOCD LE1/YR: CPT | Performed by: INTERNAL MEDICINE

## 2022-08-22 PROCEDURE — 99283 EMERGENCY DEPT VISIT LOW MDM: CPT

## 2022-08-22 PROCEDURE — 99213 OFFICE O/P EST LOW 20 MIN: CPT | Performed by: INTERNAL MEDICINE

## 2022-08-22 PROCEDURE — G8417 CALC BMI ABV UP PARAM F/U: HCPCS | Performed by: INTERNAL MEDICINE

## 2022-08-22 PROCEDURE — 36415 COLL VENOUS BLD VENIPUNCTURE: CPT

## 2022-08-22 PROCEDURE — 1090F PRES/ABSN URINE INCON ASSESS: CPT | Performed by: INTERNAL MEDICINE

## 2022-08-22 PROCEDURE — G8399 PT W/DXA RESULTS DOCUMENT: HCPCS | Performed by: INTERNAL MEDICINE

## 2022-08-22 PROCEDURE — G8432 DEP SCR NOT DOC, RNG: HCPCS | Performed by: INTERNAL MEDICINE

## 2022-08-22 PROCEDURE — 85025 COMPLETE CBC W/AUTO DIFF WBC: CPT

## 2022-08-22 PROCEDURE — 80053 COMPREHEN METABOLIC PANEL: CPT

## 2022-08-22 RX ORDER — ALPRAZOLAM 0.25 MG/1
0.25 TABLET ORAL
Qty: 30 TABLET | Refills: 0 | Status: SHIPPED | OUTPATIENT
Start: 2022-08-22

## 2022-08-22 RX ORDER — ESCITALOPRAM OXALATE 10 MG/1
5 TABLET ORAL DAILY
Qty: 90 TABLET | Refills: 1 | Status: SHIPPED | OUTPATIENT
Start: 2022-08-22 | End: 2022-08-22 | Stop reason: SDUPTHER

## 2022-08-22 RX ORDER — ESCITALOPRAM OXALATE 10 MG/1
10 TABLET ORAL DAILY
Qty: 90 TABLET | Refills: 1 | Status: SHIPPED | OUTPATIENT
Start: 2022-08-22 | End: 2022-09-29 | Stop reason: SDUPTHER

## 2022-08-22 NOTE — PROGRESS NOTES
Man Hernandez is a 68 y.o. female and presents with ED Follow-up (8/22/22 ED [facility] for burning in legs)  . Subjective:  Mrs. Lane Saravia presents today accompanied by her daughter for follow-up after being seen in the emergency room earlier today for burning in her legs. She has recently been evaluated for some anxiety and was started on 5 mg of Lexapro. She was taking Remeron 15 mg nightly for insomnia. This worked well on the first night but since that time she notes that she wakes up early in the mornings and cannot get back to sleep. She continues to have ruminating thoughts and anxiety. Her labs were remarkable for a suppressed TSH and she is on levothyroxine. Her dose was adjusted down to 100 mcg daily and she is scheduled for follow-up thyroid profile in several weeks.     Past Medical History:   Diagnosis Date    Adult onset hypothyroidism 10/19/2017    Anxiety 7/27/2022    Arthritis     feet and hands    Back pain with radiation 10/19/2017    Breast cyst 6 months ago     right breast     Disorder of bone and cartilage 10/19/2017    GERD (gastroesophageal reflux disease)     High risk medication use 10/19/2017    Hip pain, acute, left 10/19/2017    hypercholesteremia     elevatd cholesterol    Hyperlipidemia 10/19/2017    Hypothyroid     hypothyroid    Hypothyroid 10/19/2017    Irritable bowel syndrome with constipation 10/19/2017    Knee pain 10/19/2017    Menopausal disorder 10/19/2017    Menopause     Migraine 10/19/2017    Migraines     Neurological disorder     migraines    On statin therapy 6/18/2019    Urinary frequency 10/19/2017     Past Surgical History:   Procedure Laterality Date    COLONOSCOPY Left 6/1/2018    COLONOSCOPY performed by Jaye Ling MD at [facility] ENDOSCOPY    HX BREAST BIOPSY Left 11 years ago     negative    HX CATARACT REMOVAL Left 07/2020    HX GYN      hysterectomy    HX HYSTERECTOMY      HX OOPHORECTOMY      HX ORTHOPAEDIC  11/9/11    SPINE LUMBAR LAMINECTOMY - L4-5 LAMINECTOMY FOR REMOVAL OF EXTRA DURAL LESION ON THE LEFT - benign    HX OTHER SURGICAL      ganglion cyst removed finger    HX TONSILLECTOMY      [place] ABDOMEN SURGERY PROC UNLISTED  2011    removed \"sludge\" from duct in gallbladder     Allergies   Allergen Reactions    Latex Rash     Bandages causes a rash if left on for more than a couple of days. She buys latex free band-aids. Codeine Nausea and Vomiting     Current Outpatient Medications   Medication Sig Dispense Refill    ALPRAZolam (XANAX) 0.25 mg tablet Take 1 Tablet by mouth three (3) times daily as needed for Anxiety. Max Daily Amount: 0.75 mg. 30 Tablet 0    escitalopram oxalate (LEXAPRO) 10 mg tablet Take 1 Tablet by mouth daily. Indications: repeated episodes of anxiety 90 Tablet 1    levothyroxine (SYNTHROID) 100 mcg tablet Take 1 Tablet by mouth Daily (before breakfast). 90 Tablet 0    ergocalciferol (ERGOCALCIFEROL) 1,250 mcg (50,000 unit) capsule Take 1 Capsule by mouth every seven (7) days. 12 Capsule 0    simvastatin (ZOCOR) 20 mg tablet TAKE 1 TABLET BY MOUTH EVERY DAY 90 Tablet 3    omeprazole (PRILOSEC OTC) 20 mg tablet Take 20 mg by mouth daily. melatonin (MELATIN PO) Take  by mouth nightly as needed. (Patient not taking: Reported on 8/22/2022)       Social History     Socioeconomic History    Marital status:    Tobacco Use    Smoking status: Never    Smokeless tobacco: Never   Vaping Use    Vaping Use: Never used   Substance and Sexual Activity    Alcohol use: Yes     Comment: rarely    Drug use: No    Sexual activity: Yes     Partners: Male     Birth control/protection: Surgical     Family History   Problem Relation Age of Onset    Heart Disease Mother         \"shrinking heart valve\"       Review of Systems  Constitutional:  negative for fevers, chills, anorexia and weight loss  Eyes:    negative for visual disturbance and irritation  ENT:    negative for tinnitus,sore throat,nasal congestion,ear pains. hoarseness  Respiratory: negative for cough, hemoptysis, dyspnea,wheezing  CV:    negative for chest pain, palpitations, lower extremity edema  GI:    negative for nausea, vomiting, diarrhea, abdominal pain,melena  Endo:               negative for polyuria,polydipsia,polyphagia,heat intolerance  Genitourinary : negative for frequency, dysuria and hematuria  Integumentary: negative for rash and pruritus  Hematologic:   negative for easy bruising and gum/nose bleeding  Musculoskel:  negative for myalgias, arthralgias, back pain, muscle weakness, joint pain  Neurological:   negative for headaches, dizziness, vertigo, memory problems and gait   Behavl/Psych: Positive for feelings of anxiety, depression, mood changes  ROS otherwise negative      Objective:  Visit Vitals  /88 (BP 1 Location: Left upper arm, BP Patient Position: Sitting, BP Cuff Size: Adult)   Pulse 82   Temp 98.1 °F (36.7 °C) (Oral)   Resp 17   Ht 5' 2\" (1.575 m)   Wt 159 lb 12.8 oz (72.5 kg)   LMP  (LMP Unknown)   SpO2 97%   BMI 29.23 kg/m²     Physical Exam:   General appearance - alert, well appearing, and in no distress, mild pressured speech  Mental status - alert, oriented to person, place, and time  EYE-NIEVES, EOMI, fundi normal, corneas normal, no foreign bodies  ENT-ENT exam normal, no neck nodes or sinus tenderness  Nose - normal and patent, no erythema, discharge or polyps  Mouth - mucous membranes moist, pharynx normal without lesions  Neck - supple, no significant adenopathy   Chest - clear to auscultation, no wheezes, rales or rhonchi, symmetric air entry   Heart - normal rate, regular rhythm, normal S1, S2, no murmurs, rubs, clicks or gallops   Abdomen - soft, nontender, nondistended, no masses or organomegaly  Lymph- no adenopathy palpable  Ext-peripheral pulses normal, no pedal edema, no clubbing or cyanosis  Skin-Warm and dry.  no hyperpigmentation, vitiligo, or suspicious lesions  Neuro -alert, oriented, normal speech, no focal findings or movement disorder noted      Assessment/Plan:  Diagnoses and all orders for this visit:    1. Anxiety  -     ALPRAZolam (XANAX) 0.25 mg tablet; Take 1 Tablet by mouth three (3) times daily as needed for Anxiety. Max Daily Amount: 0.75 mg. Other orders  -     escitalopram oxalate (LEXAPRO) 10 mg tablet; Take 1 Tablet by mouth daily. Indications: repeated episodes of anxiety        ICD-10-CM ICD-9-CM    1. Anxiety  F41.9 300.00 ALPRAZolam (XANAX) 0.25 mg tablet          Plan:  Increase Lexapro to 10 mg daily. We discussed taking hydroxyzine at nighttime for sleep however it does have a potential interaction with Lexapro in terms of increasing the QTc interval.  As such she will be placed on alprazolam 0.25 mg 3 times daily as needed. She may also take this at nighttime to help her sleep if needed. She will not continue this medication indefinitely so I am not concerned about benzodiazepine dependence. I have reviewed with the patient details of the assessment and plan and all questions were answered. Relevent patient education was performed. Verbal and/or written instructions (see AVS) provided. The most recent lab findings were reviewed with the patient. Plan was discussed with patient who verbally expressed understanding. An After Visit Summary was printed and given to the patient.     Chaitanya Pedro MD

## 2022-08-22 NOTE — PROGRESS NOTES
Chief Complaint   Patient presents with    ED Follow-up     8/22/22 ED [facility] for burning in legs     Visit Vitals  /88 (BP 1 Location: Left upper arm, BP Patient Position: Sitting, BP Cuff Size: Adult)   Pulse 82   Temp 98.1 °F (36.7 °C) (Oral)   Resp 17   Ht 5' 2\" (1.575 m)   Wt 159 lb 12.8 oz (72.5 kg)   LMP  (LMP Unknown)   SpO2 97%   BMI 29.23 kg/m²     1. Have you been to the ER, urgent care clinic since your last visit? Hospitalized since your last visit? 8/22/22 ED [facility] for bilateral leg/arm burning    2. Have you seen or consulted any other health care providers outside of the [address] [place] [address] since your last visit? Include any pap smears or colon screening.  No

## 2022-08-22 NOTE — ED PROVIDER NOTES
EMERGENCY DEPARTMENT HISTORY AND PHYSICAL EXAM      Date: 8/22/2022  Patient Name: Zazmam Lynch    History of Presenting Illness     Chief Complaint   Patient presents with    Other     Patient stated that she has been having burning sensations in her arms and legs for the last few weeks which is causing her to lose sleep. Her doctor gave her something for sleep which has helped but it was for anxiety and hasn't helped with the burning sensations. She had the burning sensations before she was placed on this new medication. Denies any injury or skin abnormalities. History Provided By: Patient    HPI: Zamzam Lynch, 68 y.o. female with a past medical history as noted below who presents emergency department with bilateral upper and lower extremity burning sensations. This is been present for the last 6 weeks. It has been intermittent. She feels like there is an ascending \"burning sensation inside of her legs\". It has worsened over the past 10 days. It has been particularly problematic at night. Is better with movement. Her PCP recently lowered her levothyroxine dosing 6 weeks ago. She was placed on mirtazapine 1 week ago for restless leg syndrome. She denies any new symptoms, include dizziness, headache, chest pain, shortness of breath, extremity swelling/redness, rash, nausea, vomiting abdominal pain, diarrhea. She has not been using any new soaps or detergents. She denies any toxic irritants. There are no other complaints, changes, or physical findings at this time. PCP: Chon Mendoza MD    No current facility-administered medications on file prior to encounter. Current Outpatient Medications on File Prior to Encounter   Medication Sig Dispense Refill    escitalopram oxalate (LEXAPRO) 5 mg tablet Take 1 Tablet by mouth daily. 30 Tablet 1    levothyroxine (SYNTHROID) 100 mcg tablet Take 1 Tablet by mouth Daily (before breakfast).  90 Tablet 0    ergocalciferol (ERGOCALCIFEROL) 1,250 mcg (50,000 unit) capsule Take 1 Capsule by mouth every seven (7) days. 12 Capsule 0    mirtazapine (REMERON) 15 mg tablet Take 1 Tablet by mouth nightly. 30 Tablet 0    melatonin (MELATIN PO) Take  by mouth nightly as needed. simvastatin (ZOCOR) 20 mg tablet TAKE 1 TABLET BY MOUTH EVERY DAY 90 Tablet 3    omeprazole (PRILOSEC OTC) 20 mg tablet Take 20 mg by mouth daily.          Past History     Past Medical History:  Past Medical History:   Diagnosis Date    Adult onset hypothyroidism 10/19/2017    Anxiety 7/27/2022    Arthritis     feet and hands    Back pain with radiation 10/19/2017    Breast cyst 6 months ago     right breast     Disorder of bone and cartilage 10/19/2017    GERD (gastroesophageal reflux disease)     High risk medication use 10/19/2017    Hip pain, acute, left 10/19/2017    hypercholesteremia     elevatd cholesterol    Hyperlipidemia 10/19/2017    Hypothyroid     hypothyroid    Hypothyroid 10/19/2017    Irritable bowel syndrome with constipation 10/19/2017    Knee pain 10/19/2017    Menopausal disorder 10/19/2017    Menopause     Migraine 10/19/2017    Migraines     Neurological disorder     migraines    On statin therapy 6/18/2019    Urinary frequency 10/19/2017       Past Surgical History:  Past Surgical History:   Procedure Laterality Date    COLONOSCOPY Left 6/1/2018    COLONOSCOPY performed by Amy Dent MD at [facility] ENDOSCOPY    [address] [facility] Left 11 years ago     negative    HX CATARACT REMOVAL Left 07/2020    HX GYN      hysterectomy    HX HYSTERECTOMY      HX OOPHORECTOMY      HX ORTHOPAEDIC  11/9/11    SPINE LUMBAR LAMINECTOMY - L4-5 LAMINECTOMY FOR REMOVAL OF EXTRA DURAL LESION ON THE LEFT - benign    HX OTHER SURGICAL      ganglion cyst removed finger    HX TONSILLECTOMY      VT ABDOMEN SURGERY PROC UNLISTED  2011    removed \"sludge\" from duct in gallbladder       Family History:  Family History   Problem Relation Age of Onset    Heart Disease Mother         \"shrinking heart valve\"       Social History:  Social History     Tobacco Use    Smoking status: Never    Smokeless tobacco: Never   Vaping Use    Vaping Use: Never used   Substance Use Topics    Alcohol use: Yes     Comment: rarely    Drug use: No       Allergies: Allergies   Allergen Reactions    Latex Rash     Bandages causes a rash if left on for more than a couple of days. She buys latex free band-aids. Codeine Nausea and Vomiting         Review of Systems   Review of Systems   Constitutional:  Negative for chills and fever. HENT:  Negative for congestion and sore throat. Respiratory:  Negative for cough and shortness of breath. Cardiovascular:  Negative for chest pain. Gastrointestinal:  Negative for abdominal pain, diarrhea, nausea and vomiting. Genitourinary:  Negative for dysuria and hematuria. Musculoskeletal:  Negative for myalgias. Skin:  Negative for rash. Neurological:  Negative for headaches. All other systems reviewed and are negative. Physical Exam   Physical Exam  Vitals and nursing note reviewed. Constitutional:       General: She is not in acute distress. Appearance: She is not toxic-appearing. HENT:      Head: Atraumatic. Right Ear: External ear normal.      Left Ear: External ear normal.      Nose: Nose normal.      Mouth/Throat:      Mouth: Mucous membranes are moist.   Eyes:      Extraocular Movements: Extraocular movements intact. Conjunctiva/sclera: Conjunctivae normal.   Cardiovascular:      Rate and Rhythm: Normal rate and regular rhythm. Pulses: Normal pulses. Heart sounds: Normal heart sounds. No murmur heard. No friction rub. No gallop. Comments: 2+ dorsalis pedis and posterior tibialis pulses bilaterally. 2+ radial pulses. Patient neurovascular intact in extremities distally. Pulmonary:      Effort: Pulmonary effort is normal. No respiratory distress. Breath sounds: Normal breath sounds. No stridor.  No wheezing, rhonchi or rales.   Abdominal:      General: Abdomen is flat. There is no distension. Palpations: Abdomen is soft. Tenderness: There is no abdominal tenderness. There is no guarding or rebound. Musculoskeletal:         General: No swelling. Cervical back: Neck supple. Skin:     General: Skin is warm. Comments: Skin normal to inspection with no lesions swelling or erythema   Neurological:      General: No focal deficit present. Mental Status: She is alert and oriented to person, place, and time. Comments: Patient moving all 4 extremities. Sensation intact in all 4 extremities. No ataxia. No incoordination. No speech deficits or facial asymmetry. Extraocular movements intact with no nystagmus. Psychiatric:         Mood and Affect: Mood normal.         Behavior: Behavior normal.         Thought Content: Thought content normal.         Judgment: Judgment normal.       Diagnostic Study Results     Labs -   No results found for this or any previous visit (from the past 12 hour(s)). Radiologic Studies -   No orders to display     CT Results  (Last 48 hours)      None          CXR Results  (Last 48 hours)      None              Medical Decision Making   I am the first provider for this patient. I reviewed the vital signs, available nursing notes, past medical history, past surgical history, family history and social history. Vital Signs-Reviewed the patient's vital signs. Patient Vitals for the past 12 hrs:   Temp Pulse Resp BP SpO2   08/22/22 0643 97.5 °F (36.4 °C) 86 18 (!) 196/99 96 %       Records Reviewed: Nursing Notes and Old Medical Records    Provider Notes (Medical Decision Making):   Patient presented with a 6-week history of episodic extremity burning sensation she was well-appearing, had no infectious symptoms, was afebrile, and had an unremarkable physical exam.  She is neurovascularly intact. She has no evidence of DVT, limb ischemia, or acute neurologic process. Blood work was unremarkable for any evidence of dehydration or electrolyte disturbance. She has an established appointment with her PCP this afternoon and given her multiple medications and risk for polypharmacy, she was advised on close follow-up with PCP to discuss her medications. She was advised on return precautions to the ED. Patient expressed understanding agreement the discharge instructions and treatment plan    ED Course:   Initial assessment performed. The patients presenting problems have been discussed, and they are in agreement with the care plan formulated and outlined with them. I have encouraged them to ask questions as they arise throughout their visit. Critical Care Time: None    Disposition:  discharged    PLAN:  1. Current Discharge Medication List        2. Follow-up Information    None       Return to ED if worse     Diagnosis     Clinical Impression: No diagnosis found. Please note that this dictation was completed with [employer], the computer voice recognition software. Quite often unanticipated grammatical, syntax, homophones, and other interpretive errors are inadvertently transcribed by the computer software. Please disregards these errors. Please excuse any errors that have escaped final proofreading.

## 2022-08-22 NOTE — DISCHARGE INSTRUCTIONS
Follow-up at your established PCP appointment this afternoon. Return to the emergency department sooner if you have any new concerning symptoms.

## 2022-08-23 ENCOUNTER — HOSPITAL ENCOUNTER (OUTPATIENT)
Dept: MAMMOGRAPHY | Age: 77
Discharge: HOME | End: 2022-08-23
Attending: INTERNAL MEDICINE
Payer: MEDICARE

## 2022-08-23 ENCOUNTER — TELEPHONE (OUTPATIENT)
Dept: INTERNAL MEDICINE CLINIC | Age: 77
End: 2022-08-23

## 2022-08-23 DIAGNOSIS — Z12.31 VISIT FOR SCREENING MAMMOGRAM: ICD-10-CM

## 2022-08-23 PROCEDURE — 77067 SCR MAMMO BI INCL CAD: CPT

## 2022-08-23 NOTE — TELEPHONE ENCOUNTER
Patients daughter Cassandra Severance called to let Dr. Villa Yañez know that her mother April Service will not take the xanax due to the side effects. Cassandra Severance wanted to know can her mother take Benadryl to help her sleep? If not what else can she take that does not have side effect on older patients? Please advise.

## 2022-09-07 ENCOUNTER — OFFICE VISIT (OUTPATIENT)
Dept: INTERNAL MEDICINE CLINIC | Age: 77
End: 2022-09-07
Payer: MEDICARE

## 2022-09-07 VITALS
BODY MASS INDEX: 28.71 KG/M2 | SYSTOLIC BLOOD PRESSURE: 128 MMHG | HEART RATE: 78 BPM | DIASTOLIC BLOOD PRESSURE: 80 MMHG | OXYGEN SATURATION: 98 % | RESPIRATION RATE: 18 BRPM | WEIGHT: 156 LBS | TEMPERATURE: 98.1 F | HEIGHT: 62 IN

## 2022-09-07 DIAGNOSIS — Z79.899 ON STATIN THERAPY: ICD-10-CM

## 2022-09-07 DIAGNOSIS — M79.604 PAIN IN BOTH LOWER EXTREMITIES: Primary | ICD-10-CM

## 2022-09-07 DIAGNOSIS — E03.9 ACQUIRED HYPOTHYROIDISM: ICD-10-CM

## 2022-09-07 DIAGNOSIS — E78.00 PURE HYPERCHOLESTEROLEMIA: ICD-10-CM

## 2022-09-07 DIAGNOSIS — M79.605 PAIN IN BOTH LOWER EXTREMITIES: Primary | ICD-10-CM

## 2022-09-07 PROCEDURE — 1101F PT FALLS ASSESS-DOCD LE1/YR: CPT | Performed by: INTERNAL MEDICINE

## 2022-09-07 PROCEDURE — 1090F PRES/ABSN URINE INCON ASSESS: CPT | Performed by: INTERNAL MEDICINE

## 2022-09-07 PROCEDURE — G8432 DEP SCR NOT DOC, RNG: HCPCS | Performed by: INTERNAL MEDICINE

## 2022-09-07 PROCEDURE — G8536 NO DOC ELDER MAL SCRN: HCPCS | Performed by: INTERNAL MEDICINE

## 2022-09-07 PROCEDURE — G8399 PT W/DXA RESULTS DOCUMENT: HCPCS | Performed by: INTERNAL MEDICINE

## 2022-09-07 PROCEDURE — G8427 DOCREV CUR MEDS BY ELIG CLIN: HCPCS | Performed by: INTERNAL MEDICINE

## 2022-09-07 PROCEDURE — G8417 CALC BMI ABV UP PARAM F/U: HCPCS | Performed by: INTERNAL MEDICINE

## 2022-09-07 PROCEDURE — 1123F ACP DISCUSS/DSCN MKR DOCD: CPT | Performed by: INTERNAL MEDICINE

## 2022-09-07 PROCEDURE — 99213 OFFICE O/P EST LOW 20 MIN: CPT | Performed by: INTERNAL MEDICINE

## 2022-09-07 NOTE — PROGRESS NOTES
Maxx Donahue is a 68 y.o. female and presents with Follow Up Chronic Condition (3 week fu)  . Subjective:  Mrs. Gayathri Aleman presents today for follow-up of several problems. Her Synthroid dose was decreased to 100 mcg daily and she will have a follow-up thyroid panel in the next couple of weeks. She has been placed on Lexapro 10 mg daily after initially being on 5 mg daily. She was prescribed alprazolam but did not take this because of potential side effects of benzodiazepines in elderly patients. She had been prescribed mirtazapine prior to this but did not feel that it was effective for her. She now describes burning in her legs but also describes an aching sensation or discomfort that is present all the time.     Past Medical History:   Diagnosis Date    Adult onset hypothyroidism 10/19/2017    Anxiety 7/27/2022    Arthritis     feet and hands    Back pain with radiation 10/19/2017    Breast cyst 6 months ago     right breast     Disorder of bone and cartilage 10/19/2017    GERD (gastroesophageal reflux disease)     High risk medication use 10/19/2017    Hip pain, acute, left 10/19/2017    hypercholesteremia     elevatd cholesterol    Hyperlipidemia 10/19/2017    Hypothyroid     hypothyroid    Hypothyroid 10/19/2017    Irritable bowel syndrome with constipation 10/19/2017    Knee pain 10/19/2017    Menopausal disorder 10/19/2017    Menopause     Migraine 10/19/2017    Migraines     Neurological disorder     migraines    On statin therapy 6/18/2019    Urinary frequency 10/19/2017     Past Surgical History:   Procedure Laterality Date    COLONOSCOPY Left 6/1/2018    COLONOSCOPY performed by Jose Mayer MD at [facility] ENDOSCOPY    HX BREAST BIOPSY Left 11 years ago     negative    HX CATARACT REMOVAL Left 07/2020    HX GYN      hysterectomy    HX HYSTERECTOMY      HX OOPHORECTOMY      HX ORTHOPAEDIC  11/9/11    SPINE LUMBAR LAMINECTOMY - L4-5 LAMINECTOMY FOR REMOVAL OF EXTRA DURAL LESION ON THE LEFT - benign    HX OTHER SURGICAL      ganglion cyst removed finger    HX TONSILLECTOMY      [facility] ABDOMEN SURGERY PROC UNLISTED  2011    removed \"sludge\" from duct in gallbladder     Allergies   Allergen Reactions    Latex Rash     Bandages causes a rash if left on for more than a couple of days. She buys latex free band-aids. Codeine Nausea and Vomiting     Current Outpatient Medications   Medication Sig Dispense Refill    escitalopram oxalate (LEXAPRO) 10 mg tablet Take 1 Tablet by mouth daily. Indications: repeated episodes of anxiety 90 Tablet 1    levothyroxine (SYNTHROID) 100 mcg tablet Take 1 Tablet by mouth Daily (before breakfast). 90 Tablet 0    ergocalciferol (ERGOCALCIFEROL) 1,250 mcg (50,000 unit) capsule Take 1 Capsule by mouth every seven (7) days. 12 Capsule 0    melatonin (MELATIN PO) Take  by mouth nightly as needed. simvastatin (ZOCOR) 20 mg tablet TAKE 1 TABLET BY MOUTH EVERY DAY 90 Tablet 3    omeprazole (PRILOSEC OTC) 20 mg tablet Take 20 mg by mouth daily. ALPRAZolam (XANAX) 0.25 mg tablet Take 1 Tablet by mouth three (3) times daily as needed for Anxiety. Max Daily Amount: 0.75 mg. (Patient not taking: Reported on 9/7/2022) 30 Tablet 0     Social History     Socioeconomic History    Marital status:    Tobacco Use    Smoking status: Never    Smokeless tobacco: Never   Vaping Use    Vaping Use: Never used   Substance and Sexual Activity    Alcohol use: Yes     Comment: rarely    Drug use: No    Sexual activity: Yes     Partners: Male     Birth control/protection: Surgical     Family History   Problem Relation Age of Onset    Heart Disease Mother         \"shrinking heart valve\"       Review of Systems  Constitutional:  negative for fevers, chills, anorexia and weight loss  Eyes:    negative for visual disturbance and irritation  ENT:    negative for tinnitus,sore throat,nasal congestion,ear pains. hoarseness  Respiratory:     negative for cough, hemoptysis, dyspnea,wheezing  CV: negative for chest pain, palpitations, lower extremity edema  GI:    negative for nausea, vomiting, diarrhea, abdominal pain,melena  Endo:               negative for polyuria,polydipsia,polyphagia,heat intolerance  Genitourinary : negative for frequency, dysuria and hematuria  Integumentary: negative for rash and pruritus  Hematologic:   negative for easy bruising and gum/nose bleeding  Musculoskel:  negative for myalgias, arthralgias, back pain, muscle weakness, joint pain  Neurological:   negative for headaches, dizziness, vertigo, memory problems and gait   Behavl/Psych:  negative for feelings of anxiety, depression, mood changes  ROS otherwise negative      Objective:  Visit Vitals  /80 (BP 1 Location: Left upper arm, BP Patient Position: Sitting, BP Cuff Size: Adult)   Pulse 78   Temp 98.1 °F (36.7 °C) (Oral)   Resp 18   Ht 5' 2\" (1.575 m)   Wt 156 lb (70.8 kg)   LMP  (LMP Unknown)   SpO2 98%   BMI 28.53 kg/m²     Physical Exam:   General appearance - alert, well appearing, and in no distress  Mental status - alert, oriented to person, place, and time  EYE-NIEVES, EOMI, fundi normal, corneas normal, no foreign bodies  ENT-ENT exam normal, no neck nodes or sinus tenderness  Nose - normal and patent, no erythema, discharge or polyps  Mouth - mucous membranes moist, pharynx normal without lesions  Neck - supple, no significant adenopathy   Chest - clear to auscultation, no wheezes, rales or rhonchi, symmetric air entry   Heart - normal rate, regular rhythm, normal S1, S2, no murmurs, rubs, clicks or gallops   Abdomen - soft, nontender, nondistended, no masses or organomegaly  Lymph- no adenopathy palpable  Ext-peripheral pulses normal, no pedal edema, no clubbing or cyanosis  Skin-Warm and dry. no hyperpigmentation, vitiligo, or suspicious lesions  Neuro -alert, oriented, normal speech, no focal findings or movement disorder noted      Assessment/Plan:  Diagnoses and all orders for this visit:    1.  Pain in both lower extremities  -     SED RATE (ESR); Future    2. Acquired hypothyroidism    3. Pure hypercholesterolemia    4. On statin therapy        ICD-10-CM ICD-9-CM    1. Pain in both lower extremities  M79.604 729.5 SED RATE (ESR)    M79.605  SED RATE (ESR)      2. Acquired hypothyroidism  E03.9 244.9       3. Pure hypercholesterolemia  E78.00 272.0       4. On statin therapy  Z79.899 V58.69         Plan:    The patient certainly does demonstrate some symptoms that may be consistent with restless leg syndrome. I would like to follow-up for thyroid profile and continue her current dose of escitalopram.  May consider addition of mirtazapine nightly. We will have a follow-up sed rate to rule out polymyalgia as a cause of her symptoms. If her sed rate is elevated this may be more consistent with an autoimmune process. Would also consider a side effect from being on statin therapy and we may consider having her hold simvastatin for the time being. We will make further recommendations based on her sed rate which should be resulted by tomorrow. I have reviewed with the patient details of the assessment and plan and all questions were answered. Relevent patient education was performed. Verbal and/or written instructions (see AVS) provided. The most recent lab findings were reviewed with the patient. Plan was discussed with patient who verbally expressed understanding. An After Visit Summary was printed and given to the patient.     Tiffanie Deng MD

## 2022-09-07 NOTE — PROGRESS NOTES
Sheeba Callejas is a 68 y.o. female presenting for Follow Up Chronic Condition (3 week fu)  . 1. Have you been to the ER, urgent care clinic since your last visit? Hospitalized since your last visit? No    2. Have you seen or consulted any other health care providers outside of the [address] [place] [address] since your last visit? Include any pap smears or colon screening. No    Fall Risk Assessment, last 12 mths 2/14/2022   Able to walk? Yes   Fall in past 12 months? 0   Do you feel unsteady? 0   Are you worried about falling 0   Number of falls in past 12 months -   Fall with injury? -         Abuse Screening Questionnaire 2/14/2022   Do you ever feel afraid of your partner? N   Are you in a relationship with someone who physically or mentally threatens you? N   Is it safe for you to go home? Y       3 most recent PHQ Screens 2/14/2022   Little interest or pleasure in doing things Not at all   Feeling down, depressed, irritable, or hopeless Not at all   Total Score PHQ 2 0       There are no discontinued medications.

## 2022-09-08 LAB — ERYTHROCYTE [SEDIMENTATION RATE] IN BLOOD: 11 MM/HR (ref 0–30)

## 2022-09-12 ENCOUNTER — TELEPHONE (OUTPATIENT)
Dept: INTERNAL MEDICINE CLINIC | Age: 77
End: 2022-09-12

## 2022-09-21 ENCOUNTER — OFFICE VISIT (OUTPATIENT)
Dept: INTERNAL MEDICINE CLINIC | Age: 77
End: 2022-09-21
Payer: MEDICARE

## 2022-09-21 VITALS
BODY MASS INDEX: 28.12 KG/M2 | TEMPERATURE: 98 F | WEIGHT: 152.8 LBS | HEIGHT: 62 IN | OXYGEN SATURATION: 98 % | HEART RATE: 80 BPM | DIASTOLIC BLOOD PRESSURE: 82 MMHG | SYSTOLIC BLOOD PRESSURE: 132 MMHG | RESPIRATION RATE: 16 BRPM

## 2022-09-21 DIAGNOSIS — F41.9 ANXIETY: ICD-10-CM

## 2022-09-21 DIAGNOSIS — E03.8 ADULT ONSET HYPOTHYROIDISM: Primary | ICD-10-CM

## 2022-09-21 DIAGNOSIS — Z79.899 ON STATIN THERAPY: ICD-10-CM

## 2022-09-21 DIAGNOSIS — G25.81 RLS (RESTLESS LEGS SYNDROME): ICD-10-CM

## 2022-09-21 PROCEDURE — G8432 DEP SCR NOT DOC, RNG: HCPCS | Performed by: INTERNAL MEDICINE

## 2022-09-21 PROCEDURE — 1090F PRES/ABSN URINE INCON ASSESS: CPT | Performed by: INTERNAL MEDICINE

## 2022-09-21 PROCEDURE — 99213 OFFICE O/P EST LOW 20 MIN: CPT | Performed by: INTERNAL MEDICINE

## 2022-09-21 PROCEDURE — G8536 NO DOC ELDER MAL SCRN: HCPCS | Performed by: INTERNAL MEDICINE

## 2022-09-21 PROCEDURE — 1123F ACP DISCUSS/DSCN MKR DOCD: CPT | Performed by: INTERNAL MEDICINE

## 2022-09-21 PROCEDURE — 1101F PT FALLS ASSESS-DOCD LE1/YR: CPT | Performed by: INTERNAL MEDICINE

## 2022-09-21 PROCEDURE — G8399 PT W/DXA RESULTS DOCUMENT: HCPCS | Performed by: INTERNAL MEDICINE

## 2022-09-21 PROCEDURE — G8427 DOCREV CUR MEDS BY ELIG CLIN: HCPCS | Performed by: INTERNAL MEDICINE

## 2022-09-21 PROCEDURE — G8417 CALC BMI ABV UP PARAM F/U: HCPCS | Performed by: INTERNAL MEDICINE

## 2022-09-21 RX ORDER — PRAMIPEXOLE DIHYDROCHLORIDE 0.5 MG/1
0.5 TABLET ORAL
Qty: 30 TABLET | Refills: 0 | Status: SHIPPED | OUTPATIENT
Start: 2022-09-21 | End: 2022-10-14

## 2022-09-21 NOTE — PROGRESS NOTES
Lori Lyons is a 68 y.o. female and presents with Follow Up Chronic Condition (1 week fu)  . Subjective:  Mrs. Lisa Owen presents today for follow-up. She states she does not really feel much better. She has not been sleeping well. She anticipates leg cramping every evening. She has felt anxious as well and since being on Lexapro 10 mg daily for the past week or so has not noticed any significant difference. She remains on levothyroxine 100 mcg daily and is due for follow-up TSH and T4 with a lab appointment next week. We did do a sed rate to rule out PMR which was negative thankfully.     Past Medical History:   Diagnosis Date    Adult onset hypothyroidism 10/19/2017    Anxiety 7/27/2022    Arthritis     feet and hands    Back pain with radiation 10/19/2017    Breast cyst 6 months ago     right breast     Disorder of bone and cartilage 10/19/2017    GERD (gastroesophageal reflux disease)     High risk medication use 10/19/2017    Hip pain, acute, left 10/19/2017    hypercholesteremia     elevatd cholesterol    Hyperlipidemia 10/19/2017    Hypothyroid     hypothyroid    Hypothyroid 10/19/2017    Irritable bowel syndrome with constipation 10/19/2017    Knee pain 10/19/2017    Menopausal disorder 10/19/2017    Menopause     Migraine 10/19/2017    Migraines     Neurological disorder     migraines    On statin therapy 6/18/2019    RLS (restless legs syndrome) 9/21/2022    Urinary frequency 10/19/2017     Past Surgical History:   Procedure Laterality Date    COLONOSCOPY Left 6/1/2018    COLONOSCOPY performed by Danni Wills MD at [facility] ENDOSCOPY    HX BREAST BIOPSY Left 11 years ago     negative    HX CATARACT REMOVAL Left 07/2020    HX GYN      hysterectomy    HX HYSTERECTOMY      HX OOPHORECTOMY      HX ORTHOPAEDIC  11/9/11    SPINE LUMBAR LAMINECTOMY - L4-5 LAMINECTOMY FOR REMOVAL OF EXTRA DURAL LESION ON THE LEFT - benign    HX OTHER SURGICAL      ganglion cyst removed finger    HX TONSILLECTOMY NC ABDOMEN SURGERY PROC UNLISTED  2011    removed \"sludge\" from duct in gallbladder     Allergies   Allergen Reactions    Latex Rash     Bandages causes a rash if left on for more than a couple of days. She buys latex free band-aids. Codeine Nausea and Vomiting     Current Outpatient Medications   Medication Sig Dispense Refill    pramipexole (MIRAPEX) 0.5 mg tablet Take 1 Tablet by mouth nightly. Indications: restless legs syndrome, an extreme discomfort in the calf muscles when sitting or lying down 30 Tablet 0    escitalopram oxalate (LEXAPRO) 10 mg tablet Take 1 Tablet by mouth daily. Indications: repeated episodes of anxiety 90 Tablet 1    levothyroxine (SYNTHROID) 100 mcg tablet Take 1 Tablet by mouth Daily (before breakfast). 90 Tablet 0    ergocalciferol (ERGOCALCIFEROL) 1,250 mcg (50,000 unit) capsule Take 1 Capsule by mouth every seven (7) days. 12 Capsule 0    melatonin (MELATIN PO) Take  by mouth nightly as needed. omeprazole (PRILOSEC OTC) 20 mg tablet Take 20 mg by mouth daily. ALPRAZolam (XANAX) 0.25 mg tablet Take 1 Tablet by mouth three (3) times daily as needed for Anxiety. Max Daily Amount: 0.75 mg.  (Patient not taking: No sig reported) 30 Tablet 0    simvastatin (ZOCOR) 20 mg tablet TAKE 1 TABLET BY MOUTH EVERY DAY (Patient not taking: No sig reported) 90 Tablet 3     Social History     Socioeconomic History    Marital status:    Tobacco Use    Smoking status: Never    Smokeless tobacco: Never   Vaping Use    Vaping Use: Never used   Substance and Sexual Activity    Alcohol use: Yes     Comment: rarely    Drug use: No    Sexual activity: Yes     Partners: Male     Birth control/protection: Surgical     Family History   Problem Relation Age of Onset    Heart Disease Mother         \"shrinking heart valve\"       Review of Systems  Constitutional:  negative for fevers, chills, anorexia and weight loss  Eyes:    negative for visual disturbance and irritation  ENT:    negative for tinnitus,sore throat,nasal congestion,ear pains. hoarseness  Respiratory:     negative for cough, hemoptysis, dyspnea,wheezing  CV:    negative for chest pain, palpitations, lower extremity edema  GI:    negative for nausea, vomiting, diarrhea, abdominal pain,melena  Endo:               negative for polyuria,polydipsia,polyphagia,heat intolerance  Genitourinary : negative for frequency, dysuria and hematuria  Integumentary: negative for rash and pruritus  Hematologic:   negative for easy bruising and gum/nose bleeding  Musculoskel:  negative for arthralgias, back pain, muscle weakness, joint pain  Neurological:   negative for headaches, dizziness, vertigo, memory problems and gait   Behavl/Psych:  negative for feelings of depression, mood changes  ROS otherwise negative      Objective:  Visit Vitals  /82 (BP 1 Location: Left upper arm, BP Patient Position: Sitting, BP Cuff Size: Adult)   Pulse 80   Temp 98 °F (36.7 °C) (Oral)   Resp 16   Ht 5' 2\" (1.575 m)   Wt 152 lb 12.8 oz (69.3 kg)   LMP  (LMP Unknown)   SpO2 98%   BMI 27.95 kg/m²     Physical Exam:   General appearance - alert, well appearing, and in no distress  Mental status - alert, oriented to person, place, and time  EYE-NIEVES, EOMI, fundi normal, corneas normal, no foreign bodies  ENT-ENT exam normal, no neck nodes or sinus tenderness  Nose - normal and patent, no erythema, discharge or polyps  Mouth - mucous membranes moist, pharynx normal without lesions  Neck - supple, no significant adenopathy   Chest - clear to auscultation, no wheezes, rales or rhonchi, symmetric air entry   Heart - normal rate, regular rhythm, normal S1, S2, no murmurs, rubs, clicks or gallops   Abdomen - soft, nontender, nondistended, no masses or organomegaly  Lymph- no adenopathy palpable  Ext-peripheral pulses normal, no pedal edema, no clubbing or cyanosis  Skin-Warm and dry.  no hyperpigmentation, vitiligo, or suspicious lesions  Neuro -alert, oriented, normal speech, no focal findings or movement disorder noted      Assessment/Plan:  Diagnoses and all orders for this visit:    1. Adult onset hypothyroidism    2. Anxiety    3. On statin therapy    4. RLS (restless legs syndrome)    Other orders  -     pramipexole (MIRAPEX) 0.5 mg tablet; Take 1 Tablet by mouth nightly. Indications: restless legs syndrome, an extreme discomfort in the calf muscles when sitting or lying down        ICD-10-CM ICD-9-CM    1. Adult onset hypothyroidism  E03.8 244.8       2. Anxiety  F41.9 300.00       3. On statin therapy  Z79.899 V58.69       4. RLS (restless legs syndrome)  G25.81 333.94         Plan:    The patient is experiencing some leg cramps and discomfort in her legs at night. She will be tried on Mirapex nightly to see if she derives any benefit from this medication. I would also consider whether being on a statin is causing her to have a statin induced myopathy. May consider holding simvastatin if symptoms do not improve by follow-up next week. Since she is scheduled for lab work next week I will see her at that time to see how she has progressed. She will continue Lexapro at its current dose. Follow-up and Dispositions    Return for as scheduled. I have reviewed with the patient details of the assessment and plan and all questions were answered. Relevent patient education was performed. Verbal and/or written instructions (see AVS) provided. The most recent lab findings were reviewed with the patient. Plan was discussed with patient who verbally expressed understanding. An After Visit Summary was printed and given to the patient.     Tiffanie Deng MD

## 2022-09-21 NOTE — PROGRESS NOTES
Angelika Ronchvalentín is a 68 y.o. female presenting for Follow Up Chronic Condition (1 week fu)  . 1. Have you been to the ER, urgent care clinic since your last visit? Hospitalized since your last visit? No    2. Have you seen or consulted any other health care providers outside of the [address] [place] [address] since your last visit? Include any pap smears or colon screening. No    Fall Risk Assessment, last 12 mths 2/14/2022   Able to walk? Yes   Fall in past 12 months? 0   Do you feel unsteady? 0   Are you worried about falling 0   Number of falls in past 12 months -   Fall with injury? -         Abuse Screening Questionnaire 2/14/2022   Do you ever feel afraid of your partner? N   Are you in a relationship with someone who physically or mentally threatens you? N   Is it safe for you to go home? Y       3 most recent PHQ Screens 2/14/2022   Little interest or pleasure in doing things Not at all   Feeling down, depressed, irritable, or hopeless Not at all   Total Score PHQ 2 0       There are no discontinued medications.

## 2022-09-28 ENCOUNTER — OFFICE VISIT (OUTPATIENT)
Dept: INTERNAL MEDICINE CLINIC | Age: 77
End: 2022-09-28
Payer: MEDICARE

## 2022-09-28 VITALS
SYSTOLIC BLOOD PRESSURE: 122 MMHG | DIASTOLIC BLOOD PRESSURE: 70 MMHG | TEMPERATURE: 98.2 F | OXYGEN SATURATION: 98 % | HEART RATE: 77 BPM | RESPIRATION RATE: 18 BRPM | WEIGHT: 152.4 LBS | HEIGHT: 62 IN | BODY MASS INDEX: 28.05 KG/M2

## 2022-09-28 DIAGNOSIS — G25.81 RLS (RESTLESS LEGS SYNDROME): ICD-10-CM

## 2022-09-28 DIAGNOSIS — F41.9 ANXIETY: ICD-10-CM

## 2022-09-28 DIAGNOSIS — E03.8 ADULT ONSET HYPOTHYROIDISM: Primary | ICD-10-CM

## 2022-09-28 PROCEDURE — G8432 DEP SCR NOT DOC, RNG: HCPCS | Performed by: INTERNAL MEDICINE

## 2022-09-28 PROCEDURE — 1090F PRES/ABSN URINE INCON ASSESS: CPT | Performed by: INTERNAL MEDICINE

## 2022-09-28 PROCEDURE — 1123F ACP DISCUSS/DSCN MKR DOCD: CPT | Performed by: INTERNAL MEDICINE

## 2022-09-28 PROCEDURE — 99213 OFFICE O/P EST LOW 20 MIN: CPT | Performed by: INTERNAL MEDICINE

## 2022-09-28 PROCEDURE — G8417 CALC BMI ABV UP PARAM F/U: HCPCS | Performed by: INTERNAL MEDICINE

## 2022-09-28 PROCEDURE — G8399 PT W/DXA RESULTS DOCUMENT: HCPCS | Performed by: INTERNAL MEDICINE

## 2022-09-28 PROCEDURE — G8536 NO DOC ELDER MAL SCRN: HCPCS | Performed by: INTERNAL MEDICINE

## 2022-09-28 PROCEDURE — G8427 DOCREV CUR MEDS BY ELIG CLIN: HCPCS | Performed by: INTERNAL MEDICINE

## 2022-09-28 PROCEDURE — 1101F PT FALLS ASSESS-DOCD LE1/YR: CPT | Performed by: INTERNAL MEDICINE

## 2022-09-28 NOTE — PROGRESS NOTES
Zamzam Lynch is a 68 y.o. female and presents with Follow Up Chronic Condition (1 week fu)  . Subjective:  Mrs. Krystina Ramirez presents today for follow-up of anxiety, restless leg syndrome, and hypothyroidism. Her TSH was suppressed and she had presented with increasing anxiety. We have adjusted her levothyroxine to 100 mcg daily. She is due for follow-up thyroid panel today. She was started on Mirapex for restless leg syndrome and notes this has been helpful. She is only been on the Lexapro now for a couple of weeks and it may be a little early to tell how she is doing with this but she feels like she is doing somewhat better.     Past Medical History:   Diagnosis Date    Adult onset hypothyroidism 10/19/2017    Anxiety 7/27/2022    Arthritis     feet and hands    Back pain with radiation 10/19/2017    Breast cyst 6 months ago     right breast     Disorder of bone and cartilage 10/19/2017    GERD (gastroesophageal reflux disease)     High risk medication use 10/19/2017    Hip pain, acute, left 10/19/2017    hypercholesteremia     elevatd cholesterol    Hyperlipidemia 10/19/2017    Hypothyroid     hypothyroid    Hypothyroid 10/19/2017    Irritable bowel syndrome with constipation 10/19/2017    Knee pain 10/19/2017    Menopausal disorder 10/19/2017    Menopause     Migraine 10/19/2017    Migraines     Neurological disorder     migraines    On statin therapy 6/18/2019    RLS (restless legs syndrome) 9/21/2022    Urinary frequency 10/19/2017     Past Surgical History:   Procedure Laterality Date    COLONOSCOPY Left 6/1/2018    COLONOSCOPY performed by Saul Ho MD at [facility] ENDOSCOPY    HX BREAST BIOPSY Left 11 years ago     negative    HX CATARACT REMOVAL Left 07/2020    HX GYN      hysterectomy    HX HYSTERECTOMY      HX OOPHORECTOMY      HX ORTHOPAEDIC  11/9/11    SPINE LUMBAR LAMINECTOMY - L4-5 LAMINECTOMY FOR REMOVAL OF EXTRA DURAL LESION ON THE LEFT - benign    HX OTHER SURGICAL      ganglion cyst removed finger    HX TONSILLECTOMY      ME ABDOMEN SURGERY PROC UNLISTED  2011    removed \"sludge\" from duct in gallbladder     Allergies   Allergen Reactions    Latex Rash     Bandages causes a rash if left on for more than a couple of days. She buys latex free band-aids. Codeine Nausea and Vomiting     Current Outpatient Medications   Medication Sig Dispense Refill    pramipexole (MIRAPEX) 0.5 mg tablet Take 1 Tablet by mouth nightly. Indications: restless legs syndrome, an extreme discomfort in the calf muscles when sitting or lying down 30 Tablet 0    escitalopram oxalate (LEXAPRO) 10 mg tablet Take 1 Tablet by mouth daily. Indications: repeated episodes of anxiety 90 Tablet 1    levothyroxine (SYNTHROID) 100 mcg tablet Take 1 Tablet by mouth Daily (before breakfast). 90 Tablet 0    ergocalciferol (ERGOCALCIFEROL) 1,250 mcg (50,000 unit) capsule Take 1 Capsule by mouth every seven (7) days. 12 Capsule 0    melatonin (MELATIN PO) Take  by mouth nightly as needed. omeprazole (PRILOSEC OTC) 20 mg tablet Take 20 mg by mouth daily. ALPRAZolam (XANAX) 0.25 mg tablet Take 1 Tablet by mouth three (3) times daily as needed for Anxiety. Max Daily Amount: 0.75 mg.  (Patient not taking: No sig reported) 30 Tablet 0    simvastatin (ZOCOR) 20 mg tablet TAKE 1 TABLET BY MOUTH EVERY DAY (Patient not taking: No sig reported) 90 Tablet 3     Social History     Socioeconomic History    Marital status:    Tobacco Use    Smoking status: Never    Smokeless tobacco: Never   Vaping Use    Vaping Use: Never used   Substance and Sexual Activity    Alcohol use: Yes     Comment: rarely    Drug use: No    Sexual activity: Yes     Partners: Male     Birth control/protection: Surgical     Family History   Problem Relation Age of Onset    Heart Disease Mother         \"shrinking heart valve\"       Review of Systems  Constitutional:  negative for fevers, chills, anorexia and weight loss  Eyes:    negative for visual disturbance and irritation  ENT:    negative for tinnitus,sore throat,nasal congestion,ear pains. hoarseness  Respiratory:     negative for cough, hemoptysis, dyspnea,wheezing  CV:    negative for chest pain, palpitations, lower extremity edema  GI:    negative for nausea, vomiting, diarrhea, abdominal pain,melena  Endo:               negative for polyuria,polydipsia,polyphagia,heat intolerance  Genitourinary : negative for frequency, dysuria and hematuria  Integumentary: negative for rash and pruritus  Hematologic:   negative for easy bruising and gum/nose bleeding  Musculoskel:  negative for  arthralgias, back pain, muscle weakness, joint pain  Neurological:   negative for headaches, dizziness, vertigo, memory problems and gait   Behavl/Psych:  negative for feelings of anxiety, depression, mood changes  ROS otherwise negative      Objective:  Visit Vitals  /70 (BP 1 Location: Left upper arm, BP Patient Position: Sitting, BP Cuff Size: Adult)   Pulse 77   Temp 98.2 °F (36.8 °C) (Oral)   Resp 18   Ht 5' 2\" (1.575 m)   Wt 152 lb 6.4 oz (69.1 kg)   LMP  (LMP Unknown)   SpO2 98%   BMI 27.87 kg/m²     Physical Exam:   General appearance - alert, well appearing, and in no distress  Mental status - alert, oriented to person, place, and time  EYE-NIEVES, EOMI, fundi normal, corneas normal, no foreign bodies  ENT-ENT exam normal, no neck nodes or sinus tenderness  Nose - normal and patent, no erythema, discharge or polyps  Mouth - mucous membranes moist, pharynx normal without lesions  Neck - supple, no significant adenopathy   Chest - clear to auscultation, no wheezes, rales or rhonchi, symmetric air entry   Heart - normal rate, regular rhythm, normal S1, S2, no murmurs, rubs, clicks or gallops   Abdomen - soft, nontender, nondistended, no masses or organomegaly  Lymph- no adenopathy palpable  Ext-peripheral pulses normal, no pedal edema, no clubbing or cyanosis  Skin-Warm and dry.  no hyperpigmentation, vitiligo, or suspicious lesions  Neuro -alert, oriented, normal speech, no focal findings or movement disorder noted      Assessment/Plan:  Diagnoses and all orders for this visit:    1. Adult onset hypothyroidism  -     T4, FREE; Future  -     TSH 3RD GENERATION; Future    2. RLS (restless legs syndrome)    3. Anxiety        ICD-10-CM ICD-9-CM    1. Adult onset hypothyroidism  E03.8 244.8 T4, FREE      TSH 3RD GENERATION      2. RLS (restless legs syndrome)  G25.81 333.94       3. Anxiety  F41.9 300.00         Plan:    Continue current medical regimen as outlined above. We can increase her Mirapex if needed. She will remain on Lexapro 10 mg daily currently for anxiety. We have adjusted her levothyroxine dose to 100 mcg daily for the past several weeks and we will make further recommendations based on her thyroid panel today. Follow-up to be determined. I have reviewed with the patient details of the assessment and plan and all questions were answered. Relevent patient education was performed. Verbal and/or written instructions (see AVS) provided. The most recent lab findings were reviewed with the patient. Plan was discussed with patient who verbally expressed understanding. An After Visit Summary was printed and given to the patient.     Vilma Loomis MD

## 2022-09-28 NOTE — PROGRESS NOTES
Eren Tello is a 68 y.o. female presenting for Follow Up Chronic Condition (1 week fu)  . 1. Have you been to the ER, urgent care clinic since your last visit? Hospitalized since your last visit? No    2. Have you seen or consulted any other health care providers outside of the [address] [place] [address] since your last visit? Include any pap smears or colon screening. No    Fall Risk Assessment, last 12 mths 2/14/2022   Able to walk? Yes   Fall in past 12 months? 0   Do you feel unsteady? 0   Are you worried about falling 0   Number of falls in past 12 months -   Fall with injury? -         Abuse Screening Questionnaire 2/14/2022   Do you ever feel afraid of your partner? N   Are you in a relationship with someone who physically or mentally threatens you? N   Is it safe for you to go home? Y       3 most recent PHQ Screens 2/14/2022   Little interest or pleasure in doing things Not at all   Feeling down, depressed, irritable, or hopeless Not at all   Total Score PHQ 2 0       There are no discontinued medications.

## 2022-09-29 ENCOUNTER — TELEPHONE (OUTPATIENT)
Dept: INTERNAL MEDICINE CLINIC | Age: 77
End: 2022-09-29

## 2022-09-29 LAB
T4 FREE SERPL-MCNC: 1.3 NG/DL (ref 0.8–1.5)
TSH SERPL DL<=0.05 MIU/L-ACNC: 1.58 UIU/ML (ref 0.36–3.74)

## 2022-09-29 RX ORDER — ESCITALOPRAM OXALATE 20 MG/1
20 TABLET ORAL DAILY
Qty: 90 TABLET | Refills: 0 | Status: SHIPPED | OUTPATIENT
Start: 2022-09-29

## 2022-09-29 NOTE — PROGRESS NOTES
Your follow-up thyroid function is in normal range. Continue your current dose of thyroid medication.

## 2022-11-02 RX ORDER — ERGOCALCIFEROL 1.25 MG/1
50000 CAPSULE ORAL
Qty: 12 CAPSULE | Refills: 0 | Status: SHIPPED | OUTPATIENT
Start: 2022-11-02

## 2022-11-09 RX ORDER — LEVOTHYROXINE SODIUM 100 UG/1
100 TABLET ORAL
Qty: 90 TABLET | Refills: 3 | Status: SHIPPED | OUTPATIENT
Start: 2022-11-09

## 2022-11-09 NOTE — TELEPHONE ENCOUNTER
Last Refill: 8/17/22  Last Visit: 9/28/2022   Next Visit: 2/17/23    Requested Prescriptions     Pending Prescriptions Disp Refills    levothyroxine (SYNTHROID) 100 mcg tablet 90 Tablet 0     Sig: Take 1 Tablet by mouth Daily (before breakfast).

## 2022-12-20 RX ORDER — ESCITALOPRAM OXALATE 20 MG/1
20 TABLET ORAL DAILY
Qty: 90 TABLET | Refills: 0 | Status: SHIPPED | OUTPATIENT
Start: 2022-12-20

## 2022-12-20 NOTE — TELEPHONE ENCOUNTER
Last Refill: 9/29/22  Last Visit: 9/28/2022   Next Visit: 2/17/2023    Requested Prescriptions     Pending Prescriptions Disp Refills    escitalopram oxalate (LEXAPRO) 20 mg tablet 90 Tablet 0     Sig: Take 1 Tablet by mouth daily.  Indications: repeated episodes of anxiety

## 2023-02-17 ENCOUNTER — OFFICE VISIT (OUTPATIENT)
Dept: INTERNAL MEDICINE CLINIC | Age: 78
End: 2023-02-17
Payer: MEDICARE

## 2023-02-17 VITALS
HEIGHT: 62 IN | HEART RATE: 75 BPM | RESPIRATION RATE: 16 BRPM | TEMPERATURE: 98.1 F | BODY MASS INDEX: 31.06 KG/M2 | SYSTOLIC BLOOD PRESSURE: 120 MMHG | DIASTOLIC BLOOD PRESSURE: 76 MMHG | OXYGEN SATURATION: 98 % | WEIGHT: 168.8 LBS

## 2023-02-17 DIAGNOSIS — G25.81 RLS (RESTLESS LEGS SYNDROME): ICD-10-CM

## 2023-02-17 DIAGNOSIS — Z13.1 SCREENING FOR DIABETES MELLITUS: ICD-10-CM

## 2023-02-17 DIAGNOSIS — M79.604 PAIN IN BOTH LOWER EXTREMITIES: ICD-10-CM

## 2023-02-17 DIAGNOSIS — N39.46 MIXED STRESS AND URGE URINARY INCONTINENCE: ICD-10-CM

## 2023-02-17 DIAGNOSIS — M79.605 PAIN IN BOTH LOWER EXTREMITIES: ICD-10-CM

## 2023-02-17 DIAGNOSIS — Z71.89 ADVANCED DIRECTIVES, COUNSELING/DISCUSSION: ICD-10-CM

## 2023-02-17 DIAGNOSIS — M35.00 SJOGREN'S SYNDROME, WITH UNSPECIFIED ORGAN INVOLVEMENT (HCC): ICD-10-CM

## 2023-02-17 DIAGNOSIS — Z79.899 ON STATIN THERAPY: ICD-10-CM

## 2023-02-17 DIAGNOSIS — E55.9 VITAMIN D DEFICIENCY: ICD-10-CM

## 2023-02-17 DIAGNOSIS — Z13.6 SCREENING FOR ISCHEMIC HEART DISEASE: ICD-10-CM

## 2023-02-17 DIAGNOSIS — F41.9 ANXIETY: ICD-10-CM

## 2023-02-17 DIAGNOSIS — Z00.00 MEDICARE ANNUAL WELLNESS VISIT, SUBSEQUENT: Primary | ICD-10-CM

## 2023-02-17 DIAGNOSIS — R35.0 URINARY FREQUENCY: ICD-10-CM

## 2023-02-17 DIAGNOSIS — E03.9 ACQUIRED HYPOTHYROIDISM: ICD-10-CM

## 2023-02-17 DIAGNOSIS — K21.9 GASTROESOPHAGEAL REFLUX DISEASE WITHOUT ESOPHAGITIS: ICD-10-CM

## 2023-02-17 DIAGNOSIS — Z13.31 SCREENING FOR DEPRESSION: ICD-10-CM

## 2023-02-17 DIAGNOSIS — E78.00 PURE HYPERCHOLESTEROLEMIA: ICD-10-CM

## 2023-02-17 DIAGNOSIS — R53.83 FATIGUE, UNSPECIFIED TYPE: ICD-10-CM

## 2023-02-17 PROCEDURE — G8417 CALC BMI ABV UP PARAM F/U: HCPCS | Performed by: INTERNAL MEDICINE

## 2023-02-17 PROCEDURE — G8427 DOCREV CUR MEDS BY ELIG CLIN: HCPCS | Performed by: INTERNAL MEDICINE

## 2023-02-17 PROCEDURE — 1123F ACP DISCUSS/DSCN MKR DOCD: CPT | Performed by: INTERNAL MEDICINE

## 2023-02-17 PROCEDURE — G8510 SCR DEP NEG, NO PLAN REQD: HCPCS | Performed by: INTERNAL MEDICINE

## 2023-02-17 PROCEDURE — G8536 NO DOC ELDER MAL SCRN: HCPCS | Performed by: INTERNAL MEDICINE

## 2023-02-17 PROCEDURE — 1101F PT FALLS ASSESS-DOCD LE1/YR: CPT | Performed by: INTERNAL MEDICINE

## 2023-02-17 PROCEDURE — G0439 PPPS, SUBSEQ VISIT: HCPCS | Performed by: INTERNAL MEDICINE

## 2023-02-17 PROCEDURE — G8399 PT W/DXA RESULTS DOCUMENT: HCPCS | Performed by: INTERNAL MEDICINE

## 2023-02-17 NOTE — PROGRESS NOTES
Chief Complaint   Patient presents with    Follow Up Chronic Condition     6 mo fu    Annual Wellness Visit       Depression Risk Factor Screening:     3 most recent PHQ Screens 2/17/2023   Little interest or pleasure in doing things Not at all   Feeling down, depressed, irritable, or hopeless Not at all   Total Score PHQ 2 0       Functional Ability and Level of Safety:     Activities of Daily Living  ADL Assessment 2/17/2023   Feeding yourself No Help Needed   Getting from bed to chair No Help Needed   Getting dressed No Help Needed   Bathing or showering No Help Needed   Walk across the room (includes cane/walker) No Help Needed   Using the telphone No Help Needed   Taking your medications No Help Needed   Preparing meals No Help Needed   Managing money (expenses/bills) No Help Needed   Moderately strenuous housework (laundry) No Help Needed   Shopping for personal items (toiletries/medicines) No Help Needed   Shopping for groceries No Help Needed   Driving No Help Needed   Climbing a flight of stairs No Help Needed   Getting to places beyond walking distances No Help Needed       Fall Risk  Fall Risk Assessment, last 12 mths 2/17/2023   Able to walk? Yes   Fall in past 12 months? 0   Do you feel unsteady? 0   Are you worried about falling 0   Number of falls in past 12 months -   Fall with injury? -       Abuse Screen  Abuse Screening Questionnaire 2/17/2023   Do you ever feel afraid of your partner? N   Are you in a relationship with someone who physically or mentally threatens you? N   Is it safe for you to go home?  Y         Patient Care Team   Patient Care Team:  Linette Alexandra MD as PCP - General (Internal Medicine Physician)  Linette Alexandra MD as PCP - [facility] Empaneled Provider

## 2023-02-18 LAB
25(OH)D3 SERPL-MCNC: 17.9 NG/ML (ref 30–100)
ALBUMIN SERPL-MCNC: 3.7 G/DL (ref 3.5–5)
ALBUMIN/GLOB SERPL: 1.2 (ref 1.1–2.2)
ALP SERPL-CCNC: 81 U/L (ref 45–117)
ALT SERPL-CCNC: 17 U/L (ref 12–78)
ANION GAP SERPL CALC-SCNC: 5 MMOL/L (ref 5–15)
APPEARANCE UR: ABNORMAL
AST SERPL-CCNC: 18 U/L (ref 15–37)
BACTERIA URNS QL MICRO: NEGATIVE /HPF
BASOPHILS # BLD: 0.1 K/UL (ref 0–0.1)
BASOPHILS NFR BLD: 1 % (ref 0–1)
BILIRUB SERPL-MCNC: 0.5 MG/DL (ref 0.2–1)
BILIRUB UR QL: NEGATIVE
BUN SERPL-MCNC: 13 MG/DL (ref 6–20)
BUN/CREAT SERPL: 16 (ref 12–20)
CALCIUM SERPL-MCNC: 9.7 MG/DL (ref 8.5–10.1)
CHLORIDE SERPL-SCNC: 102 MMOL/L (ref 97–108)
CHOLEST SERPL-MCNC: 263 MG/DL
CO2 SERPL-SCNC: 31 MMOL/L (ref 21–32)
COLOR UR: ABNORMAL
CREAT SERPL-MCNC: 0.82 MG/DL (ref 0.55–1.02)
DIFFERENTIAL METHOD BLD: NORMAL
EOSINOPHIL # BLD: 0.2 K/UL (ref 0–0.4)
EOSINOPHIL NFR BLD: 3 % (ref 0–7)
EPITH CASTS URNS QL MICRO: ABNORMAL /LPF
ERYTHROCYTE [DISTWIDTH] IN BLOOD BY AUTOMATED COUNT: 11.9 % (ref 11.5–14.5)
GLOBULIN SER CALC-MCNC: 3.2 G/DL (ref 2–4)
GLUCOSE SERPL-MCNC: 97 MG/DL (ref 65–100)
GLUCOSE UR STRIP.AUTO-MCNC: NEGATIVE MG/DL
HCT VFR BLD AUTO: 40.4 % (ref 35–47)
HDLC SERPL-MCNC: 76 MG/DL
HDLC SERPL: 3.5 (ref 0–5)
HGB BLD-MCNC: 13 G/DL (ref 11.5–16)
HGB UR QL STRIP: ABNORMAL
HYALINE CASTS URNS QL MICRO: ABNORMAL /LPF (ref 0–5)
IMM GRANULOCYTES # BLD AUTO: 0 K/UL (ref 0–0.04)
IMM GRANULOCYTES NFR BLD AUTO: 0 % (ref 0–0.5)
KETONES UR QL STRIP.AUTO: NEGATIVE MG/DL
LDLC SERPL CALC-MCNC: 169.8 MG/DL (ref 0–100)
LEUKOCYTE ESTERASE UR QL STRIP.AUTO: NEGATIVE
LYMPHOCYTES # BLD: 2.5 K/UL (ref 0.8–3.5)
LYMPHOCYTES NFR BLD: 34 % (ref 12–49)
MCH RBC QN AUTO: 28.6 PG (ref 26–34)
MCHC RBC AUTO-ENTMCNC: 32.2 G/DL (ref 30–36.5)
MCV RBC AUTO: 89 FL (ref 80–99)
MONOCYTES # BLD: 0.6 K/UL (ref 0–1)
MONOCYTES NFR BLD: 8 % (ref 5–13)
NEUTS SEG # BLD: 4 K/UL (ref 1.8–8)
NEUTS SEG NFR BLD: 54 % (ref 32–75)
NITRITE UR QL STRIP.AUTO: NEGATIVE
NRBC # BLD: 0 K/UL (ref 0–0.01)
NRBC BLD-RTO: 0 PER 100 WBC
PH UR STRIP: 6.5 (ref 5–8)
PLATELET # BLD AUTO: 270 K/UL (ref 150–400)
PMV BLD AUTO: 10.3 FL (ref 8.9–12.9)
POTASSIUM SERPL-SCNC: 4.2 MMOL/L (ref 3.5–5.1)
PROT SERPL-MCNC: 6.9 G/DL (ref 6.4–8.2)
PROT UR STRIP-MCNC: NEGATIVE MG/DL
RBC # BLD AUTO: 4.54 M/UL (ref 3.8–5.2)
RBC #/AREA URNS HPF: ABNORMAL /HPF (ref 0–5)
SODIUM SERPL-SCNC: 138 MMOL/L (ref 136–145)
SP GR UR REFRACTOMETRY: 1.01 (ref 1–1.03)
T4 FREE SERPL-MCNC: 1 NG/DL (ref 0.8–1.5)
TRIGL SERPL-MCNC: 86 MG/DL (ref ?–150)
TSH SERPL DL<=0.05 MIU/L-ACNC: 10.5 UIU/ML (ref 0.36–3.74)
UROBILINOGEN UR QL STRIP.AUTO: 0.2 EU/DL (ref 0.2–1)
VLDLC SERPL CALC-MCNC: 17.2 MG/DL
WBC # BLD AUTO: 7.3 K/UL (ref 3.6–11)
WBC URNS QL MICRO: ABNORMAL /HPF (ref 0–4)

## 2023-02-27 NOTE — PATIENT INSTRUCTIONS
Medicare Wellness Visit, Female     The best way to live healthy is to have a lifestyle where you eat a well-balanced diet, exercise regularly, limit alcohol use, and quit all forms of tobacco/nicotine, if applicable. Regular preventive services are another way to keep healthy. Preventive services (vaccines, screening tests, monitoring & exams) can help personalize your care plan, which helps you manage your own care. Screening tests can find health problems at the earliest stages, when they are easiest to treat. [facility] follows the current, evidence-based guidelines published by the [facility] ([facility]) when recommending preventive services for our patients. Because we follow these guidelines, sometimes recommendations change over time as research supports it. (For example, mammograms used to be recommended annually. Even though Medicare will still pay for an annual mammogram, the newer guidelines recommend a mammogram every two years for women of average risk). Of course, you and your doctor may decide to screen more often for some diseases, based on your risk and your co-morbidities (chronic disease you are already diagnosed with). Preventive services for you include:  - Medicare offers their members a free annual wellness visit, which is time for you and your primary care provider to discuss and plan for your preventive service needs.  Take advantage of this benefit every year!    -Over the age of 72 should receive the recommended pneumonia vaccines.    -All adults should have a flu vaccine yearly.  -All adults should have a tetanus vaccine every 10 years.   -Over the age 48 should receive the shingles vaccines.        -All adults should be screened once for Hepatitis C.  -All adults age 38-68 who are overweight should have a diabetes screening test once every three years.   -Other screening tests and preventive services for persons with diabetes include: an eye exam to screen for diabetic retinopathy, a kidney function test, a foot exam, and stricter control over your cholesterol.   -Cardiovascular screening for adults with routine risk involves an electrocardiogram (ECG) at intervals determined by your doctor.     -Colorectal cancer screenings should be done for adults age 39-70 with no increased risk factors for colorectal cancer. There are a number of acceptable methods of screening for this type of cancer. Each test has its own benefits and drawbacks. Discuss with your doctor what is most appropriate for you during your annual wellness visit. The different tests include: colonoscopy (considered the best screening method), a fecal occult blood test, a fecal DNA test, and sigmoidoscopy.    -Lung cancer screening is recommended annually with a low dose CT scan for adults between age 54 and 68, who have smoked at least 30 pack years (equivalent of 1 pack per day for 30 days), and who is a current smoker or quit less than 15 years ago.    -A bone mass density test is recommended when a woman turns 65 to screen for osteoporosis. This test is only recommended one time, as a screening. Some providers will use this same test as a disease monitoring tool if you already have osteoporosis. -Breast cancer screenings are recommended every other year for women of normal risk, age 54-69.    -Cervical cancer screenings for women over age 72 are only recommended with certain risk factors.      Here is a list of your current Health Maintenance items (your personalized list of preventive services) with a due date:  Health Maintenance Due   Topic Date Due    DTaP/Tdap/Td  (1 - Tdap) Never done    Shingles Vaccine (1 of 2) Never done

## 2023-02-27 NOTE — PROGRESS NOTES
This is the Subsequent Medicare Annual Wellness Exam, performed 12 months or more after the Initial AWV or the last Subsequent AWV    I have reviewed the patient's medical history in detail and updated the computerized patient record. Assessment/Plan   Education and counseling provided:  Are appropriate based on today's review and evaluation    1. Medicare annual wellness visit, subsequent  2. Acquired hypothyroidism  -     TSH 3RD GENERATION; Future  -     T4, FREE; Future  3. Sjogren's syndrome, with unspecified organ involvement ([facility] [address]  -     CBC WITH AUTOMATED DIFF; Future  -     METABOLIC PANEL, COMPREHENSIVE; Future  4. Gastroesophageal reflux disease without esophagitis  5. Pure hypercholesterolemia  -     LIPID PANEL; Future  6. On statin therapy  7. Anxiety  8. RLS (restless legs syndrome)  9. Vitamin D deficiency  -     VITAMIN D, 25 HYDROXY; Future  10. Urinary frequency  -     URINALYSIS W/ RFLX MICROSCOPIC; Future  11. Fatigue, unspecified type  -     CBC WITH AUTOMATED DIFF; Future  -     METABOLIC PANEL, COMPREHENSIVE; Future  12. Mixed stress and urge urinary incontinence  -     REFERRAL TO UROGYNECOLOGY  13. Pain in both lower extremities  -     REFERRAL TO VASCULAR SURGERY  14. Advanced directives, counseling/discussion  15. Screening for diabetes mellitus  16. Screening for ischemic heart disease  17. Screening for depression  -     [facility]  18.  Body mass index 30.0-30.9, adult       Depression Risk Factor Screening     3 most recent PHQ Screens 2/17/2023   Little interest or pleasure in doing things Not at all   Feeling down, depressed, irritable, or hopeless Not at all   Total Score PHQ 2 0       Alcohol & Drug Abuse Risk Screen    Do you average more than 1 drink per night or more than 7 drinks a week:  No    On any one occasion in the past three months have you have had more than 3 drinks containing alcohol:  No          Functional Ability and Level of Safety    Hearing: Hearing is good. Activities of Daily Living: The home contains: no safety equipment. Patient does total self care      Ambulation: with no difficulty     Fall Risk:  Fall Risk Assessment, last 12 mths 2/17/2023   Able to walk? Yes   Fall in past 12 months? 0   Do you feel unsteady? 0   Are you worried about falling 0   Number of falls in past 12 months -   Fall with injury? -      Abuse Screen:  Patient is not abused       Cognitive Screening    Has your family/caregiver stated any concerns about your memory: no     Cognitive Screening: Normal - Verbal Fluency Test    Health Maintenance Due     Health Maintenance Due   Topic Date Due    DTaP/Tdap/Td series (1 - Tdap) Never done    Shingles Vaccine (1 of 2) Never done       Patient Care Team   Patient Care Team:  Mariah Clemons MD as PCP - General (Internal Medicine Physician)  Mariah Clemons MD as PCP - [facility] Brian Valladares Provider    History     Patient Active Problem List   Diagnosis Code    Leukocytosis D72.829    Gait difficulty R26.9    Menopausal disorder N95.9    Irritable bowel syndrome with constipation K58.1    Urinary frequency R35.0    Sjogren's syndrome ([facility] [address] M35.00    Disorder of bone and cartilage M89.9, M94.9    Back pain with radiation M54.9    Hip pain, acute, left M25.552    Migraine G43. 909    Knee pain M25.569    Hypothyroid E03.9    Pure hypercholesterolemia E78.00    On statin therapy Z79.899    GERD (gastroesophageal reflux disease) K21.9    Anxiety F41.9    RLS (restless legs syndrome) G25.81     Past Medical History:   Diagnosis Date    Adult onset hypothyroidism 10/19/2017    Anxiety 7/27/2022    Arthritis     feet and hands    Back pain with radiation 10/19/2017    Breast cyst 6 months ago     right breast     Disorder of bone and cartilage 10/19/2017    GERD (gastroesophageal reflux disease)     High risk medication use 10/19/2017    Hip pain, acute, left 10/19/2017    hypercholesteremia     elevatd cholesterol Hyperlipidemia 10/19/2017    Hypothyroid     hypothyroid    Hypothyroid 10/19/2017    Irritable bowel syndrome with constipation 10/19/2017    Knee pain 10/19/2017    Menopausal disorder 10/19/2017    Menopause     Migraine 10/19/2017    Migraines     Neurological disorder     migraines    On statin therapy 6/18/2019    RLS (restless legs syndrome) 9/21/2022    Urinary frequency 10/19/2017      Past Surgical History:   Procedure Laterality Date    COLONOSCOPY Left 6/1/2018    COLONOSCOPY performed by Scott Taylor MD at [facility] ENDOSCOPY    [address] [facility] Left 11 years ago     negative    HX CATARACT REMOVAL Left 07/2020    HX GYN      hysterectomy    HX HYSTERECTOMY      HX OOPHORECTOMY      HX ORTHOPAEDIC  11/9/11    SPINE LUMBAR LAMINECTOMY - L4-5 LAMINECTOMY FOR REMOVAL OF EXTRA DURAL LESION ON THE LEFT - benign    HX OTHER SURGICAL      ganglion cyst removed finger    HX TONSILLECTOMY      [place] UNLISTED PROCEDURE ABDOMEN PERITONEUM & OMENTUM  2011    removed \"sludge\" from duct in gallbladder     Current Outpatient Medications   Medication Sig Dispense Refill    ubidecarenone (ULTRA COQ10 PO) Take  by mouth.      escitalopram oxalate (LEXAPRO) 20 mg tablet Take 1 Tablet by mouth daily. Indications: repeated episodes of anxiety 90 Tablet 0    levothyroxine (SYNTHROID) 100 mcg tablet Take 1 Tablet by mouth Daily (before breakfast). 90 Tablet 3    pramipexole (MIRAPEX) 0.5 mg tablet TAKE 1 TABLET BY MOUTH NIGHTLY FOR RESTLESS LEGS SYNDROME, AN EXTREME DISCOMFORT IN THE CALF MUSCLES WHEN SITTING OR LYING DOWN 90 Tablet 3    omeprazole (PRILOSEC OTC) 20 mg tablet Take 20 mg by mouth daily. ergocalciferol (ERGOCALCIFEROL) 1,250 mcg (50,000 unit) capsule TAKE 1 CAPSULE BY MOUTH EVERY SEVEN (7) DAYS. (Patient not taking: Reported on 2/17/2023) 12 Capsule 0    melatonin (MELATIN PO) Take  by mouth nightly as needed.  (Patient not taking: Reported on 2/17/2023)       Allergies   Allergen Reactions    Latex Rash Bandages causes a rash if left on for more than a couple of days. She buys latex free band-aids. Codeine Nausea and Vomiting       Family History   Problem Relation Age of Onset    Heart Disease Mother         \"shrinking heart valve\"     Social History     Tobacco Use    Smoking status: Never    Smokeless tobacco: Never   Substance Use Topics    Alcohol use: Yes     Comment: rarely         C Moent Buchanan MD           Lindsey Campa is a 68 y.o. female and presents with Follow Up Chronic Condition (6 mo fu) and Annual Wellness Visit  . Subjective:  Mrs. Griffin Sinha presents today for Medicare wellness review as well as follow-up of several problems including hypothyroidism, GERD, restless leg syndrome, generalized anxiety, hyperlipidemia, and monitoring of statin therapy. She is doing well on her current medical regimen. She denies any side effects from her medications currently. She has no shortness of breath, chest pain, palpitations, PND, orthopnea, or pedal edema.     Past Medical History:   Diagnosis Date    Adult onset hypothyroidism 10/19/2017    Anxiety 7/27/2022    Arthritis     feet and hands    Back pain with radiation 10/19/2017    Breast cyst 6 months ago     right breast     Disorder of bone and cartilage 10/19/2017    GERD (gastroesophageal reflux disease)     High risk medication use 10/19/2017    Hip pain, acute, left 10/19/2017    hypercholesteremia     elevatd cholesterol    Hyperlipidemia 10/19/2017    Hypothyroid     hypothyroid    Hypothyroid 10/19/2017    Irritable bowel syndrome with constipation 10/19/2017    Knee pain 10/19/2017    Menopausal disorder 10/19/2017    Menopause     Migraine 10/19/2017    Migraines     Neurological disorder     migraines    On statin therapy 6/18/2019    RLS (restless legs syndrome) 9/21/2022    Urinary frequency 10/19/2017     Past Surgical History:   Procedure Laterality Date    COLONOSCOPY Left 6/1/2018    COLONOSCOPY performed by Adelita Banda MD at [facility] ENDOSCOPY    HX BREAST BIOPSY Left 11 years ago     negative    HX CATARACT REMOVAL Left 07/2020    HX GYN      hysterectomy    HX HYSTERECTOMY      HX OOPHORECTOMY      HX ORTHOPAEDIC  11/9/11    SPINE LUMBAR LAMINECTOMY - L4-5 LAMINECTOMY FOR REMOVAL OF EXTRA DURAL LESION ON THE LEFT - benign    HX OTHER SURGICAL      ganglion cyst removed finger    HX TONSILLECTOMY      [place] UNLISTED PROCEDURE ABDOMEN PERITONEUM & OMENTUM  2011    removed \"sludge\" from duct in gallbladder     Allergies   Allergen Reactions    Latex Rash     Bandages causes a rash if left on for more than a couple of days. She buys latex free band-aids. Codeine Nausea and Vomiting     Current Outpatient Medications   Medication Sig Dispense Refill    ubidecarenone (ULTRA COQ10 PO) Take  by mouth.      escitalopram oxalate (LEXAPRO) 20 mg tablet Take 1 Tablet by mouth daily. Indications: repeated episodes of anxiety 90 Tablet 0    levothyroxine (SYNTHROID) 100 mcg tablet Take 1 Tablet by mouth Daily (before breakfast). 90 Tablet 3    pramipexole (MIRAPEX) 0.5 mg tablet TAKE 1 TABLET BY MOUTH NIGHTLY FOR RESTLESS LEGS SYNDROME, AN EXTREME DISCOMFORT IN THE CALF MUSCLES WHEN SITTING OR LYING DOWN 90 Tablet 3    omeprazole (PRILOSEC OTC) 20 mg tablet Take 20 mg by mouth daily. ergocalciferol (ERGOCALCIFEROL) 1,250 mcg (50,000 unit) capsule TAKE 1 CAPSULE BY MOUTH EVERY SEVEN (7) DAYS. (Patient not taking: Reported on 2/17/2023) 12 Capsule 0    melatonin (MELATIN PO) Take  by mouth nightly as needed.  (Patient not taking: Reported on 2/17/2023)       Social History     Socioeconomic History    Marital status:    Tobacco Use    Smoking status: Never    Smokeless tobacco: Never   Vaping Use    Vaping Use: Never used   Substance and Sexual Activity    Alcohol use: Yes     Comment: rarely    Drug use: No    Sexual activity: Yes     Partners: Male     Birth control/protection: Surgical     Family History   Problem Relation Age of Onset Heart Disease Mother         \"shrinking heart valve\"       Review of Systems  Constitutional:  negative for fevers, chills, anorexia and weight loss  Eyes:    negative for visual disturbance and irritation  ENT:    negative for tinnitus,sore throat,nasal congestion,ear pains. hoarseness  Respiratory:     negative for cough, hemoptysis, dyspnea,wheezing  CV:    negative for chest pain, palpitations, lower extremity edema  GI:    negative for nausea, vomiting, diarrhea, abdominal pain,melena  Endo:               negative for polyuria,polydipsia,polyphagia,heat intolerance  Genitourinary : negative for frequency, dysuria and hematuria  Integumentary: negative for rash and pruritus  Hematologic:   negative for easy bruising and gum/nose bleeding  Musculoskel:  negative for myalgias, arthralgias, back pain, muscle weakness, joint pain  Neurological:   negative for headaches, dizziness, vertigo, memory problems and gait   Behavl/Psych:  negative for feelings of anxiety, depression, mood changes  ROS otherwise negative      Objective:  Visit Vitals  /76 (BP 1 Location: Left upper arm, BP Patient Position: Sitting, BP Cuff Size: Adult)   Pulse 75   Temp 98.1 °F (36.7 °C) (Oral)   Resp 16   Ht 5' 2\" (1.575 m)   Wt 168 lb 12.8 oz (76.6 kg)   LMP  (LMP Unknown)   SpO2 98%   BMI 30.87 kg/m²     Physical Exam:   General appearance - alert, well appearing, and in no distress  Mental status - alert, oriented to person, place, and time  EYE-NIEVES, EOMI, fundi normal, corneas normal, no foreign bodies  ENT-ENT exam normal, no neck nodes or sinus tenderness  Nose - normal and patent, no erythema, discharge or polyps  Mouth - mucous membranes moist, pharynx normal without lesions  Neck - supple, no significant adenopathy   Chest - clear to auscultation, no wheezes, rales or rhonchi, symmetric air entry   Heart - normal rate, regular rhythm, normal S1, S2, no murmurs, rubs, clicks or gallops   Abdomen - soft, nontender, nondistended, no masses or organomegaly  Lymph- no adenopathy palpable  Ext-peripheral pulses normal, no pedal edema, no clubbing or cyanosis  Skin-Warm and dry. no hyperpigmentation, vitiligo, or suspicious lesions  Neuro -alert, oriented, normal speech, no focal findings or movement disorder noted      Assessment/Plan:  Diagnoses and all orders for this visit:    1. Medicare annual wellness visit, subsequent    2. Acquired hypothyroidism  -     TSH 3RD GENERATION; Future  -     T4, FREE; Future    3. Sjogren's syndrome, with unspecified organ involvement ([facility]  -     CBC WITH AUTOMATED DIFF; Future  -     METABOLIC PANEL, COMPREHENSIVE; Future    4. Gastroesophageal reflux disease without esophagitis    5. Pure hypercholesterolemia  -     LIPID PANEL; Future    6. On statin therapy    7. Anxiety    8. RLS (restless legs syndrome)    9. Vitamin D deficiency  -     VITAMIN D, 25 HYDROXY; Future    10. Urinary frequency  -     URINALYSIS W/ RFLX MICROSCOPIC; Future    11. Fatigue, unspecified type  -     CBC WITH AUTOMATED DIFF; Future  -     METABOLIC PANEL, COMPREHENSIVE; Future    12. Mixed stress and urge urinary incontinence  -     REFERRAL TO UROGYNECOLOGY    13. Pain in both lower extremities  -     REFERRAL TO VASCULAR SURGERY    14. Advanced directives, counseling/discussion    15. Screening for diabetes mellitus    16. Screening for ischemic heart disease    17. Screening for depression  -     [facility] [address]    18. Body mass index 30.0-30.9, adult          ICD-10-CM ICD-9-CM    1. Medicare annual wellness visit, subsequent  Z00.00 V70.0       2. Acquired hypothyroidism  E03.9 244.9 TSH 3RD GENERATION      T4, FREE      T4, FREE      TSH 3RD GENERATION      3. Sjogren's syndrome, with unspecified organ involvement ([facility]  M35.00 710.2 CBC WITH AUTOMATED DIFF      METABOLIC PANEL, COMPREHENSIVE      METABOLIC PANEL, COMPREHENSIVE      CBC WITH AUTOMATED DIFF      4.  Gastroesophageal reflux disease without esophagitis  K21.9 530.81       5. Pure hypercholesterolemia  E78.00 272.0 LIPID PANEL      LIPID PANEL      6. On statin therapy  Z79.899 V58.69       7. Anxiety  F41.9 300.00       8. RLS (restless legs syndrome)  G25.81 333.94       9. Vitamin D deficiency  E55.9 268.9 VITAMIN D, 25 HYDROXY      VITAMIN D, 25 HYDROXY      10. Urinary frequency  R35.0 788.41 URINALYSIS W/ RFLX MICROSCOPIC      URINALYSIS W/ RFLX MICROSCOPIC      11. Fatigue, unspecified type  R53.83 780.79 CBC WITH AUTOMATED DIFF      METABOLIC PANEL, COMPREHENSIVE      METABOLIC PANEL, COMPREHENSIVE      CBC WITH AUTOMATED DIFF      12. Mixed stress and urge urinary incontinence  N39.46 788.33 REFERRAL TO UROGYNECOLOGY      13. Pain in both lower extremities  M79.604 729.5 REFERRAL TO VASCULAR SURGERY    M79.605        14. Advanced directives, counseling/discussion  Z71.89 V65.49       15. Screening for diabetes mellitus  Z13.1 V77.1       16. Screening for ischemic heart disease  Z13.6 V81.0       17. Screening for depression  Z13.31 V79.0 [facility]      18. Body mass index 30.0-30.9, adult  Z68.30 V85.30       Plan:    Continue current medical regimen as outlined above. Further recommendations based on labs as ordered. Follow-up and Dispositions    Return in about 6 months (around 8/17/2023) for follow up. I have reviewed with the patient details of the assessment and plan and all questions were answered. Relevent patient education was performed. Verbal and/or written instructions (see AVS) provided. The most recent lab findings were reviewed with the patient. Plan was discussed with patient who verbally expressed understanding. An After Visit Summary was printed and given to the patient.     Saskia Barry MD

## 2023-03-13 RX ORDER — ESCITALOPRAM OXALATE 20 MG/1
20 TABLET ORAL DAILY
Qty: 90 TABLET | Refills: 1 | Status: SHIPPED | OUTPATIENT
Start: 2023-03-13

## 2023-03-13 NOTE — TELEPHONE ENCOUNTER
Last Refill: 12/20/22  Last Visit: 2/17/2023   Next Visit: 8/22/2023    Requested Prescriptions     Pending Prescriptions Disp Refills    escitalopram oxalate (LEXAPRO) 20 mg tablet 90 Tablet 0     Sig: Take 1 Tablet by mouth daily.  Indications: repeated episodes of anxiety

## 2023-08-22 ENCOUNTER — OFFICE VISIT (OUTPATIENT)
Facility: CLINIC | Age: 78
End: 2023-08-22

## 2023-08-22 VITALS
BODY MASS INDEX: 29.63 KG/M2 | SYSTOLIC BLOOD PRESSURE: 122 MMHG | TEMPERATURE: 98.2 F | RESPIRATION RATE: 18 BRPM | OXYGEN SATURATION: 99 % | HEART RATE: 76 BPM | DIASTOLIC BLOOD PRESSURE: 80 MMHG | WEIGHT: 161 LBS | HEIGHT: 62 IN

## 2023-08-22 DIAGNOSIS — E78.00 PURE HYPERCHOLESTEROLEMIA: ICD-10-CM

## 2023-08-22 DIAGNOSIS — F41.9 ANXIETY: ICD-10-CM

## 2023-08-22 DIAGNOSIS — E03.4 HYPOTHYROIDISM DUE TO ACQUIRED ATROPHY OF THYROID: Primary | ICD-10-CM

## 2023-08-22 DIAGNOSIS — G25.81 RLS (RESTLESS LEGS SYNDROME): ICD-10-CM

## 2023-08-22 DIAGNOSIS — E55.9 VITAMIN D DEFICIENCY: ICD-10-CM

## 2023-08-22 DIAGNOSIS — K21.9 GASTROESOPHAGEAL REFLUX DISEASE WITHOUT ESOPHAGITIS: ICD-10-CM

## 2023-08-22 DIAGNOSIS — K58.1 IRRITABLE BOWEL SYNDROME WITH CONSTIPATION: ICD-10-CM

## 2023-08-22 DIAGNOSIS — M35.01 SJOGREN'S SYNDROME WITH KERATOCONJUNCTIVITIS SICCA (HCC): ICD-10-CM

## 2023-08-22 RX ORDER — TROSPIUM CHLORIDE ER 60 MG/1
CAPSULE ORAL
COMMUNITY
Start: 2023-05-25

## 2023-08-22 RX ORDER — SULFAMETHOXAZOLE AND TRIMETHOPRIM 800; 160 MG/1; MG/1
1 TABLET ORAL 2 TIMES DAILY
COMMUNITY
Start: 2023-08-17 | End: 2023-08-27

## 2023-08-22 NOTE — PROGRESS NOTES
Lurdes Garcia is a 66 y.o. female presenting for 6 Month Follow-Up and Anxiety  . 1. Have you been to the ER, urgent care clinic since your last visit? Hospitalized since your last visit? No    2. Have you seen or consulted any other health care providers outside of the [address] [place] [address] Avenue since your last visit? Include any pap smears or colon screening.   Urologist, Vascular

## 2023-08-22 NOTE — PROGRESS NOTES
Megan Robins is a 66 y.o. female and presents with 6 Month Follow-Up and Anxiety  . Subjective:  Mrs. Andrea Alcantara presents today for follow-up of several problems including hypothyroidism, hyperlipidemia, GERD, irritable bowel syndrome with constipation, history of Sjogren's syndrome, restless leg syndrome and anxiety. She has been doing well overall. She does feel like her anxiety increases when she goes out to places such as the grocery store the doctor's office. This does not keep her from going to these places. She remains active in the [employer]. She has no shortness of breath, chest pain, palpitations, PND, orthopnea, or pedal edema. She has side effects on statin therapy and is now off of a statin. Her last LDL cholesterol was mildly elevated however her HDL cholesterol and triglyceride are excellent.     Past Medical History:   Diagnosis Date    Adult onset hypothyroidism 10/19/2017    Anxiety 7/27/2022    Arthritis     feet and hands    Back pain with radiation 10/19/2017    Breast cyst 6 months ago     right breast     Disorder of bone and cartilage 10/19/2017    GERD (gastroesophageal reflux disease)     High risk medication use 10/19/2017    Hip pain, acute, left 10/19/2017    Hyperlipidemia 10/19/2017    Hypothyroid 10/19/2017    Irritable bowel syndrome with constipation 10/19/2017    Knee pain 10/19/2017    Menopausal disorder 10/19/2017    Menopause     Migraine 10/19/2017    Neurological disorder     migraines    On statin therapy 6/18/2019    Other ill-defined conditions(799.89)     Other ill-defined conditions(799.89)     hypothyroid    Other ill-defined conditions(799.89)     elevatd cholesterol    RLS (restless legs syndrome) 9/21/2022    Urinary frequency 10/19/2017     Past Surgical History:   Procedure Laterality Date    BREAST BIOPSY Left 11 years ago     negative    CATARACT REMOVAL Left 07/2020    COLONOSCOPY Left 6/1/2018    COLONOSCOPY performed by Raza Uribe MD at [facility]

## 2023-08-23 LAB
25(OH)D3 SERPL-MCNC: 11.5 NG/ML (ref 30–100)
ALBUMIN SERPL-MCNC: 4 G/DL (ref 3.5–5)
ALBUMIN/GLOB SERPL: 1.3 (ref 1.1–2.2)
ALP SERPL-CCNC: 82 U/L (ref 45–117)
ALT SERPL-CCNC: 19 U/L (ref 12–78)
ANION GAP SERPL CALC-SCNC: 6 MMOL/L (ref 5–15)
AST SERPL-CCNC: 18 U/L (ref 15–37)
BILIRUB SERPL-MCNC: 0.4 MG/DL (ref 0.2–1)
BUN SERPL-MCNC: 12 MG/DL (ref 6–20)
BUN/CREAT SERPL: 10 (ref 12–20)
CALCIUM SERPL-MCNC: 9.4 MG/DL (ref 8.5–10.1)
CHLORIDE SERPL-SCNC: 104 MMOL/L (ref 97–108)
CHOLEST SERPL-MCNC: 278 MG/DL
CO2 SERPL-SCNC: 26 MMOL/L (ref 21–32)
COMMENT:: NORMAL
CREAT SERPL-MCNC: 1.15 MG/DL (ref 0.55–1.02)
GLOBULIN SER CALC-MCNC: 3.1 G/DL (ref 2–4)
GLUCOSE SERPL-MCNC: 94 MG/DL (ref 65–100)
HDLC SERPL-MCNC: 70 MG/DL
HDLC SERPL: 4 (ref 0–5)
LDLC SERPL CALC-MCNC: 187.2 MG/DL (ref 0–100)
POTASSIUM SERPL-SCNC: 4.1 MMOL/L (ref 3.5–5.1)
PROT SERPL-MCNC: 7.1 G/DL (ref 6.4–8.2)
SODIUM SERPL-SCNC: 136 MMOL/L (ref 136–145)
SPECIMEN HOLD: NORMAL
T4 FREE SERPL-MCNC: 1.1 NG/DL (ref 0.8–1.5)
TRIGL SERPL-MCNC: 104 MG/DL
TSH SERPL DL<=0.05 MIU/L-ACNC: 15.4 UIU/ML (ref 0.36–3.74)
VLDLC SERPL CALC-MCNC: 20.8 MG/DL

## 2023-08-23 RX ORDER — ERGOCALCIFEROL 1.25 MG/1
50000 CAPSULE ORAL
Qty: 12 CAPSULE | Refills: 0 | Status: SHIPPED | OUTPATIENT
Start: 2023-08-23

## 2023-08-27 ENCOUNTER — HOSPITAL ENCOUNTER (INPATIENT)
Facility: HOSPITAL | Age: 78
LOS: 1 days | Discharge: HOME | DRG: 056 | End: 2023-08-28
Attending: EMERGENCY MEDICINE | Admitting: INTERNAL MEDICINE
Payer: MEDICARE

## 2023-08-27 ENCOUNTER — APPOINTMENT (OUTPATIENT)
Facility: HOSPITAL | Age: 78
DRG: 056 | End: 2023-08-27
Payer: MEDICARE

## 2023-08-27 DIAGNOSIS — I65.23 BILATERAL CAROTID ARTERY STENOSIS: ICD-10-CM

## 2023-08-27 DIAGNOSIS — R41.0 ACUTE DELIRIUM: Primary | ICD-10-CM

## 2023-08-27 DIAGNOSIS — I67.89 CEREBRAL MICROVASCULAR DISEASE: ICD-10-CM

## 2023-08-27 PROBLEM — R41.82 ALTERED MENTAL STATE: Status: ACTIVE | Noted: 2023-08-27

## 2023-08-27 LAB
ALBUMIN SERPL-MCNC: 3.3 G/DL (ref 3.5–5)
ALBUMIN/GLOB SERPL: 0.9 (ref 1.1–2.2)
ALP SERPL-CCNC: 93 U/L (ref 45–117)
ALT SERPL-CCNC: 20 U/L (ref 12–78)
ANION GAP SERPL CALC-SCNC: 4 MMOL/L (ref 5–15)
APPEARANCE UR: CLEAR
AST SERPL-CCNC: 14 U/L (ref 15–37)
BACTERIA URNS QL MICRO: NEGATIVE /HPF
BASOPHILS # BLD: 0.1 K/UL (ref 0–0.1)
BASOPHILS NFR BLD: 1 % (ref 0–1)
BILIRUB SERPL-MCNC: 0.3 MG/DL (ref 0.2–1)
BILIRUB UR QL: NEGATIVE
BUN SERPL-MCNC: 24 MG/DL (ref 6–20)
BUN/CREAT SERPL: 26 (ref 12–20)
CALCIUM SERPL-MCNC: 8.5 MG/DL (ref 8.5–10.1)
CHLORIDE SERPL-SCNC: 109 MMOL/L (ref 97–108)
CO2 SERPL-SCNC: 26 MMOL/L (ref 21–32)
COLOR UR: ABNORMAL
CREAT SERPL-MCNC: 0.91 MG/DL (ref 0.55–1.02)
DIFFERENTIAL METHOD BLD: ABNORMAL
EOSINOPHIL # BLD: 0.3 K/UL (ref 0–0.4)
EOSINOPHIL NFR BLD: 2 % (ref 0–7)
EPITH CASTS URNS QL MICRO: ABNORMAL /LPF
ERYTHROCYTE [DISTWIDTH] IN BLOOD BY AUTOMATED COUNT: 13.2 % (ref 11.5–14.5)
GLOBULIN SER CALC-MCNC: 3.7 G/DL (ref 2–4)
GLUCOSE SERPL-MCNC: 105 MG/DL (ref 65–100)
GLUCOSE UR STRIP.AUTO-MCNC: NEGATIVE MG/DL
HCT VFR BLD AUTO: 38.9 % (ref 35–47)
HGB BLD-MCNC: 12.7 G/DL (ref 11.5–16)
HGB UR QL STRIP: ABNORMAL
HYALINE CASTS URNS QL MICRO: ABNORMAL /LPF (ref 0–2)
IMM GRANULOCYTES # BLD AUTO: 0.1 K/UL (ref 0–0.04)
IMM GRANULOCYTES NFR BLD AUTO: 0 % (ref 0–0.5)
KETONES UR QL STRIP.AUTO: NEGATIVE MG/DL
LEUKOCYTE ESTERASE UR QL STRIP.AUTO: ABNORMAL
LYMPHOCYTES # BLD: 2.7 K/UL (ref 0.8–3.5)
LYMPHOCYTES NFR BLD: 21 % (ref 12–49)
MAGNESIUM SERPL-MCNC: 1.9 MG/DL (ref 1.6–2.4)
MCH RBC QN AUTO: 30 PG (ref 26–34)
MCHC RBC AUTO-ENTMCNC: 32.6 G/DL (ref 30–36.5)
MCV RBC AUTO: 92 FL (ref 80–99)
MONOCYTES # BLD: 0.9 K/UL (ref 0–1)
MONOCYTES NFR BLD: 7 % (ref 5–13)
NEUTS SEG # BLD: 8.9 K/UL (ref 1.8–8)
NEUTS SEG NFR BLD: 69 % (ref 32–75)
NITRITE UR QL STRIP.AUTO: NEGATIVE
NRBC # BLD: 0 K/UL (ref 0–0.01)
NRBC BLD-RTO: 0 PER 100 WBC
PH UR STRIP: 5.5 (ref 5–8)
PLATELET # BLD AUTO: 251 K/UL (ref 150–400)
PMV BLD AUTO: 9.5 FL (ref 8.9–12.9)
POTASSIUM SERPL-SCNC: 3.6 MMOL/L (ref 3.5–5.1)
PROT SERPL-MCNC: 7 G/DL (ref 6.4–8.2)
PROT UR STRIP-MCNC: NEGATIVE MG/DL
RBC # BLD AUTO: 4.23 M/UL (ref 3.8–5.2)
RBC #/AREA URNS HPF: ABNORMAL /HPF (ref 0–5)
SODIUM SERPL-SCNC: 139 MMOL/L (ref 136–145)
SP GR UR REFRACTOMETRY: 1.03
TSH SERPL DL<=0.05 MIU/L-ACNC: 9.76 UIU/ML (ref 0.36–3.74)
URINE CULTURE IF INDICATED: ABNORMAL
UROBILINOGEN UR QL STRIP.AUTO: 1 EU/DL (ref 0.2–1)
WBC # BLD AUTO: 12.9 K/UL (ref 3.6–11)
WBC URNS QL MICRO: ABNORMAL /HPF (ref 0–4)

## 2023-08-27 PROCEDURE — 93005 ELECTROCARDIOGRAM TRACING: CPT | Performed by: EMERGENCY MEDICINE

## 2023-08-27 PROCEDURE — 70551 MRI BRAIN STEM W/O DYE: CPT

## 2023-08-27 PROCEDURE — 99285 EMERGENCY DEPT VISIT HI MDM: CPT

## 2023-08-27 PROCEDURE — 6360000002 HC RX W HCPCS: Performed by: INTERNAL MEDICINE

## 2023-08-27 PROCEDURE — 70450 CT HEAD/BRAIN W/O DYE: CPT

## 2023-08-27 PROCEDURE — 97116 GAIT TRAINING THERAPY: CPT

## 2023-08-27 PROCEDURE — 6360000004 HC RX CONTRAST MEDICATION: Performed by: STUDENT IN AN ORGANIZED HEALTH CARE EDUCATION/TRAINING PROGRAM

## 2023-08-27 PROCEDURE — 2580000003 HC RX 258: Performed by: INTERNAL MEDICINE

## 2023-08-27 PROCEDURE — 36415 COLL VENOUS BLD VENIPUNCTURE: CPT

## 2023-08-27 PROCEDURE — 97530 THERAPEUTIC ACTIVITIES: CPT

## 2023-08-27 PROCEDURE — 71045 X-RAY EXAM CHEST 1 VIEW: CPT

## 2023-08-27 PROCEDURE — 97161 PT EVAL LOW COMPLEX 20 MIN: CPT

## 2023-08-27 PROCEDURE — 70498 CT ANGIOGRAPHY NECK: CPT

## 2023-08-27 PROCEDURE — 81001 URINALYSIS AUTO W/SCOPE: CPT

## 2023-08-27 PROCEDURE — 85025 COMPLETE CBC W/AUTO DIFF WBC: CPT

## 2023-08-27 PROCEDURE — 83735 ASSAY OF MAGNESIUM: CPT

## 2023-08-27 PROCEDURE — 1100000003 HC PRIVATE W/ TELEMETRY

## 2023-08-27 PROCEDURE — 84443 ASSAY THYROID STIM HORMONE: CPT

## 2023-08-27 PROCEDURE — 80053 COMPREHEN METABOLIC PANEL: CPT

## 2023-08-27 RX ORDER — SODIUM CHLORIDE 9 MG/ML
INJECTION, SOLUTION INTRAVENOUS PRN
Status: DISCONTINUED | OUTPATIENT
Start: 2023-08-27 | End: 2023-08-28 | Stop reason: HOSPADM

## 2023-08-27 RX ORDER — SODIUM CHLORIDE 0.9 % (FLUSH) 0.9 %
5-40 SYRINGE (ML) INJECTION PRN
Status: DISCONTINUED | OUTPATIENT
Start: 2023-08-27 | End: 2023-08-28 | Stop reason: HOSPADM

## 2023-08-27 RX ORDER — POLYETHYLENE GLYCOL 3350 17 G/17G
17 POWDER, FOR SOLUTION ORAL DAILY PRN
Status: DISCONTINUED | OUTPATIENT
Start: 2023-08-27 | End: 2023-08-28 | Stop reason: HOSPADM

## 2023-08-27 RX ORDER — ACETAMINOPHEN 325 MG/1
650 TABLET ORAL EVERY 6 HOURS PRN
Status: DISCONTINUED | OUTPATIENT
Start: 2023-08-27 | End: 2023-08-28 | Stop reason: HOSPADM

## 2023-08-27 RX ORDER — ASPIRIN 300 MG/1
300 SUPPOSITORY RECTAL DAILY
Status: DISCONTINUED | OUTPATIENT
Start: 2023-08-28 | End: 2023-08-28 | Stop reason: HOSPADM

## 2023-08-27 RX ORDER — ASPIRIN 81 MG/1
81 TABLET, CHEWABLE ORAL DAILY
Status: DISCONTINUED | OUTPATIENT
Start: 2023-08-28 | End: 2023-08-28 | Stop reason: HOSPADM

## 2023-08-27 RX ORDER — SODIUM CHLORIDE 0.9 % (FLUSH) 0.9 %
5-40 SYRINGE (ML) INJECTION EVERY 12 HOURS SCHEDULED
Status: DISCONTINUED | OUTPATIENT
Start: 2023-08-27 | End: 2023-08-28 | Stop reason: HOSPADM

## 2023-08-27 RX ORDER — ONDANSETRON 4 MG/1
4 TABLET, ORALLY DISINTEGRATING ORAL EVERY 8 HOURS PRN
Status: DISCONTINUED | OUTPATIENT
Start: 2023-08-27 | End: 2023-08-28 | Stop reason: HOSPADM

## 2023-08-27 RX ORDER — ENOXAPARIN SODIUM 100 MG/ML
40 INJECTION SUBCUTANEOUS EVERY 24 HOURS
Status: DISCONTINUED | OUTPATIENT
Start: 2023-08-27 | End: 2023-08-28 | Stop reason: HOSPADM

## 2023-08-27 RX ORDER — ONDANSETRON 2 MG/ML
4 INJECTION INTRAMUSCULAR; INTRAVENOUS EVERY 6 HOURS PRN
Status: DISCONTINUED | OUTPATIENT
Start: 2023-08-27 | End: 2023-08-28 | Stop reason: HOSPADM

## 2023-08-27 RX ADMIN — IOPAMIDOL 100 ML: 755 INJECTION, SOLUTION INTRAVENOUS at 18:48

## 2023-08-27 RX ADMIN — SODIUM CHLORIDE, PRESERVATIVE FREE 10 ML: 5 INJECTION INTRAVENOUS at 23:20

## 2023-08-27 RX ADMIN — ENOXAPARIN SODIUM 40 MG: 100 INJECTION SUBCUTANEOUS at 20:30

## 2023-08-27 ASSESSMENT — LIFESTYLE VARIABLES
HOW OFTEN DO YOU HAVE A DRINK CONTAINING ALCOHOL: MONTHLY OR LESS
HOW MANY STANDARD DRINKS CONTAINING ALCOHOL DO YOU HAVE ON A TYPICAL DAY: 1 OR 2

## 2023-08-27 ASSESSMENT — PAIN SCALES - GENERAL: PAINLEVEL_OUTOF10: 2

## 2023-08-27 NOTE — ED PROVIDER NOTES
[facility] EMERGENCY DEPT  EMERGENCY DEPARTMENT ENCOUNTER       Pt Name: Niurka Zarate  MRN: [id number]  [address] 1945  Date of evaluation: 8/27/2023  Provider: Belinda Gandhi DO   PCP: Chandana Jiménez MD  Note Started: 7:43 PM EDT 8/27/23     CHIEF COMPLAINT       Chief Complaint   Patient presents with    Memory Loss     [employer] member and family have noticed pt with periods of confusion for a while, but specifically Wednesday and today. She was confused about a game she playing with her friends. Today she left her door open today. Pt is currently on an antibiotic for a uti (7 days) prescribed by [facility]. Pt has had no fall, and is not on a blood thinner. HISTORY OF PRESENT ILLNESS: 1 or more elements      History From: patient's daughter, History limited by: Gopi Gaona is a 66 y.o. female presents to the emergency room by private vehicle for confusion and altered mental status episodes. Patient presents with her daughter who is providing some of the information as patient is not necessarily aware of these certain events. This past Wednesday was one of the first episodes that I tried to call the family out of concern because the patient had been complaining again of nausea along with some ordinarily would not have any problems or issues however during the game there were episodes where she was unclear on how to play the ER what to do. Daughter states today the patient gone to [employer] she did not take her phone with her. She states that because they were unable to get a hold of her they went to the family home to find the door wide open which is not something she would ordinarily do. Daughter states is also been 1 other episode recently where she gone to the drive-through and then left without taking any of her food with her. Daughter states is also been several instances where the patient has been found staring off and not talking during these episodes.   There is been no seizure-like

## 2023-08-27 NOTE — H&P
Hospitalist Admission Note    NAME: Fabiana Simpson   :  1945   MRN:  [id number]     Date/Time:  2023 7:47 PM    Patient PCP: Aditi Thomas MD  ______________________________________________________________________  Given the patient's current clinical presentation, I have a high level of concern for decompensation if discharged from the emergency department. Given the patient's current clinical presentation, I have a high level of concern for decompensation if patient is discharged from the emergency department. Patient will be admitted as an inpatient with an estimated LOS of 2 days    My assessment of this patient's clinical condition and my plan of care is as follows. Assessment / Plan: Altered mental status  -Patient presents with progressive confusion and forgetfulness, worse over the last 3 weeks. Was checked for and treated for UTI but with no improvement. Differentials include mild cognitive impairment or early dementia, seizures or TIA. -CT of head: No acute intracranial abnormality  -CTA head and neck:No evidence for acute large vessel arterial occlusion. Mild atherosclerosis. Questionable fibromuscular dysplasia. -Urinalysis 5-10 WBC, negative bacteria-culture not indicated  -Check TSH, ammonia, vitamin B12  -Echocardiogram  -Neurology consult  -PT OT speech therapy eval and treat    Hypothyroidism  Hyperlipidemia  IBS  GERD  Restless leg syndrome  History of Sjogren's syndrome  History of anxiety  Overactive bladder  -Continue Synthroid  -Continue Mirapex  -Continue Lexapro  -We will hold Sanctura for now as it has known anticholinergic side effects          Medical Decision Making:  Labs reviewed by myself: CBC, BMP  Diagnostic data reviewed by myself: CT of head and CTA of head and neck  Toxic drug monitoring:  Discussed case with emergency room physician . After discussion I am in agreement that acuity of patient's medical condition necessitates hospital stay.         Code

## 2023-08-28 ENCOUNTER — APPOINTMENT (OUTPATIENT)
Facility: HOSPITAL | Age: 78
DRG: 056 | End: 2023-08-28
Attending: INTERNAL MEDICINE
Payer: MEDICARE

## 2023-08-28 ENCOUNTER — APPOINTMENT (OUTPATIENT)
Facility: HOSPITAL | Age: 78
DRG: 056 | End: 2023-08-28
Attending: PSYCHIATRY & NEUROLOGY
Payer: MEDICARE

## 2023-08-28 VITALS
HEART RATE: 68 BPM | SYSTOLIC BLOOD PRESSURE: 184 MMHG | HEIGHT: 62 IN | RESPIRATION RATE: 18 BRPM | BODY MASS INDEX: 29.44 KG/M2 | TEMPERATURE: 97.7 F | DIASTOLIC BLOOD PRESSURE: 81 MMHG | OXYGEN SATURATION: 93 % | WEIGHT: 160 LBS

## 2023-08-28 PROBLEM — F02.80 LATE ONSET ALZHEIMER'S DISEASE WITHOUT BEHAVIORAL DISTURBANCE (HCC): Status: ACTIVE | Noted: 2023-08-28

## 2023-08-28 PROBLEM — R41.82 ACUTE ALTERATION IN MENTAL STATUS: Status: ACTIVE | Noted: 2023-08-28

## 2023-08-28 PROBLEM — G30.1 LATE ONSET ALZHEIMER'S DISEASE WITHOUT BEHAVIORAL DISTURBANCE (HCC): Status: ACTIVE | Noted: 2023-08-28

## 2023-08-28 PROBLEM — I67.89 CEREBRAL MICROVASCULAR DISEASE: Status: ACTIVE | Noted: 2023-08-28

## 2023-08-28 PROBLEM — I65.23 BILATERAL CAROTID ARTERY STENOSIS: Status: ACTIVE | Noted: 2023-08-28

## 2023-08-28 LAB
AMMONIA PLAS-SCNC: 20 UMOL/L
ANION GAP SERPL CALC-SCNC: 4 MMOL/L (ref 5–15)
BUN SERPL-MCNC: 15 MG/DL (ref 6–20)
BUN/CREAT SERPL: 21 (ref 12–20)
CALCIUM SERPL-MCNC: 8.7 MG/DL (ref 8.5–10.1)
CHLORIDE SERPL-SCNC: 110 MMOL/L (ref 97–108)
CHOLEST SERPL-MCNC: 238 MG/DL
CO2 SERPL-SCNC: 25 MMOL/L (ref 21–32)
CREAT SERPL-MCNC: 0.72 MG/DL (ref 0.55–1.02)
EKG ATRIAL RATE: 78 BPM
EKG DIAGNOSIS: NORMAL
EKG P AXIS: 18 DEGREES
EKG P-R INTERVAL: 152 MS
EKG Q-T INTERVAL: 384 MS
EKG QRS DURATION: 80 MS
EKG QTC CALCULATION (BAZETT): 437 MS
EKG R AXIS: -6 DEGREES
EKG T AXIS: 0 DEGREES
EKG VENTRICULAR RATE: 78 BPM
ERYTHROCYTE [DISTWIDTH] IN BLOOD BY AUTOMATED COUNT: 13.2 % (ref 11.5–14.5)
EST. AVERAGE GLUCOSE BLD GHB EST-MCNC: 105 MG/DL
GLUCOSE BLD STRIP.AUTO-MCNC: 101 MG/DL (ref 65–117)
GLUCOSE BLD STRIP.AUTO-MCNC: 124 MG/DL (ref 65–117)
GLUCOSE BLD STRIP.AUTO-MCNC: 88 MG/DL (ref 65–117)
GLUCOSE SERPL-MCNC: 97 MG/DL (ref 65–100)
HBA1C MFR BLD: 5.3 % (ref 4–5.6)
HCT VFR BLD AUTO: 41.3 % (ref 35–47)
HDLC SERPL-MCNC: 58 MG/DL
HDLC SERPL: 4.1 (ref 0–5)
HGB BLD-MCNC: 13.5 G/DL (ref 11.5–16)
LDLC SERPL CALC-MCNC: 157.8 MG/DL (ref 0–100)
MCH RBC QN AUTO: 30.1 PG (ref 26–34)
MCHC RBC AUTO-ENTMCNC: 32.7 G/DL (ref 30–36.5)
MCV RBC AUTO: 92.2 FL (ref 80–99)
NRBC # BLD: 0 K/UL (ref 0–0.01)
NRBC BLD-RTO: 0 PER 100 WBC
PLATELET # BLD AUTO: 242 K/UL (ref 150–400)
PMV BLD AUTO: 9.5 FL (ref 8.9–12.9)
POTASSIUM SERPL-SCNC: 3.9 MMOL/L (ref 3.5–5.1)
RBC # BLD AUTO: 4.48 M/UL (ref 3.8–5.2)
SERVICE CMNT-IMP: ABNORMAL
SERVICE CMNT-IMP: NORMAL
SERVICE CMNT-IMP: NORMAL
SODIUM SERPL-SCNC: 139 MMOL/L (ref 136–145)
TRIGL SERPL-MCNC: 111 MG/DL
VIT B12 SERPL-MCNC: 371 PG/ML (ref 193–986)
VLDLC SERPL CALC-MCNC: 22.2 MG/DL
WBC # BLD AUTO: 9.1 K/UL (ref 3.6–11)

## 2023-08-28 PROCEDURE — 92522 EVALUATE SPEECH PRODUCTION: CPT | Performed by: SPEECH-LANGUAGE PATHOLOGIST

## 2023-08-28 PROCEDURE — 82140 ASSAY OF AMMONIA: CPT

## 2023-08-28 PROCEDURE — 95816 EEG AWAKE AND DROWSY: CPT

## 2023-08-28 PROCEDURE — 80061 LIPID PANEL: CPT

## 2023-08-28 PROCEDURE — 83036 HEMOGLOBIN GLYCOSYLATED A1C: CPT

## 2023-08-28 PROCEDURE — 6370000000 HC RX 637 (ALT 250 FOR IP): Performed by: INTERNAL MEDICINE

## 2023-08-28 PROCEDURE — 97535 SELF CARE MNGMENT TRAINING: CPT

## 2023-08-28 PROCEDURE — 95819 EEG AWAKE AND ASLEEP: CPT | Performed by: PSYCHIATRY & NEUROLOGY

## 2023-08-28 PROCEDURE — 36415 COLL VENOUS BLD VENIPUNCTURE: CPT

## 2023-08-28 PROCEDURE — 2580000003 HC RX 258: Performed by: INTERNAL MEDICINE

## 2023-08-28 PROCEDURE — 80048 BASIC METABOLIC PNL TOTAL CA: CPT

## 2023-08-28 PROCEDURE — 85027 COMPLETE CBC AUTOMATED: CPT

## 2023-08-28 PROCEDURE — 82607 VITAMIN B-12: CPT

## 2023-08-28 PROCEDURE — 93308 TTE F-UP OR LMTD: CPT

## 2023-08-28 PROCEDURE — 82962 GLUCOSE BLOOD TEST: CPT

## 2023-08-28 PROCEDURE — 97165 OT EVAL LOW COMPLEX 30 MIN: CPT

## 2023-08-28 PROCEDURE — 99222 1ST HOSP IP/OBS MODERATE 55: CPT | Performed by: PSYCHIATRY & NEUROLOGY

## 2023-08-28 RX ORDER — ESCITALOPRAM OXALATE 10 MG/1
20 TABLET ORAL DAILY
Status: DISCONTINUED | OUTPATIENT
Start: 2023-08-28 | End: 2023-08-28 | Stop reason: HOSPADM

## 2023-08-28 RX ORDER — LEVOTHYROXINE SODIUM 0.1 MG/1
100 TABLET ORAL
Status: DISCONTINUED | OUTPATIENT
Start: 2023-08-28 | End: 2023-08-28 | Stop reason: HOSPADM

## 2023-08-28 RX ORDER — PRAMIPEXOLE DIHYDROCHLORIDE 0.25 MG/1
0.5 TABLET ORAL
Status: DISCONTINUED | OUTPATIENT
Start: 2023-08-28 | End: 2023-08-28 | Stop reason: HOSPADM

## 2023-08-28 RX ADMIN — ESCITALOPRAM OXALATE 20 MG: 10 TABLET ORAL at 09:22

## 2023-08-28 RX ADMIN — LEVOTHYROXINE SODIUM 100 MCG: 0.1 TABLET ORAL at 06:42

## 2023-08-28 RX ADMIN — SODIUM CHLORIDE, PRESERVATIVE FREE 10 ML: 5 INJECTION INTRAVENOUS at 09:23

## 2023-08-28 NOTE — DISCHARGE SUMMARY
atherosclerosis. Questionable fibromuscular dysplasia. Brain MRI read by radiology FINDINGS:   No restricted diffusion to suggest acute infarction. Scattered  periventricular and subcortical white matter signal abnormalities are consistent  with chronic small vessel ischemic disease. The ventricles and sulci are  appropriate for age. The main intracranial arterial flow-voids are normal. No  hemorrhage. IMPRESSION:  No acute intracranial abnormality. Echo TTE read by cardiology   Left Ventricle Normal left ventricular systolic function with a visually estimated EF of 55 - 60%. Normal wall motion. Left Atrium Left atrium is moderately dilated. Right Ventricle Right ventricle size is normal. Normal systolic function. Right Atrium Right atrium size is normal.   Aortic Valve Valve structure is normal. No regurgitation. No stenosis. Mitral Valve Moderate annular calcification at the posterior leaflet of the mitral valve. Mild regurgitation. Tricuspid Valve Valve structure is normal. Mild regurgitation. No stenosis noted. Pericardium No pericardial effusion. Hospital course:      Acute metabolic encephalopathy POA  Suspected early dementia POA  -Patient presents with progressive confusion and forgetfulness, worse over the last 3 weeks. Recently treated for UTI with bactrim, finished several days ago. - ? mild cognitive impairment or early dementia, seizures or TIA. -CT of head IMPRESSION:  No acute intracranial abnormality. -CTA head and neck  No evidence for acute large vessel arterial occlusion. Mild atherosclerosis. Questionable fibromuscular dysplasia.   -Urinalysis 5-10 WBC, negative bacteria, culture not sent  -TSH 9.76  - Ammonia 20  - Vitamin B12 371  -   - HgBa1c 5.3  -Echo TTE   -Neurology consult  -PT OT speech therapy eval and treat  -Spoke with daughter at bedside, plan discharge to home with outpatient follow up     Hypothyroidism

## 2023-08-29 LAB
ECHO AO ROOT DIAM: 3.1 CM
ECHO AO ROOT INDEX: 1.78 CM/M2
ECHO AV AREA PEAK VELOCITY: 2.3 CM2
ECHO AV AREA/BSA PEAK VELOCITY: 1.3 CM2/M2
ECHO AV PEAK GRADIENT: 10 MMHG
ECHO AV PEAK VELOCITY: 1.6 M/S
ECHO AV VELOCITY RATIO: 0.69
ECHO BSA: 1.78 M2
ECHO LA DIAMETER INDEX: 2.3 CM/M2
ECHO LA DIAMETER: 4 CM
ECHO LA TO AORTIC ROOT RATIO: 1.29
ECHO LA VOL 4C: 38 ML (ref 22–52)
ECHO LA VOL 4C: 42 ML (ref 22–52)
ECHO LV E' SEPTAL VELOCITY: 6 CM/S
ECHO LV FRACTIONAL SHORTENING: 38 % (ref 28–44)
ECHO LV INTERNAL DIMENSION DIASTOLE INDEX: 2.41 CM/M2
ECHO LV INTERNAL DIMENSION DIASTOLIC: 4.2 CM (ref 3.9–5.3)
ECHO LV INTERNAL DIMENSION SYSTOLIC INDEX: 1.49 CM/M2
ECHO LV INTERNAL DIMENSION SYSTOLIC: 2.6 CM
ECHO LV IVSD: 1.2 CM (ref 0.6–0.9)
ECHO LV MASS 2D: 178.2 G (ref 67–162)
ECHO LV MASS INDEX 2D: 102.4 G/M2 (ref 43–95)
ECHO LV POSTERIOR WALL DIASTOLIC: 1.2 CM (ref 0.6–0.9)
ECHO LV RELATIVE WALL THICKNESS RATIO: 0.57
ECHO LVOT AREA: 3.5 CM2
ECHO LVOT DIAM: 2.1 CM
ECHO LVOT PEAK GRADIENT: 5 MMHG
ECHO LVOT PEAK VELOCITY: 1.1 M/S
ECHO MV A VELOCITY: 1.19 M/S
ECHO MV E DECELERATION TIME (DT): 235.2 MS
ECHO MV E VELOCITY: 0.93 M/S
ECHO MV E/A RATIO: 0.78
ECHO MV E/E' SEPTAL: 15.5
ECHO PV MAX VELOCITY: 0.8 M/S
ECHO PV PEAK GRADIENT: 2 MMHG
ECHO RV INTERNAL DIMENSION: 3.1 CM
ECHO RV TAPSE: 1.8 CM (ref 1.7–?)
ECHO TV REGURGITANT MAX VELOCITY: 2.2 M/S
ECHO TV REGURGITANT PEAK GRADIENT: 19 MMHG

## 2023-08-29 NOTE — CONSULTS
Hypothyroid 10/19/2017    Irritable bowel syndrome with constipation 10/19/2017    Knee pain 10/19/2017    Menopausal disorder 10/19/2017    Menopause     Migraine 10/19/2017    Neurological disorder     migraines    On statin therapy 6/18/2019    Other ill-defined conditions(799.89)     Other ill-defined conditions(799.89)     hypothyroid    Other ill-defined conditions(799.89)     elevatd cholesterol    RLS (restless legs syndrome) 9/21/2022    Urinary frequency 10/19/2017      Past Surgical History:   Procedure Laterality Date    BREAST BIOPSY Left 11 years ago     negative    CATARACT REMOVAL Left 07/2020    COLONOSCOPY Left 6/1/2018    COLONOSCOPY performed by Raza Uribe MD at [facility]      hysterectomy    HYSTERECTOMY ([facility])      ORTHOPEDIC SURGERY  11/9/11    SPINE LUMBAR LAMINECTOMY - L4-5 LAMINECTOMY FOR REMOVAL OF EXTRA DURAL LESION ON THE LEFT - benign    OTHER SURGICAL HISTORY      ganglion cyst removed finger    OVARY REMOVAL      NC UNLISTED PROCEDURE ABDOMEN PERITONEUM & OMENTUM  2011    removed \"sludge\" from duct in gallbladder    TONSILLECTOMY       Family History   Problem Relation Age of Onset    Heart Disease Mother         \"shrinking heart valve\"      Social History     Tobacco Use    Smoking status: Never    Smokeless tobacco: Never   Substance Use Topics    Alcohol use: Yes         Current Facility-Administered Medications:     escitalopram (LEXAPRO) tablet 20 mg, 20 mg, Oral, Daily, Cristiane Lindsey MD, 20 mg at 08/28/23 4240    levothyroxine (SYNTHROID) tablet 100 mcg, 100 mcg, Oral, QAM AC, Cristiane Lindsye MD, 100 mcg at 08/28/23 0456    pramipexole (MIRAPEX) tablet 0.5 mg, 0.5 mg, Oral, QHS, Cristiane Lindsey MD    acetaminophen (TYLENOL) tablet 650 mg, 650 mg, Oral, Q6H PRN, [facility], DO    sodium chloride flush 0.9 % injection 5-40 mL, 5-40 mL, IntraVENous, 2 times per day, Cristiane Lindsey MD, 10 mL at 08/28/23 0923    sodium chloride flush 0.9 % injection

## 2023-08-29 NOTE — PROCEDURES
[facility]  EEG    Name:  Joanna Aleman  MR#:  [id number]  :  1945  ACCOUNT #:  [de-identified]  DATE OF SERVICE:  2023      CLINICAL INDICATION:  The patient is a 75-year-old female with history of memory loss, confusion, possible seizures, possible partial complex seizures. EEG to rule out encephalopathy, rule out seizures. EEG CLASSIFICATION:  On this patient is dysrhythmia grade I, generalized. DESCRIPTION OF THE RECORD:  This is a 16-channel EEG recording on the patient to evaluate for possible seizures. The patient had a posteriorly located occipital alpha rhythm of 8-9 Hz that did attenuate with eye opening in the awake state. During the recording, there were no clear areas of focal slowing. No clear spike or spike-and-wave discharges seen and no recorded dysrhythmic or electrographic spells of any type were seen. The patient did enter brief states of sleep with K complexes and sleep spindles seen in the central head regions. There was a mild increase in some generalized 3-6 Hz activity seen throughout the recording without any clear areas of focal slowing noted. No spike or spike-and-wave discharges seen. Hyperventilation was not performed. Photic stimulation produced a mild driving response. INTERPRETATION:  This is a mildly abnormal electroencephalogram due to the generalized slowing seen most consistent with diffuse encephalopathy of toxic metabolic or degenerative type. No clear focal process or epileptiform activity was seen. Clinical correlation recommended. Sue Pfeiffer MD      [id number]  D:  2023 7:15  T:  2023 9:50  JOB #:  [id number]  CC:   Sue Pfeiffer MD

## 2023-08-30 ENCOUNTER — OFFICE VISIT (OUTPATIENT)
Facility: CLINIC | Age: 78
End: 2023-08-30

## 2023-08-30 VITALS
DIASTOLIC BLOOD PRESSURE: 72 MMHG | OXYGEN SATURATION: 96 % | RESPIRATION RATE: 16 BRPM | HEIGHT: 62 IN | HEART RATE: 77 BPM | BODY MASS INDEX: 29.7 KG/M2 | SYSTOLIC BLOOD PRESSURE: 120 MMHG | TEMPERATURE: 97.7 F | WEIGHT: 161.4 LBS

## 2023-08-30 DIAGNOSIS — F41.9 ANXIETY: ICD-10-CM

## 2023-08-30 DIAGNOSIS — E55.9 VITAMIN D DEFICIENCY: ICD-10-CM

## 2023-08-30 DIAGNOSIS — Z76.89 ENCOUNTER FOR SUPPORT AND COORDINATION OF TRANSITION OF CARE: Primary | ICD-10-CM

## 2023-08-30 DIAGNOSIS — E03.4 HYPOTHYROIDISM DUE TO ACQUIRED ATROPHY OF THYROID: ICD-10-CM

## 2023-08-30 RX ORDER — BUSPIRONE HYDROCHLORIDE 10 MG/1
10 TABLET ORAL 2 TIMES DAILY
Qty: 60 TABLET | Refills: 0 | Status: SHIPPED | OUTPATIENT
Start: 2023-08-30 | End: 2023-09-29

## 2023-08-30 RX ORDER — ERGOCALCIFEROL 1.25 MG/1
50000 CAPSULE ORAL
Qty: 12 CAPSULE | Refills: 0 | Status: SHIPPED | OUTPATIENT
Start: 2023-08-30

## 2023-08-30 NOTE — PROGRESS NOTES
Conner Barber is a 66 y.o. female presenting for Follow-Up from [facility] (ER 8-28-23)  . 1. Have you been to the ER, urgent care clinic since your last visit? Hospitalized since your last visit? ER 8-28-23    2. Have you seen or consulted any other health care providers outside of the [address] [place] [address] since your last visit? Include any pap smears or colon screening.  No
rate, regular rhythm, normal S1, S2, no murmurs, rubs, clicks or gallops   Abdomen - soft, nontender, nondistended, no masses or organomegaly  Lymph- no adenopathy palpable  Ext-peripheral pulses normal, no pedal edema, no clubbing or cyanosis  Skin-Warm and dry. no hyperpigmentation, vitiligo, or suspicious lesions  Neuro -alert, oriented, normal speech, no focal findings or movement disorder noted      Assessment/Plan:  Vicky Bolaños was seen today for follow-up from hospital.    Diagnoses and all orders for this visit:    Encounter for support and coordination of transition of care    Vitamin D deficiency  -     ergocalciferol (ERGOCALCIFEROL) 1.25 MG (37506 UT) capsule; Take 1 capsule by mouth every 7 days    Anxiety  -     busPIRone (BUSPAR) 10 MG tablet; Take 1 tablet by mouth 2 times daily    Hypothyroidism due to acquired atrophy of thyroid          ICD-10-CM    1. Encounter for support and coordination of transition of care  Z76.89       2. Vitamin D deficiency  E55.9 ergocalciferol (ERGOCALCIFEROL) 1.25 MG (70431 UT) capsule      3. Anxiety  F41.9 busPIRone (BUSPAR) 10 MG tablet      4. Hypothyroidism due to acquired atrophy of thyroid  E03.4           Plan: We will add BuSpar 10 mg twice daily to Lexapro 20 mg daily. She does have significant underlying anxiety component that may be contributing. For the time being she will not drive. She will continue her vitamin D replacement and we will follow-up in 1 month for reevaluation. She will follow-up with neurology as planned. She will take Synthroid on an empty stomach and wait at least an hour before having breakfast.      I have reviewed with the patient details of the assessment and plan and all questions were answered. Relevent patient education was performed. Verbal and/or written instructions (see AVS) provided. The most recent lab findings were reviewed with the patient. Plan was discussed with patient who verbally expressed understanding.     An

## 2023-09-14 NOTE — TELEPHONE ENCOUNTER
Last Refill: 3-13-23  Last Visit: 8/30/2023   Next Visit: 9/29/2023     Requested Prescriptions     Pending Prescriptions Disp Refills    escitalopram (LEXAPRO) 20 MG tablet [Pharmacy Med Name: ESCITALOPRAM 20 MG TABLET] 90 tablet 1     Sig: TAKE 1 TABLET BY MOUTH EVERY DAY FOR REPEATED EPISODES OF ANXIETY

## 2023-09-15 RX ORDER — ESCITALOPRAM OXALATE 20 MG/1
TABLET ORAL
Qty: 90 TABLET | Refills: 1 | Status: SHIPPED | OUTPATIENT
Start: 2023-09-15

## 2023-09-18 ENCOUNTER — TELEPHONE (OUTPATIENT)
Facility: CLINIC | Age: 78
End: 2023-09-18

## 2023-09-18 NOTE — TELEPHONE ENCOUNTER
----- Message from Bienvenido Lee sent at 9/18/2023  9:33 AM EDT -----  Subject: Message to Provider    QUESTIONS  Information for Provider? I have Jyoti Ramos the daughter of Oriana Petersen. She   found her mom on Friday that she had taken too many of her meds. She   stayed with her patient slept for 16 hours. Jyoti Ramos would like a private   phone call from Dr. Chelsea Bolaños as she feels that her mother is slipping. Please   call Jyoti Ramos [phone]  ---------------------------------------------------------------------------  --------------  [employer]  [phone]; OK to leave message on voicemail  ---------------------------------------------------------------------------  --------------  SCRIPT ANSWERS  Relationship to Patient? Other/Third Party  Representative Name? Jyoti Wyattney  Is the representative on the Communication Release of Information (CHELI)   form in Epic?  Yes

## 2023-09-25 RX ORDER — LEVOTHYROXINE SODIUM 0.1 MG/1
100 TABLET ORAL
Qty: 90 TABLET | Refills: 3 | Status: SHIPPED | OUTPATIENT
Start: 2023-09-25

## 2023-09-25 RX ORDER — PRAMIPEXOLE DIHYDROCHLORIDE 0.5 MG/1
TABLET ORAL
Qty: 90 TABLET | Refills: 3 | Status: SHIPPED | OUTPATIENT
Start: 2023-09-25

## 2023-09-25 NOTE — TELEPHONE ENCOUNTER
Last Refill: Pramipexole 10-14-22, Levothyroxine 11-9-22  Last Visit: 8/30/2023   Next Visit: 9/29/2023     Requested Prescriptions     Pending Prescriptions Disp Refills    pramipexole (MIRAPEX) 0.5 MG tablet [Pharmacy Med Name: PRAMIPEXOLE 0.5 MG TABLET] 90 tablet 3     Sig: TAKE 1 TABLET BY MOUTH EVERY DAY AT BEDTIME FOR RESTLESS LEGS    levothyroxine (SYNTHROID) 100 MCG tablet [Pharmacy Med Name: LEVOTHYROXINE 100 MCG TABLET] 90 tablet 3     Sig: TAKE 1 TABLET BY MOUTH EVERY DAY BEFORE BREAKFAST

## 2023-10-02 ENCOUNTER — NURSE ONLY (OUTPATIENT)
Facility: CLINIC | Age: 78
End: 2023-10-02

## 2023-10-02 DIAGNOSIS — R35.0 FREQUENCY OF MICTURITION: Primary | ICD-10-CM

## 2023-10-03 ENCOUNTER — TELEPHONE (OUTPATIENT)
Facility: CLINIC | Age: 78
End: 2023-10-03

## 2023-10-03 LAB
AMORPH CRY URNS QL MICRO: ABNORMAL
APPEARANCE UR: ABNORMAL
BACTERIA URNS QL MICRO: ABNORMAL /HPF
BILIRUB UR QL CFM: NEGATIVE
COLOR UR: ABNORMAL
EPITH CASTS URNS QL MICRO: ABNORMAL /LPF
GLUCOSE UR STRIP.AUTO-MCNC: NEGATIVE MG/DL
HGB UR QL STRIP: ABNORMAL
KETONES UR QL STRIP.AUTO: ABNORMAL MG/DL
LEUKOCYTE ESTERASE UR QL STRIP.AUTO: NEGATIVE
NITRITE UR QL STRIP.AUTO: NEGATIVE
PH UR STRIP: 6 (ref 5–8)
PROT UR STRIP-MCNC: 30 MG/DL
RBC #/AREA URNS HPF: ABNORMAL /HPF (ref 0–5)
SP GR UR REFRACTOMETRY: 1.02 (ref 1–1.03)
URINE CULTURE IF INDICATED: ABNORMAL
UROBILINOGEN UR QL STRIP.AUTO: 1 EU/DL (ref 0.2–1)
WBC URNS QL MICRO: ABNORMAL /HPF (ref 0–4)

## 2023-10-04 DIAGNOSIS — R35.0 FREQUENCY OF MICTURITION: Primary | ICD-10-CM

## 2023-10-04 RX ORDER — CEFUROXIME AXETIL 250 MG/1
250 TABLET ORAL 2 TIMES DAILY
Qty: 14 TABLET | Refills: 0 | Status: SHIPPED | OUTPATIENT
Start: 2023-10-04 | End: 2023-10-05 | Stop reason: SDUPTHER

## 2023-10-05 ENCOUNTER — OFFICE VISIT (OUTPATIENT)
Facility: CLINIC | Age: 78
End: 2023-10-05
Payer: MEDICARE

## 2023-10-05 VITALS
RESPIRATION RATE: 18 BRPM | BODY MASS INDEX: 27.79 KG/M2 | OXYGEN SATURATION: 98 % | DIASTOLIC BLOOD PRESSURE: 70 MMHG | WEIGHT: 151 LBS | HEART RATE: 81 BPM | SYSTOLIC BLOOD PRESSURE: 126 MMHG | HEIGHT: 62 IN | TEMPERATURE: 97.8 F

## 2023-10-05 DIAGNOSIS — Z23 NEEDS FLU SHOT: ICD-10-CM

## 2023-10-05 DIAGNOSIS — E55.9 VITAMIN D DEFICIENCY: Primary | ICD-10-CM

## 2023-10-05 DIAGNOSIS — E03.4 HYPOTHYROIDISM DUE TO ACQUIRED ATROPHY OF THYROID: ICD-10-CM

## 2023-10-05 DIAGNOSIS — K21.9 GASTROESOPHAGEAL REFLUX DISEASE WITHOUT ESOPHAGITIS: ICD-10-CM

## 2023-10-05 DIAGNOSIS — R35.0 FREQUENCY OF MICTURITION: ICD-10-CM

## 2023-10-05 DIAGNOSIS — F41.9 ANXIETY: ICD-10-CM

## 2023-10-05 PROCEDURE — 1090F PRES/ABSN URINE INCON ASSESS: CPT | Performed by: INTERNAL MEDICINE

## 2023-10-05 PROCEDURE — G0008 ADMIN INFLUENZA VIRUS VAC: HCPCS | Performed by: INTERNAL MEDICINE

## 2023-10-05 PROCEDURE — 1036F TOBACCO NON-USER: CPT | Performed by: INTERNAL MEDICINE

## 2023-10-05 PROCEDURE — G8427 DOCREV CUR MEDS BY ELIG CLIN: HCPCS | Performed by: INTERNAL MEDICINE

## 2023-10-05 PROCEDURE — G8419 CALC BMI OUT NRM PARAM NOF/U: HCPCS | Performed by: INTERNAL MEDICINE

## 2023-10-05 PROCEDURE — 1123F ACP DISCUSS/DSCN MKR DOCD: CPT | Performed by: INTERNAL MEDICINE

## 2023-10-05 PROCEDURE — 90694 VACC AIIV4 NO PRSRV 0.5ML IM: CPT | Performed by: INTERNAL MEDICINE

## 2023-10-05 PROCEDURE — G8484 FLU IMMUNIZE NO ADMIN: HCPCS | Performed by: INTERNAL MEDICINE

## 2023-10-05 PROCEDURE — 99214 OFFICE O/P EST MOD 30 MIN: CPT | Performed by: INTERNAL MEDICINE

## 2023-10-05 PROCEDURE — G8399 PT W/DXA RESULTS DOCUMENT: HCPCS | Performed by: INTERNAL MEDICINE

## 2023-10-05 RX ORDER — ERGOCALCIFEROL 1.25 MG/1
50000 CAPSULE ORAL
Qty: 12 CAPSULE | Refills: 0
Start: 2023-10-05

## 2023-10-05 RX ORDER — CEFUROXIME AXETIL 250 MG/1
250 TABLET ORAL 2 TIMES DAILY
Qty: 14 TABLET | Refills: 0 | Status: SHIPPED | OUTPATIENT
Start: 2023-10-05 | End: 2023-10-12

## 2023-10-05 RX ORDER — PRAMIPEXOLE DIHYDROCHLORIDE 0.5 MG/1
TABLET ORAL
Qty: 90 TABLET | Refills: 3
Start: 2023-10-05

## 2023-10-05 RX ORDER — CEFUROXIME AXETIL 250 MG/1
250 TABLET ORAL 2 TIMES DAILY
Qty: 14 TABLET | Refills: 0 | Status: CANCELLED | OUTPATIENT
Start: 2023-10-05 | End: 2023-10-12

## 2023-10-05 RX ORDER — VENLAFAXINE HYDROCHLORIDE 75 MG/1
75 CAPSULE, EXTENDED RELEASE ORAL DAILY
Qty: 90 CAPSULE | Refills: 0 | Status: SHIPPED | OUTPATIENT
Start: 2023-10-05 | End: 2024-01-03

## 2023-10-05 NOTE — PROGRESS NOTES
Paulina Talavera is a 66 y.o. female presenting for 1 Month Follow-Up and Anxiety  . 1. Have you been to the ER, urgent care clinic since your last visit? Hospitalized since your last visit? No    2. Have you seen or consulted any other health care providers outside of the [address] [place] [address] since your last visit? Include any pap smears or colon screening. No    After obtaining written consent and per orders of Dr. Fabiana Gonsales, injection of Fluad given by Gucci Fernandez MA. Patient tolerated procedure well. VIS was given to them. No reactions noted.
patient details of the assessment and plan and all questions were answered. Relevent patient education was performed. Verbal and/or written instructions (see AVS) provided. The most recent lab findings were reviewed with the patient. Plan was discussed with patient who verbally expressed understanding. An After Visit Summary was printed and given to the patient.     Dash Sheikh MD

## 2023-10-12 ENCOUNTER — OFFICE VISIT (OUTPATIENT)
Age: 78
End: 2023-10-12
Payer: MEDICARE

## 2023-10-12 VITALS
TEMPERATURE: 97.5 F | OXYGEN SATURATION: 95 % | DIASTOLIC BLOOD PRESSURE: 82 MMHG | BODY MASS INDEX: 27.97 KG/M2 | HEIGHT: 62 IN | WEIGHT: 152 LBS | RESPIRATION RATE: 17 BRPM | HEART RATE: 84 BPM | SYSTOLIC BLOOD PRESSURE: 158 MMHG

## 2023-10-12 DIAGNOSIS — Z09 HOSPITAL DISCHARGE FOLLOW-UP: ICD-10-CM

## 2023-10-12 DIAGNOSIS — R41.3 MEMORY LOSS: Primary | ICD-10-CM

## 2023-10-12 DIAGNOSIS — E55.9 VITAMIN D DEFICIENCY: ICD-10-CM

## 2023-10-12 DIAGNOSIS — F41.9 ANXIETY: ICD-10-CM

## 2023-10-12 DIAGNOSIS — G25.81 RLS (RESTLESS LEGS SYNDROME): ICD-10-CM

## 2023-10-12 PROCEDURE — 99215 OFFICE O/P EST HI 40 MIN: CPT

## 2023-10-12 PROCEDURE — 1123F ACP DISCUSS/DSCN MKR DOCD: CPT

## 2023-10-12 ASSESSMENT — ENCOUNTER SYMPTOMS
EYES NEGATIVE: 1
ALLERGIC/IMMUNOLOGIC NEGATIVE: 1
RESPIRATORY NEGATIVE: 1
GASTROINTESTINAL NEGATIVE: 1

## 2023-10-12 ASSESSMENT — PATIENT HEALTH QUESTIONNAIRE - PHQ9
1. LITTLE INTEREST OR PLEASURE IN DOING THINGS: 0
SUM OF ALL RESPONSES TO PHQ QUESTIONS 1-9: 0
2. FEELING DOWN, DEPRESSED OR HOPELESS: 0
SUM OF ALL RESPONSES TO PHQ9 QUESTIONS 1 & 2: 0
SUM OF ALL RESPONSES TO PHQ QUESTIONS 1-9: 0

## 2023-10-12 NOTE — PROGRESS NOTES
bone deformities: No   Spinal curvature: No     Integumentary:    Obvious bruising: No   Lacerations or discoloration on casual observation: No       Neurological Examination:   Mental Status:        MOCA testing performed  today  Patient scored 23/30  She was not able to draw the cube  She was not able to solve any of the subtractions  She was not able to repeat one of the sentence given fully [the cat always hid under the couch when dogs were in the room]  She was able to remember 3 out of the 5 words given [face, velvet, and red]  [She was able to name 12 words in 1 minute that began with letter F]    Formal testing was not completed    there was nothing concerning on general observation and discussion. Alert oriented and appropriate to general conversation  Normal processing on general observation  Followed conversation and responded seemingly appropriate throughout the office visit  No word finding difficulties noted on casual observation  Able to follow directions without difficulty     She was alert and oriented to self, place, time, date, and president. She was able to solve simple math questions but had some difficulty  She was not able to say days of the week backwards      Cranial Nerves:    EOMs intact gaze is conjugate  No nystagmus is appreciated  Facial motor intact bilaterally  Hearing intact to conversation  Voice with normal projection, no evidence of secretion pooling  Shoulder shrug intact bilaterally  No tongue deviation appreciated     Motor:   Normal bulk  No tremor appreciated on today's exam  No abnormal movements appreciated on today's exam  Moves extremities spontaneously and with purpose  5/5 x 4      Sensation: Intact to light touch    Coordination/Cerebellar: Finger-to-nose intact bilateral    Gait: Ambulates independently.   Steady    Reflexes:       Biceps Triceps Brachiorad Knee Achilles   Right 2+ 2+ 2+ 2+ 1+   Left 2+ 2+ 2+ 2+ 1+       Fall risk assessment  No flowsheet data

## 2023-10-12 NOTE — ASSESSMENT & PLAN NOTE
Patient verbalized she has been doing well since discharge from the hospital.    She remains active and she continues to remain independent with minimal assistance from family. She continues to forget dates and times but family put in place a process in which she can take her medications without taking them multiple times. She has a fall after getting her flu shot. She denied any injury. Fall safety was discussed with patient. She has been followed closely with her primary care provider for anxiety, thyroid, and vitamin D deficiency management. Labs and imaging were reviewed with patient today. There is no additional work-up needed at this time. Will refer patient to neuropsych evaluation to rule out any other etiology of her memory loss.

## 2023-10-12 NOTE — ASSESSMENT & PLAN NOTE
Patient's memory loss may be multifactorial.    She was recently diagnosed with urinary tract infection in which made her very confused and with her having difficulty remembering times and dates, she ended up taking her medications multiple times during the day. All these factors which included UTI, overuse of medications, and severe anxiety may worsen her memory loss. We will refer patient for neuropsych testing with Dr. Barbie Goncalves to rule out any other factors that may contribute to her memory loss. Neurocognitive medications were reviewed with patient and family today. Given patient just now restarting on her medications, will not add any new medication at this time. Will address this after neuropsych testing evaluation. Patient is to continue to follow-up with primary care provider and take her antibiotics as prescribed for her UTI. Will not repeat any labs or imaging at this time. She is on supplementation for Vitamin D and her thyroid has been managed by primary care provider. TSH: 15.4, free T4: 1.1, B12: 371    Brain MRI completed on 8/27/2023 showed no acute intracranial abnormality. Scattered periventricular and subcortical white matter signal abnormalities are consistent with chronic small vessel ischemic disease. Patient was encouraged to engage in approximately 2.5 hours of physician approved physical activity on a weekly basis. Patient was encouraged to maintain a healthy diet. they will also be informed of the Mediterranean diet, which has been associated with reduced risk for neurodegenerative conditions as well as heart disease. Patient was encouraged to maintain a cognitively stimulated lifestyle, also incorporating social interaction. poor endurance/decreased strength

## 2023-10-12 NOTE — ASSESSMENT & PLAN NOTE
Stable    Well managed on pramipexole 0.5 mg at bedtime. No intervention needed at this time. Patient is to continue to follow-up with PCP as appropriate.

## 2023-10-12 NOTE — ASSESSMENT & PLAN NOTE
Stable    She takes venlafaxine XR 75 mg daily. Patient is to continue to follow-up with PCP as appropriate. Stress management exercises were discussed with patient and family.

## 2023-10-12 NOTE — ASSESSMENT & PLAN NOTE
Last vitamin D level was 11.5 on 8/22/2023    Patient was started on ergocalciferol 1.25 mg (50,000 UT) every 7 days. She denied any side effects. This has been managed by her PCP. She is to continue to follow-up with PCP as appropriate.

## 2023-10-12 NOTE — PATIENT INSTRUCTIONS
As per our discussion,    Overall, you did very well today. I will not repeat any labs or imaging today, but I will refer you to neuropsych evaluation to see what else contributing to your memory loss. Will not start on any medication at this time and will wait to see what neuropsych shows especially when you are starting back taking your other medications. I encouraged you  to engage in approximately 2.5 hours of physician approved physical activity on a weekly basis. Maintain a healthy diet. they will also be informed of the Mediterranean diet, which has been associated with reduced risk for neurodegenerative conditions as well as heart disease. Maintain a cognitively stimulated lifestyle, also incorporating social interaction. It was a pleasure meeting you and your daughter today    I will see you back after neuropsych testing. Somebody would help you and schedule both appointments. I will see you back in 6-8 months or sooner if needed. Please do not hesitate to reach out for any questions or concerns.

## 2023-10-24 ENCOUNTER — HOSPITAL ENCOUNTER (OUTPATIENT)
Facility: HOSPITAL | Age: 78
Discharge: HOME | End: 2023-10-27
Attending: INTERNAL MEDICINE
Payer: MEDICARE

## 2023-10-24 VITALS — WEIGHT: 152 LBS | BODY MASS INDEX: 27.97 KG/M2 | HEIGHT: 62 IN

## 2023-10-24 DIAGNOSIS — Z12.31 VISIT FOR SCREENING MAMMOGRAM: ICD-10-CM

## 2023-10-24 PROCEDURE — 77067 SCR MAMMO BI INCL CAD: CPT

## 2023-11-01 DIAGNOSIS — R41.3 MEMORY LOSS: Primary | ICD-10-CM

## 2023-11-01 RX ORDER — DONEPEZIL HYDROCHLORIDE 5 MG/1
5 TABLET, FILM COATED ORAL NIGHTLY
Qty: 30 TABLET | Refills: 5 | Status: SHIPPED | OUTPATIENT
Start: 2023-11-01

## 2023-11-01 NOTE — PROGRESS NOTES
As discussed to patient at the last visit he will be beneficial to start on a neurocognitive medication for her memory loss. Patient and family deferred at that time but, reached out to the portal that patient continues to experience memory loss and has been worsening, and would like to see if patient can start on a medication. We will start her on donepezil 5 mg at bedtime. Prescription was sent to her pharmacy.

## 2023-11-06 ENCOUNTER — OFFICE VISIT (OUTPATIENT)
Facility: CLINIC | Age: 78
End: 2023-11-06
Payer: MEDICARE

## 2023-11-06 VITALS
TEMPERATURE: 98.2 F | RESPIRATION RATE: 20 BRPM | OXYGEN SATURATION: 97 % | DIASTOLIC BLOOD PRESSURE: 80 MMHG | SYSTOLIC BLOOD PRESSURE: 122 MMHG | BODY MASS INDEX: 27.86 KG/M2 | WEIGHT: 151.4 LBS | HEIGHT: 62 IN | HEART RATE: 67 BPM

## 2023-11-06 DIAGNOSIS — F41.9 ANXIETY: ICD-10-CM

## 2023-11-06 DIAGNOSIS — E03.4 HYPOTHYROIDISM DUE TO ACQUIRED ATROPHY OF THYROID: ICD-10-CM

## 2023-11-06 DIAGNOSIS — E55.9 VITAMIN D DEFICIENCY: Primary | ICD-10-CM

## 2023-11-06 PROCEDURE — 1123F ACP DISCUSS/DSCN MKR DOCD: CPT | Performed by: INTERNAL MEDICINE

## 2023-11-06 PROCEDURE — G8484 FLU IMMUNIZE NO ADMIN: HCPCS | Performed by: INTERNAL MEDICINE

## 2023-11-06 PROCEDURE — 1036F TOBACCO NON-USER: CPT | Performed by: INTERNAL MEDICINE

## 2023-11-06 PROCEDURE — 1090F PRES/ABSN URINE INCON ASSESS: CPT | Performed by: INTERNAL MEDICINE

## 2023-11-06 PROCEDURE — G8419 CALC BMI OUT NRM PARAM NOF/U: HCPCS | Performed by: INTERNAL MEDICINE

## 2023-11-06 PROCEDURE — G8399 PT W/DXA RESULTS DOCUMENT: HCPCS | Performed by: INTERNAL MEDICINE

## 2023-11-06 PROCEDURE — G8427 DOCREV CUR MEDS BY ELIG CLIN: HCPCS | Performed by: INTERNAL MEDICINE

## 2023-11-06 PROCEDURE — 99214 OFFICE O/P EST MOD 30 MIN: CPT | Performed by: INTERNAL MEDICINE

## 2023-11-06 RX ORDER — VENLAFAXINE HYDROCHLORIDE 150 MG/1
150 CAPSULE, EXTENDED RELEASE ORAL DAILY
Qty: 90 CAPSULE | Refills: 0 | Status: SHIPPED | OUTPATIENT
Start: 2023-11-06 | End: 2024-02-04

## 2023-11-06 NOTE — PROGRESS NOTES
Nito Currie is a 66 y.o. female and presents with 1 Month Follow-Up  . Subjective:  Mrs. Jay Powers presents today for 1 month follow-up. She is continue to have problems with short-term memory. She has been seen by neurology. Initial work-up has shown a elevation of her TSH and a low vitamin D. She has a history of hypothyroidism and it is not clear if she was taking her levothyroxine on a regular basis. She is now taking 100 mcg daily and has a automated pillbox that will only allow her to take certain medications at certain times so as to minimize her chance of not taking her medications correctly. She is planning on traveling to [place] to be with her other daughter for about a month. She is on venlafaxine for some underlying depression and anxiety and although there has been some improvement in her daughter feels that she needs to be on a higher dose. We will change her dose to 150 mg today and see how she responds.   Past Medical History:   Diagnosis Date    Adult onset hypothyroidism 10/19/2017    Anxiety 7/27/2022    Arthritis     feet and hands    Back pain with radiation 10/19/2017    Breast cyst 6 months ago     right breast     Disorder of bone and cartilage 10/19/2017    GERD (gastroesophageal reflux disease)     High risk medication use 10/19/2017    Hip pain, acute, left 10/19/2017    Hyperlipidemia 10/19/2017    Hypothyroid 10/19/2017    Irritable bowel syndrome with constipation 10/19/2017    Knee pain 10/19/2017    Menopausal disorder 10/19/2017    Menopause     Migraine 10/19/2017    Neurological disorder     migraines    On statin therapy 6/18/2019    Other ill-defined conditions(799.89)     Other ill-defined conditions(799.89)     hypothyroid    Other ill-defined conditions(799.89)     elevatd cholesterol    RLS (restless legs syndrome) 9/21/2022    Urinary frequency 10/19/2017     Past Surgical History:   Procedure Laterality Date    BREAST BIOPSY Left 11 years ago     negative

## 2023-11-06 NOTE — PROGRESS NOTES
Maynor Francisco is a 66 y.o. female presenting for 1 Month Follow-Up  . 1. Have you been to the ER, urgent care clinic since your last visit? Hospitalized since your last visit? No    2. Have you seen or consulted any other health care providers outside of the [address] [place] [address] since your last visit? Include any pap smears or colon screening.  No

## 2023-11-07 LAB
25(OH)D3 SERPL-MCNC: 41.8 NG/ML (ref 30–100)
T4 FREE SERPL-MCNC: 1.5 NG/DL (ref 0.8–1.5)
TSH SERPL DL<=0.05 MIU/L-ACNC: 1.73 UIU/ML (ref 0.36–3.74)

## 2023-11-11 PROBLEM — Z09 HOSPITAL DISCHARGE FOLLOW-UP: Status: RESOLVED | Noted: 2023-10-12 | Resolved: 2023-11-11

## 2023-11-13 ENCOUNTER — TELEPHONE (OUTPATIENT)
Age: 78
End: 2023-11-13

## 2023-11-13 NOTE — TELEPHONE ENCOUNTER
Patients daughter Alice Ferrer would like for pt to be placed on the waiting list. She can be reached at [phone]

## 2023-11-15 DIAGNOSIS — E55.9 VITAMIN D DEFICIENCY: ICD-10-CM

## 2023-11-15 RX ORDER — ERGOCALCIFEROL 1.25 MG/1
50000 CAPSULE ORAL WEEKLY
Qty: 12 CAPSULE | Refills: 0 | Status: SHIPPED | OUTPATIENT
Start: 2023-11-15

## 2023-11-15 NOTE — TELEPHONE ENCOUNTER
Last Refill: 10-5-23  Last Visit: 11/6/2023   Next Visit: 1/3/2024     Requested Prescriptions     Pending Prescriptions Disp Refills    vitamin D (ERGOCALCIFEROL) 1.25 MG (47409 UT) CAPS capsule [Pharmacy Med Name: VITAMIN D2 1.25MG(50,000 UNIT)] 12 capsule 0     Sig: TAKE 1 CAPSULE BY MOUTH ONE TIME PER WEEK

## 2023-11-21 DIAGNOSIS — R41.3 MEMORY LOSS: Primary | ICD-10-CM

## 2023-11-21 RX ORDER — MEMANTINE HYDROCHLORIDE 5 MG/1
5 TABLET ORAL DAILY
Qty: 30 TABLET | Refills: 5 | Status: SHIPPED | OUTPATIENT
Start: 2023-11-21

## 2023-11-21 NOTE — PROGRESS NOTES
Patient's daughter reach out via portal and verbalized patient started having side effects after starting donepezil. Side effects resolved when she stopped taking donepezil. She was advised to continue to hold donepezil onto the next visit but she reach out to see if patient can be started on something else for her memory. Will start her on Namenda 5 mg daily to see if that helps.     Her appointment with neuropsych for evaluation is scheduled on 5/10/2024

## 2024-01-03 ENCOUNTER — OFFICE VISIT (OUTPATIENT)
Facility: CLINIC | Age: 79
End: 2024-01-03
Payer: MEDICARE

## 2024-01-03 VITALS
OXYGEN SATURATION: 98 % | DIASTOLIC BLOOD PRESSURE: 78 MMHG | BODY MASS INDEX: 29.04 KG/M2 | HEIGHT: 62 IN | RESPIRATION RATE: 16 BRPM | SYSTOLIC BLOOD PRESSURE: 126 MMHG | TEMPERATURE: 97.7 F | HEART RATE: 85 BPM | WEIGHT: 157.8 LBS

## 2024-01-03 DIAGNOSIS — R35.0 FREQUENCY OF MICTURITION: ICD-10-CM

## 2024-01-03 DIAGNOSIS — F41.9 ANXIETY: ICD-10-CM

## 2024-01-03 DIAGNOSIS — R41.3 MEMORY LOSS: ICD-10-CM

## 2024-01-03 DIAGNOSIS — G30.1 ALZHEIMER'S DISEASE WITH LATE ONSET (CODE) (HCC): ICD-10-CM

## 2024-01-03 DIAGNOSIS — E03.4 HYPOTHYROIDISM DUE TO ACQUIRED ATROPHY OF THYROID: ICD-10-CM

## 2024-01-03 DIAGNOSIS — M35.01 SJOGREN'S SYNDROME WITH KERATOCONJUNCTIVITIS SICCA (HCC): ICD-10-CM

## 2024-01-03 DIAGNOSIS — E55.9 VITAMIN D DEFICIENCY: Primary | ICD-10-CM

## 2024-01-03 PROCEDURE — G8484 FLU IMMUNIZE NO ADMIN: HCPCS | Performed by: INTERNAL MEDICINE

## 2024-01-03 PROCEDURE — G8399 PT W/DXA RESULTS DOCUMENT: HCPCS | Performed by: INTERNAL MEDICINE

## 2024-01-03 PROCEDURE — 1036F TOBACCO NON-USER: CPT | Performed by: INTERNAL MEDICINE

## 2024-01-03 PROCEDURE — 1123F ACP DISCUSS/DSCN MKR DOCD: CPT | Performed by: INTERNAL MEDICINE

## 2024-01-03 PROCEDURE — G8427 DOCREV CUR MEDS BY ELIG CLIN: HCPCS | Performed by: INTERNAL MEDICINE

## 2024-01-03 PROCEDURE — G8419 CALC BMI OUT NRM PARAM NOF/U: HCPCS | Performed by: INTERNAL MEDICINE

## 2024-01-03 PROCEDURE — 1090F PRES/ABSN URINE INCON ASSESS: CPT | Performed by: INTERNAL MEDICINE

## 2024-01-03 PROCEDURE — 99214 OFFICE O/P EST MOD 30 MIN: CPT | Performed by: INTERNAL MEDICINE

## 2024-01-03 NOTE — PROGRESS NOTES
Mayra Wagner is a 78 y.o. female presenting for Follow-up Chronic Condition (8 week fu)  .     \"Have you been to the ER, urgent care clinic since your last visit?  Hospitalized since your last visit?\"    NO    “Have you seen or consulted any other health care providers outside of [facility] since your last visit?”    NO

## 2024-01-03 NOTE — PROGRESS NOTES
Mayra Wagner is a 78 y.o. female and presents with Follow-up Chronic Condition (8 week fu)  .    Subjective:  Mayra presents today accompanied by her daughter Alice for follow-up of several problems including dementia, hypothyroidism, vitamin D deficiency, history of recurring UTI.  Since her last visit she did travel to [place] to visit her daughter Libia.  She did well during her visit and when she returned home she seemed to be back to her normal self for at least couple of weeks.  However since that time she has continued to experience hallucinations that usually seem to be at nighttime consistent with sundowning.  She has been seen by neurology and was referred to clinical psychology for further testing.  She was started on donepezil but apparently passed out on this medication and it was discontinued.  She is since started taking Namenda as well and continues to take this.  Her venlafaxine was increased from 75 mg a day to 150 mg daily.  She initially seemed to do very well on this.  She is still living at home and performing ADLs well.  During casual conversation today she noticed that I was wearing new shoes and she was able to recall Everett Cruz as the actor and driving with Carmen.    Past Medical History:   Diagnosis Date    Adult onset hypothyroidism 10/19/2017    Anxiety 7/27/2022    Arthritis     feet and hands    Back pain with radiation 10/19/2017    Breast cyst 6 months ago     right breast     Disorder of bone and cartilage 10/19/2017    GERD (gastroesophageal reflux disease)     High risk medication use 10/19/2017    Hip pain, acute, left 10/19/2017    Hyperlipidemia 10/19/2017    Hypothyroid 10/19/2017    Irritable bowel syndrome with constipation 10/19/2017    Knee pain 10/19/2017    Menopausal disorder 10/19/2017    Menopause     Migraine 10/19/2017    Neurological disorder     migraines    On statin therapy 6/18/2019    Other ill-defined conditions(799.89)     Other ill-defined

## 2024-01-04 LAB
25(OH)D3 SERPL-MCNC: 62.8 NG/ML (ref 30–100)
ALBUMIN SERPL-MCNC: 3.5 G/DL (ref 3.5–5)
ALBUMIN/GLOB SERPL: 1.1 (ref 1.1–2.2)
ALP SERPL-CCNC: 87 U/L (ref 45–117)
ALT SERPL-CCNC: 19 U/L (ref 12–78)
ANION GAP SERPL CALC-SCNC: 4 MMOL/L (ref 5–15)
APPEARANCE UR: CLEAR
AST SERPL-CCNC: 18 U/L (ref 15–37)
BACTERIA URNS QL MICRO: NEGATIVE /HPF
BASOPHILS # BLD: 0 K/UL (ref 0–0.1)
BASOPHILS NFR BLD: 1 % (ref 0–1)
BILIRUB SERPL-MCNC: 0.4 MG/DL (ref 0.2–1)
BILIRUB UR QL: NEGATIVE
BUN SERPL-MCNC: 16 MG/DL (ref 6–20)
BUN/CREAT SERPL: 19 (ref 12–20)
CALCIUM SERPL-MCNC: 9 MG/DL (ref 8.5–10.1)
CHLORIDE SERPL-SCNC: 106 MMOL/L (ref 97–108)
CO2 SERPL-SCNC: 29 MMOL/L (ref 21–32)
COLOR UR: ABNORMAL
CREAT SERPL-MCNC: 0.86 MG/DL (ref 0.55–1.02)
DIFFERENTIAL METHOD BLD: NORMAL
EOSINOPHIL # BLD: 0.1 K/UL (ref 0–0.4)
EOSINOPHIL NFR BLD: 2 % (ref 0–7)
EPITH CASTS URNS QL MICRO: ABNORMAL /LPF
ERYTHROCYTE [DISTWIDTH] IN BLOOD BY AUTOMATED COUNT: 13.4 % (ref 11.5–14.5)
GLOBULIN SER CALC-MCNC: 3.3 G/DL (ref 2–4)
GLUCOSE SERPL-MCNC: 93 MG/DL (ref 65–100)
GLUCOSE UR STRIP.AUTO-MCNC: NEGATIVE MG/DL
HCT VFR BLD AUTO: 41.2 % (ref 35–47)
HGB BLD-MCNC: 13.2 G/DL (ref 11.5–16)
HGB UR QL STRIP: ABNORMAL
IMM GRANULOCYTES # BLD AUTO: 0 K/UL (ref 0–0.04)
IMM GRANULOCYTES NFR BLD AUTO: 0 % (ref 0–0.5)
KETONES UR QL STRIP.AUTO: NEGATIVE MG/DL
LEUKOCYTE ESTERASE UR QL STRIP.AUTO: ABNORMAL
LYMPHOCYTES # BLD: 2.6 K/UL (ref 0.8–3.5)
LYMPHOCYTES NFR BLD: 37 % (ref 12–49)
MCH RBC QN AUTO: 29.5 PG (ref 26–34)
MCHC RBC AUTO-ENTMCNC: 32 G/DL (ref 30–36.5)
MCV RBC AUTO: 92 FL (ref 80–99)
MONOCYTES # BLD: 0.6 K/UL (ref 0–1)
MONOCYTES NFR BLD: 8 % (ref 5–13)
NEUTS SEG # BLD: 3.7 K/UL (ref 1.8–8)
NEUTS SEG NFR BLD: 52 % (ref 32–75)
NITRITE UR QL STRIP.AUTO: NEGATIVE
NRBC # BLD: 0 K/UL (ref 0–0.01)
NRBC BLD-RTO: 0 PER 100 WBC
PH UR STRIP: 7 (ref 5–8)
PLATELET # BLD AUTO: 216 K/UL (ref 150–400)
PMV BLD AUTO: 10.4 FL (ref 8.9–12.9)
POTASSIUM SERPL-SCNC: 3.9 MMOL/L (ref 3.5–5.1)
PROT SERPL-MCNC: 6.8 G/DL (ref 6.4–8.2)
PROT UR STRIP-MCNC: ABNORMAL MG/DL
RBC # BLD AUTO: 4.48 M/UL (ref 3.8–5.2)
RBC #/AREA URNS HPF: ABNORMAL /HPF (ref 0–5)
SODIUM SERPL-SCNC: 139 MMOL/L (ref 136–145)
SP GR UR REFRACTOMETRY: 1.02 (ref 1–1.03)
T4 FREE SERPL-MCNC: 1.2 NG/DL (ref 0.8–1.5)
TSH SERPL DL<=0.05 MIU/L-ACNC: 2.47 UIU/ML (ref 0.36–3.74)
URINE CULTURE IF INDICATED: ABNORMAL
UROBILINOGEN UR QL STRIP.AUTO: 1 EU/DL (ref 0.2–1)
VIT B12 SERPL-MCNC: 331 PG/ML (ref 193–986)
WBC # BLD AUTO: 7.1 K/UL (ref 3.6–11)
WBC URNS QL MICRO: ABNORMAL /HPF (ref 0–4)

## 2024-01-05 ENCOUNTER — TELEPHONE (OUTPATIENT)
Facility: CLINIC | Age: 79
End: 2024-01-05

## 2024-01-05 NOTE — TELEPHONE ENCOUNTER
Pt daughter called stating her mom is threatening to hurt herself/ others around her. She also is acting very out the norm.Pt daughter is very nervous/upset and what to know what she should do to help her mom.      Advised pt daughter to take her mom to [facility] to have a psych evaluation per      Pt daughter also advised to follow up with .

## 2024-01-08 NOTE — TELEPHONE ENCOUNTER
Pt daughter Alice called requesting an earlier appointment if possible due to Pt having a rapid decline down to hallucinations on a daily.     Please call: [phone]

## 2024-01-09 ENCOUNTER — OFFICE VISIT (OUTPATIENT)
Age: 79
End: 2024-01-09
Payer: MEDICARE

## 2024-01-09 VITALS
SYSTOLIC BLOOD PRESSURE: 162 MMHG | OXYGEN SATURATION: 99 % | BODY MASS INDEX: 28.78 KG/M2 | HEART RATE: 74 BPM | WEIGHT: 156.4 LBS | RESPIRATION RATE: 18 BRPM | HEIGHT: 62 IN | DIASTOLIC BLOOD PRESSURE: 106 MMHG

## 2024-01-09 DIAGNOSIS — R41.3 MEMORY LOSS: Primary | ICD-10-CM

## 2024-01-09 DIAGNOSIS — R41.840 ATTENTION DEFICIT: ICD-10-CM

## 2024-01-09 DIAGNOSIS — F03.92 HALLUCINATIONS DUE TO LATE ONSET DEMENTIA (HCC): ICD-10-CM

## 2024-01-09 DIAGNOSIS — R44.3 HALLUCINATIONS: ICD-10-CM

## 2024-01-09 DIAGNOSIS — F41.9 ANXIETY: ICD-10-CM

## 2024-01-09 PROCEDURE — 99214 OFFICE O/P EST MOD 30 MIN: CPT | Performed by: NURSE PRACTITIONER

## 2024-01-09 PROCEDURE — 1036F TOBACCO NON-USER: CPT | Performed by: NURSE PRACTITIONER

## 2024-01-09 PROCEDURE — G8427 DOCREV CUR MEDS BY ELIG CLIN: HCPCS | Performed by: NURSE PRACTITIONER

## 2024-01-09 PROCEDURE — 1123F ACP DISCUSS/DSCN MKR DOCD: CPT | Performed by: CLINICAL NEUROPSYCHOLOGIST

## 2024-01-09 PROCEDURE — 1036F TOBACCO NON-USER: CPT | Performed by: CLINICAL NEUROPSYCHOLOGIST

## 2024-01-09 PROCEDURE — 1090F PRES/ABSN URINE INCON ASSESS: CPT | Performed by: NURSE PRACTITIONER

## 2024-01-09 PROCEDURE — 90785 PSYTX COMPLEX INTERACTIVE: CPT | Performed by: CLINICAL NEUROPSYCHOLOGIST

## 2024-01-09 PROCEDURE — G8484 FLU IMMUNIZE NO ADMIN: HCPCS | Performed by: NURSE PRACTITIONER

## 2024-01-09 PROCEDURE — 90791 PSYCH DIAGNOSTIC EVALUATION: CPT | Performed by: CLINICAL NEUROPSYCHOLOGIST

## 2024-01-09 PROCEDURE — G8399 PT W/DXA RESULTS DOCUMENT: HCPCS | Performed by: NURSE PRACTITIONER

## 2024-01-09 PROCEDURE — G8419 CALC BMI OUT NRM PARAM NOF/U: HCPCS | Performed by: NURSE PRACTITIONER

## 2024-01-09 PROCEDURE — 1123F ACP DISCUSS/DSCN MKR DOCD: CPT | Performed by: NURSE PRACTITIONER

## 2024-01-09 RX ORDER — QUETIAPINE FUMARATE 25 MG/1
25 TABLET, FILM COATED ORAL
COMMUNITY

## 2024-01-09 RX ORDER — MEMANTINE HYDROCHLORIDE 5 MG/1
5 TABLET ORAL 2 TIMES DAILY
Qty: 60 TABLET | Refills: 5 | Status: SHIPPED | OUTPATIENT
Start: 2024-01-09

## 2024-01-09 ASSESSMENT — PATIENT HEALTH QUESTIONNAIRE - PHQ9
SUM OF ALL RESPONSES TO PHQ QUESTIONS 1-9: 0
SUM OF ALL RESPONSES TO PHQ9 QUESTIONS 1 & 2: 0
SUM OF ALL RESPONSES TO PHQ QUESTIONS 1-9: 0
1. LITTLE INTEREST OR PLEASURE IN DOING THINGS: 0
2. FEELING DOWN, DEPRESSED OR HOPELESS: 0
SUM OF ALL RESPONSES TO PHQ QUESTIONS 1-9: 0
SUM OF ALL RESPONSES TO PHQ QUESTIONS 1-9: 0

## 2024-01-09 ASSESSMENT — ENCOUNTER SYMPTOMS: TROUBLE SWALLOWING: 0

## 2024-01-09 NOTE — PROGRESS NOTES
[facility]  [address]  [address]  [place] [address]  Tel: [phone]  Fax: [phone]      Date:  24     Name:  ROBERT DAVIS  :  1945  MRN:  [id number]     PCP:  SUZIE Robbins MD    Chief Complaint   Patient presents with    Memory Loss     Follow up -  rapid decline and is having hallucinations daily.  From people breaking in to having a dead body in her bed.   Memory has worsened - loses her train of thought mid sentence   Saw Dr Godfrey today   Up all night last night-       HISTORY OF PRESENT ILLNESS:  Patient presents today for worsening memory.  Hallucinations started the Friday before nikko, seeing people in the house, dead bodies in her bed. It has been daily since then. Hard time getting her calmed down over the weekend.  Therefore she went to the ER at [facility], no infection noted.  Seroquel was rx'ed and seems to be helping.  It does help her go to sleep easier. No side effects noted. She is so worried about going to a nursing home.   She does wander around the house, no wandering outside of the house. Cameras in the house and alarm on the house. Luckily with the addition of the Seroquel she has stayed in bed for the last 2 nights.  She gets nervous, trouble with reading the clock and telling what day it is.   No recent driving.  Patient enjoys different puzzles such as Mahjong and Suduko.  PCP Dr robbins.  Lives alone, has a safety button in case she falls.. Daughter lives about 2 minutes ago. No recent falls.  Aricept: started at the same time as anxiety meds, had passed out a couple of times. So they stopped it.  Namenda 5mg: taking it in the morning and tolerating it well.  Dr Godfrey Neuropsych appt today, testing next week.  Weight is maintained, eats a lot of sweets. Hard time completing tasks with cooking, family helps with meals.  Pt dresses self and performs hygiene.    Recap from last visit : 1. Memory loss  Assessment & Plan:

## 2024-01-09 NOTE — PROGRESS NOTES
Intake Note      Patient Name: Mayra Wagner  YOB: 1945    Age: 78 y.o.  Date of Intake: 1/9/2024   Education: 12 Ethnicity White   Gender: Female Referring Provider: EMELI Shen     REASON FOR REFERRAL AND EVALUATION PROCEDURES:  Mayra Wagner  was referred for evaluation by her Neurology Provider to assist in differential diagnosis and individualized treatment planning. she understood the rationale and procedures for evaluation, as well as the limits to confidentiality, and agreed to participate. she consented to have this report made available to her  treating providers through her  electronic medical records.   History Sources: Patient, Relative (daughter), and Medical Record    HISTORY OF PRESENT ILLNESS:  The patient is a 78-year-old female with pertinent medical history noted for gait difficulty, Sjogren's syndrome, hypothyroid, urinary frequency, pure hypercholesterolemia, irritable bowel syndrome with constipation, anxiety, altered mental state, late onset Alzheimer's disease without behavioral disturbance, acute alteration in mental status, cerebral microvascular disease, memory loss, and vitamin D deficiency.  She presented for clinical interview accompanied by her daughter.  The patient displayed varying levels of insight and consequently, collateral history was also obtained from the patient's daughter.  According to the patient's daughter, a little over a year ago, family started noticing changes in the patient's cognitive functioning.  These were described to include attention deficits such as the patient starting tasks but not completing them and beginning conversations in the middle of a sentence or thought process.      Approximately 6 months ago, the patient experienced an acute alteration in her mental status.  This was described as greater confusion, such as the patient leaving her house with the door open and her phone inside.  The patient's family was contacted by the

## 2024-01-09 NOTE — PROGRESS NOTES
Chief Complaint   Patient presents with    Memory Loss     Follow up -  rapid decline and is having hallucinations daily.  From people breaking in to having a dead body in her bed.   Memory has worsened - loses her train of thought mid sentence   Saw Dr Godfrey today   Up all night last night-     1. Have you been to the ER, urgent care clinic since your last visit?  Hospitalized since your last visit?[facility] 1/5/23 for bad hallucination     2. Have you seen or consulted any other health care providers outside of the [facility] since your last visit?  Include any pap smears or colon screening.  Yes see above

## 2024-01-15 ENCOUNTER — PROCEDURE VISIT (OUTPATIENT)
Age: 79
End: 2024-01-15
Payer: MEDICARE

## 2024-01-15 DIAGNOSIS — G31.83 MILD LEWY BODY DEMENTIA WITH PSYCHOTIC DISTURBANCE (HCC): Primary | ICD-10-CM

## 2024-01-15 DIAGNOSIS — F02.A2 MILD LEWY BODY DEMENTIA WITH PSYCHOTIC DISTURBANCE (HCC): Primary | ICD-10-CM

## 2024-01-15 PROCEDURE — 96132 NRPSYC TST EVAL PHYS/QHP 1ST: CPT | Performed by: CLINICAL NEUROPSYCHOLOGIST

## 2024-01-15 PROCEDURE — 96138 PSYCL/NRPSYC TECH 1ST: CPT | Performed by: CLINICAL NEUROPSYCHOLOGIST

## 2024-01-15 PROCEDURE — 96133 NRPSYC TST EVAL PHYS/QHP EA: CPT | Performed by: CLINICAL NEUROPSYCHOLOGIST

## 2024-01-15 PROCEDURE — 96139 PSYCL/NRPSYC TST TECH EA: CPT | Performed by: CLINICAL NEUROPSYCHOLOGIST

## 2024-01-17 ENCOUNTER — TELEPHONE (OUTPATIENT)
Facility: CLINIC | Age: 79
End: 2024-01-17

## 2024-01-17 ENCOUNTER — TELEPHONE (OUTPATIENT)
Age: 79
End: 2024-01-17

## 2024-01-17 NOTE — TELEPHONE ENCOUNTER
Called patient's daughter, Alice Ferrer, on patient's HIPAA in order to schedule a EDFU appointment. Left vm.

## 2024-01-17 NOTE — TELEPHONE ENCOUNTER
----- Message from Lizzie Cali sent at 1/15/2024 11:45 AM EST -----  Subject: Appointment Request    Reason for Call: Established Patient Appointment needed: Routine ED Follow   Up Visit    QUESTIONS    Reason for appointment request? Available appointments did not meet   patient need     Additional Information for Provider? Pts daughterAlice requesting ER   follow up for week of 01/22. Pt has appt on 01/17, however unable to make   it in. Daughter can be reached [phone].   ---------------------------------------------------------------------------  --------------  CALL BACK INFO  [phone]; OK to leave message on voicemail  ---------------------------------------------------------------------------  --------------  SCRIPT ANSWERS

## 2024-01-18 ENCOUNTER — TELEPHONE (OUTPATIENT)
Age: 79
End: 2024-01-18

## 2024-01-18 NOTE — TELEPHONE ENCOUNTER
Received prior authorization request for patient's memantine    Attempted to process through cmm received a patient elgibility not found called patient to discuss insurance information patient unavailable left  to return my call

## 2024-01-24 PROBLEM — G31.83 MILD LEWY BODY DEMENTIA WITH PSYCHOTIC DISTURBANCE (HCC): Status: ACTIVE | Noted: 2024-01-24

## 2024-01-24 PROBLEM — F02.A2 MILD LEWY BODY DEMENTIA WITH PSYCHOTIC DISTURBANCE (HCC): Status: ACTIVE | Noted: 2024-01-24

## 2024-01-24 NOTE — PROGRESS NOTES
Neuropsychological Evaluation Report      Patient Name: Mayra Wagner  YOB: 1945    Age: 78 y.o.  Date of Intake: 2024   Education: 12 Date of Testin/15/2024   Gender: Female Ethnicity: White     Referring Provider: EMELI Shen     REASON FOR REFERRAL AND EVALUATION PROCEDURES:  Mayra Wagner  was referred for neuropsychological evaluation by her Neurology Provider to obtain a quantitative assessment of her current level of neurocognitive functioning, assist in differential diagnosis, and aid in individualized treatment planning. The patient understood the rationale and procedures for evaluation, as well as the limits to confidentiality, and they agreed to participate. The patient consented to have this report made available to her  treating providers through her  electronic medical records. This evaluation was completed with the patient by Carmelo Godfrey PsyD with the exception of testing by technician, which was completed by ROBBY Anaya under the supervision of Dr. Godfrey.  History Sources: Patient, Relative (daughter), Medical Record, and Test Data    SUMMARY AND IMPRESSION:  The following section is a summary of the patient's pertinent test results and impressions. A more thorough review of the patient's background and test scores can be found below.    Results from the patient's neuropsychological evaluation revealed moderate to advanced impairment (2-3 standard deviations below the mean) on measures of visuospatial perception/construction and visually based tests of learning and recall while verbal learning and recall were within normal limits.  Recognition/discrimination was generally within normal limits, though weakness was demonstrated on one visually based recognition test.  Moderate impairment was also demonstrated on measures of mental processing speed, working memory, and executive functioning.  Semantic verbal fluency was also significantly impaired.   Time Dose Fractionation (Optional- Include Units If Applicable): 97

## 2024-01-26 DIAGNOSIS — R41.3 MEMORY LOSS: ICD-10-CM

## 2024-01-26 RX ORDER — MEMANTINE HYDROCHLORIDE 5 MG/1
5 TABLET ORAL 2 TIMES DAILY
Qty: 180 TABLET | Refills: 1 | Status: SHIPPED | OUTPATIENT
Start: 2024-01-26

## 2024-01-26 NOTE — TELEPHONE ENCOUNTER
Received fax from [employer] requesting 90 day supply on memantine. Last OV was 1/9/24  Last filled for 30 day with 3 refills on 1/9/24

## 2024-01-29 ENCOUNTER — TELEMEDICINE (OUTPATIENT)
Age: 79
End: 2024-01-29
Payer: MEDICARE

## 2024-01-29 ENCOUNTER — TELEPHONE (OUTPATIENT)
Age: 79
End: 2024-01-29

## 2024-01-29 DIAGNOSIS — F02.A2 MILD LEWY BODY DEMENTIA WITH PSYCHOTIC DISTURBANCE (HCC): ICD-10-CM

## 2024-01-29 DIAGNOSIS — G31.83 MILD LEWY BODY DEMENTIA WITH PSYCHOTIC DISTURBANCE (HCC): ICD-10-CM

## 2024-01-29 DIAGNOSIS — R44.3 HALLUCINATIONS: Primary | ICD-10-CM

## 2024-01-29 PROCEDURE — 1123F ACP DISCUSS/DSCN MKR DOCD: CPT | Performed by: CLINICAL NEUROPSYCHOLOGIST

## 2024-01-29 PROCEDURE — 1036F TOBACCO NON-USER: CPT | Performed by: CLINICAL NEUROPSYCHOLOGIST

## 2024-01-29 PROCEDURE — 90832 PSYTX W PT 30 MINUTES: CPT | Performed by: CLINICAL NEUROPSYCHOLOGIST

## 2024-01-29 RX ORDER — VENLAFAXINE HYDROCHLORIDE 150 MG/1
CAPSULE, EXTENDED RELEASE ORAL DAILY
Qty: 90 CAPSULE | Refills: 0 | Status: SHIPPED | OUTPATIENT
Start: 2024-01-29

## 2024-01-29 NOTE — TELEPHONE ENCOUNTER
Pt called back returning Tiffanie's call to r/s her appointment that was canceled for today.     Please call: [phone]

## 2024-01-29 NOTE — TELEPHONE ENCOUNTER
Called and left message letting patient know we need to r/s the appt for 1/29 due to the provider being ooo

## 2024-01-29 NOTE — TELEPHONE ENCOUNTER
Attempted to call patient and her daughter to see r/s appt for 1/29.  Left message to call back to r/s.

## 2024-01-29 NOTE — TELEPHONE ENCOUNTER
Patients daughter would like a call back to reschedule the apptmt with . she can be reached at [phone]

## 2024-01-29 NOTE — PROGRESS NOTES
Prior to seeing the patient I reviewed pertinent records, including the previously completed report, the records in Epic, and any updated visits from other providers since the patient's last visit.    Today, I engaged in a psychoeducational and supportive feedback session with the patient.  I provided psychotherapy in the form of psychoeducation and support with respect to the results of the recent Neuropsychological Evaluation, including discussing individual tests as well as patient's areas of neurocognitive strength versus weakness.    We discussed, in detail, the following:  Reviewed findings from evaluation including scores, diagnosis, and suspected contributing factors  Discussed recommendations outlined in report  Answered questions to the best of my ability    Education was provided regarding my diagnostic impressions, and we discussed treatment plan/options.   I also answered numerous questions related to the clinical findings, including discussing various methods to improve cognition and mood.  Supportive/Cognitive Behavioral/Solution Focused psychotherapy provided.     The patient needs to:   Follow-up with neurology for further workup if indicated  Start discussing plans for next stages of care with family  Emphasize modifiable protective behaviors for cognitive functioning such as exercise, diet, and cognitive stimulation  Engage community resources as we discussed      TIME:  Psychotherapy: 33 min    BILLING:  [id number]

## 2024-01-29 NOTE — TELEPHONE ENCOUNTER
Last Refill: 11-6-23  Last Visit: 1/3/2024   Next Visit: 2/26/2024     Requested Prescriptions     Pending Prescriptions Disp Refills    venlafaxine (EFFEXOR XR) 150 MG extended release capsule [Pharmacy Med Name: VENLAFAXINE HCL  MG CAP] 90 capsule 0     Sig: TAKE 1 CAPSULE BY MOUTH EVERY DAY

## 2024-01-30 ENCOUNTER — TELEPHONE (OUTPATIENT)
Age: 79
End: 2024-01-30

## 2024-02-01 RX ORDER — QUETIAPINE FUMARATE 25 MG/1
25 TABLET, FILM COATED ORAL
Qty: 30 TABLET | Refills: 0 | Status: SHIPPED | OUTPATIENT
Start: 2024-02-01

## 2024-02-01 NOTE — TELEPHONE ENCOUNTER
Last Refill: ?  Last Visit: 1/3/2024   Next Visit: 2/26/2024     Requested Prescriptions     Pending Prescriptions Disp Refills    QUEtiapine (SEROQUEL) 25 MG tablet 30 tablet 0     Sig: Take 1 tablet by mouth nightly

## 2024-02-01 NOTE — TELEPHONE ENCOUNTER
----- Message from Julia Torres sent at 2/1/2024  9:35 AM EST -----  Subject: Refill Request    QUESTIONS  Name of Medication? QUEtiapine (SEROQUEL) 25 MG tablet  Patient-reported dosage and instructions? once every night  How many days do you have left? 3  Preferred Pharmacy? [id number]  Pharmacy phone number (if available)? [phone]  Additional Information for Provider? please call david Jenkins (she is on   the HIPPA form)  ---------------------------------------------------------------------------  --------------  CALL BACK INFO  What is the best way for the office to contact you? OK to leave message on   voicemail  Preferred Call Back Phone Number? [phone]  ---------------------------------------------------------------------------  --------------  SCRIPT ANSWERS  Relationship to Patient? Other/Third Party  Representative Name? david Jenkins  Is the representative on the Communication Release of Information (CHELI)   form in Epic? Yes   3

## 2024-02-20 ENCOUNTER — TELEPHONE (OUTPATIENT)
Age: 79
End: 2024-02-20

## 2024-02-20 NOTE — TELEPHONE ENCOUNTER
Returned call to patient's daughter to inform that a copy of the patient's report has been left at the  for her to . Mailbox is full, unable to LM.

## 2024-02-20 NOTE — TELEPHONE ENCOUNTER
Patient's daughter, Alice, called stating that they still have not received any of the paperwork in the mail (report/AVS) and asked if she could please  a copy. Please advise. [phone]

## 2024-02-26 NOTE — TELEPHONE ENCOUNTER
QUEtiapine (SEROQUEL) 25 MG tablet 90 tablet 0 2/1/2024 --    Sig - Route: Take 1 tablet by mouth nightly - Oral      Last visit 1/3/24  Future appt 3/6/24    Pharmacy [facility]

## 2024-02-27 RX ORDER — QUETIAPINE FUMARATE 25 MG/1
25 TABLET, FILM COATED ORAL
Qty: 90 TABLET | Refills: 1 | Status: SHIPPED | OUTPATIENT
Start: 2024-02-27

## 2024-03-06 ENCOUNTER — OFFICE VISIT (OUTPATIENT)
Facility: CLINIC | Age: 79
End: 2024-03-06
Payer: MEDICARE

## 2024-03-06 VITALS
OXYGEN SATURATION: 99 % | TEMPERATURE: 98.5 F | DIASTOLIC BLOOD PRESSURE: 70 MMHG | WEIGHT: 158.8 LBS | HEIGHT: 62 IN | SYSTOLIC BLOOD PRESSURE: 120 MMHG | HEART RATE: 80 BPM | BODY MASS INDEX: 29.22 KG/M2 | RESPIRATION RATE: 16 BRPM

## 2024-03-06 DIAGNOSIS — G31.83 MILD LEWY BODY DEMENTIA WITH PSYCHOTIC DISTURBANCE (HCC): Primary | ICD-10-CM

## 2024-03-06 DIAGNOSIS — Z00.00 MEDICARE ANNUAL WELLNESS VISIT, SUBSEQUENT: ICD-10-CM

## 2024-03-06 DIAGNOSIS — F02.A2 MILD LEWY BODY DEMENTIA WITH PSYCHOTIC DISTURBANCE (HCC): Primary | ICD-10-CM

## 2024-03-06 DIAGNOSIS — E55.9 VITAMIN D DEFICIENCY: ICD-10-CM

## 2024-03-06 DIAGNOSIS — E03.4 HYPOTHYROIDISM DUE TO ACQUIRED ATROPHY OF THYROID: ICD-10-CM

## 2024-03-06 PROBLEM — G30.1 LATE ONSET ALZHEIMER'S DISEASE WITHOUT BEHAVIORAL DISTURBANCE (HCC): Status: RESOLVED | Noted: 2023-08-28 | Resolved: 2024-03-06

## 2024-03-06 PROBLEM — F02.80 LATE ONSET ALZHEIMER'S DISEASE WITHOUT BEHAVIORAL DISTURBANCE (HCC): Status: RESOLVED | Noted: 2023-08-28 | Resolved: 2024-03-06

## 2024-03-06 PROCEDURE — 1123F ACP DISCUSS/DSCN MKR DOCD: CPT | Performed by: INTERNAL MEDICINE

## 2024-03-06 PROCEDURE — G0439 PPPS, SUBSEQ VISIT: HCPCS | Performed by: INTERNAL MEDICINE

## 2024-03-06 PROCEDURE — G8484 FLU IMMUNIZE NO ADMIN: HCPCS | Performed by: INTERNAL MEDICINE

## 2024-03-06 RX ORDER — QUETIAPINE FUMARATE 25 MG/1
25 TABLET, FILM COATED ORAL
Qty: 90 TABLET | Refills: 1 | Status: SHIPPED | OUTPATIENT
Start: 2024-03-06

## 2024-03-06 ASSESSMENT — PATIENT HEALTH QUESTIONNAIRE - PHQ9
SUM OF ALL RESPONSES TO PHQ QUESTIONS 1-9: 0
SUM OF ALL RESPONSES TO PHQ9 QUESTIONS 1 & 2: 0
SUM OF ALL RESPONSES TO PHQ QUESTIONS 1-9: 0
SUM OF ALL RESPONSES TO PHQ QUESTIONS 1-9: 0
2. FEELING DOWN, DEPRESSED OR HOPELESS: 0
SUM OF ALL RESPONSES TO PHQ QUESTIONS 1-9: 0
1. LITTLE INTEREST OR PLEASURE IN DOING THINGS: 0

## 2024-03-06 NOTE — PROGRESS NOTES
Mayra Wagner is a 78 y.o. female presenting for 6 Month Follow-Up and Medicare AWV  .     \"Have you been to the ER, urgent care clinic since your last visit?  Hospitalized since your last visit?\"    1-5-24 ER   Altered Mental Status     Hallucinations        “Have you seen or consulted any other health care providers outside of [facility] since your last visit?”    NO                                   
her symptoms progress would consider switching to Wellbutrin.  She has been taking prescription vitamin D2 and her vitamin D level is above 60 so I have now recommended she switch to vitamin D3 over-the-counter 2000 units daily.  She will continue her current dose of levothyroxine for hypothyroidism.  She will continue Seroquel nightly for lower body dementia with hallucinations.  Her daughter is handling her financial affairs.  It appears that she will be making a transition to assisted living with memory care in the near future.      I have reviewed with the patient details of the assessment and plan and all questions were answered. Relevent patient education was performed. Verbal and/or written instructions (see AVS) provided. The most recent lab findings were reviewed with the patient.  Plan was discussed with patient who verbally expressed understanding.    An After Visit Summary was printed and given to the patient.    Rinku Ty MD

## 2024-03-14 ENCOUNTER — OFFICE VISIT (OUTPATIENT)
Age: 79
End: 2024-03-14
Payer: MEDICARE

## 2024-03-14 VITALS
WEIGHT: 157 LBS | HEIGHT: 62 IN | BODY MASS INDEX: 28.89 KG/M2 | OXYGEN SATURATION: 98 % | DIASTOLIC BLOOD PRESSURE: 92 MMHG | HEART RATE: 83 BPM | SYSTOLIC BLOOD PRESSURE: 138 MMHG | RESPIRATION RATE: 15 BRPM

## 2024-03-14 DIAGNOSIS — F41.9 ANXIETY: ICD-10-CM

## 2024-03-14 DIAGNOSIS — F02.A2 MILD LEWY BODY DEMENTIA WITH PSYCHOTIC DISTURBANCE (HCC): Primary | ICD-10-CM

## 2024-03-14 DIAGNOSIS — G25.81 RLS (RESTLESS LEGS SYNDROME): ICD-10-CM

## 2024-03-14 DIAGNOSIS — E55.9 VITAMIN D DEFICIENCY: ICD-10-CM

## 2024-03-14 DIAGNOSIS — G47.52 REM SLEEP BEHAVIOR DISORDER: ICD-10-CM

## 2024-03-14 DIAGNOSIS — G20.C ATYPICAL PARKINSONISM: ICD-10-CM

## 2024-03-14 DIAGNOSIS — G31.83 MILD LEWY BODY DEMENTIA WITH PSYCHOTIC DISTURBANCE (HCC): Primary | ICD-10-CM

## 2024-03-14 PROCEDURE — G8427 DOCREV CUR MEDS BY ELIG CLIN: HCPCS

## 2024-03-14 PROCEDURE — 99215 OFFICE O/P EST HI 40 MIN: CPT

## 2024-03-14 PROCEDURE — G8484 FLU IMMUNIZE NO ADMIN: HCPCS

## 2024-03-14 PROCEDURE — G8399 PT W/DXA RESULTS DOCUMENT: HCPCS

## 2024-03-14 PROCEDURE — 1090F PRES/ABSN URINE INCON ASSESS: CPT

## 2024-03-14 PROCEDURE — 1123F ACP DISCUSS/DSCN MKR DOCD: CPT

## 2024-03-14 PROCEDURE — 1036F TOBACCO NON-USER: CPT

## 2024-03-14 PROCEDURE — G8419 CALC BMI OUT NRM PARAM NOF/U: HCPCS

## 2024-03-14 RX ORDER — MEMANTINE HYDROCHLORIDE 5 MG/1
10 TABLET ORAL 2 TIMES DAILY
Qty: 180 TABLET | Refills: 1 | Status: SHIPPED | OUTPATIENT
Start: 2024-03-14

## 2024-03-14 RX ORDER — QUETIAPINE FUMARATE 25 MG/1
50 TABLET, FILM COATED ORAL
Qty: 60 TABLET | Refills: 5 | Status: SHIPPED | OUTPATIENT
Start: 2024-03-14

## 2024-03-14 ASSESSMENT — ENCOUNTER SYMPTOMS
RESPIRATORY NEGATIVE: 1
ALLERGIC/IMMUNOLOGIC NEGATIVE: 1
EYES NEGATIVE: 1
GASTROINTESTINAL NEGATIVE: 1

## 2024-03-14 ASSESSMENT — PATIENT HEALTH QUESTIONNAIRE - PHQ9
SUM OF ALL RESPONSES TO PHQ QUESTIONS 1-9: 0
SUM OF ALL RESPONSES TO PHQ QUESTIONS 1-9: 0
SUM OF ALL RESPONSES TO PHQ9 QUESTIONS 1 & 2: 0
1. LITTLE INTEREST OR PLEASURE IN DOING THINGS: 0
2. FEELING DOWN, DEPRESSED OR HOPELESS: 0
SUM OF ALL RESPONSES TO PHQ QUESTIONS 1-9: 0
SUM OF ALL RESPONSES TO PHQ QUESTIONS 1-9: 0

## 2024-03-14 NOTE — ASSESSMENT & PLAN NOTE
Stable    She is to continue to take venlafaxine XR 75 mg daily.    Will defer management to her primary care provider.    She is to continue to follow up with PCP as appropriate.

## 2024-03-14 NOTE — ASSESSMENT & PLAN NOTE
Supported by neuropsych testing with Dr. Godfrey on 1/15/2024    Will increase memantine to 10 mg twice a day and prescription was sent to her pharmacy.    She continues to experience hallucinations also.  Not as bad as they used to be.  Will also increase Seroquel to 50 mg at bedtime to see if that helps.  Side effects were reviewed.  If patient starts to experience side effects from increased dose, she is to decrease dosage to 25 mg.    It was recommended patient has a DaTscan for atypical parkinsonism and a sleep study for REM sleep behavior disorders.  Will obtain a DaTscan to rule out parkinson syndrome and will refer patient for a sleep study to rule out any other etiology of her symptoms.    Sleep hygiene was discussed to patient.    We discussed treatment options in detail including risks/benefits and expectations.  While medication is not likely to made a drastic difference, it may aid modestly in improving concentration and attention.    - Medication titration and addition of adjunctive agents may be necessary to achieve maximum benefit.    - Explained that dementia is a progressive disease process.    - Discussed that he would benefit from designation of a POA to assist with executive decision making.  Finances and medication administration should be monitored to ensure accuracy and prevent errors.     Patient was encouraged to engage in approximately 2.5 hours of physician approved physical activity on a weekly basis.    Patient was encouraged to maintain a healthy diet. they will also be informed of the Mediterranean diet, which has been associated with reduced risk for neurodegenerative conditions as well as heart disease.    Patient was encouraged to maintain a cognitively stimulated lifestyle, also incorporating social interaction.

## 2024-03-14 NOTE — ASSESSMENT & PLAN NOTE
On supplementation    This has been managed by her PCP. She is to continue to follow-up with PCP as appropriate.

## 2024-03-14 NOTE — PROGRESS NOTES
1. Have you been to the ER, urgent care clinic since your last visit?  Hospitalized since your last visit?  No.    2. Have you seen or consulted any other health care providers outside of the [facility] since your last visit?  Include any pap smears or colon screening.   No.      Chief Complaint   Patient presents with    Memory Loss     Results from - no new symptoms       
maintain a healthy diet. they will also be informed of the Mediterranean diet, which has been associated with reduced risk for neurodegenerative conditions as well as heart disease.    Patient was encouraged to maintain a cognitively stimulated lifestyle, also incorporating social interaction.  Orders:  -     memantine (NAMENDA) 5 MG tablet; Take 2 tablets by mouth 2 times daily, Disp-180 tablet, R-1Normal  -     QUEtiapine (SEROQUEL) 25 MG tablet; Take 2 tablets by mouth nightly, Disp-60 tablet, R-5Normal  2. Atypical parkinsonism  Assessment & Plan:   Her current neurological examination did not reveal any bradykinesia, resting tremor, cogwheel rigidity to suggest parkinsonism    Given she has overlapping symptoms that may be seen in atypical parkinsonism and in Lewy body dementia, will obtain a DaTscan to evaluate for parkinsonian syndrome.    Recent brain MRI without contrast that was completed on 8/27/2023 did not show any acute intracranial abnormality.  It did show scattered periventricular and subcortical white matter signal abnormalities are consistent with chronic small vessel ischemic disease.    Will continue to treat patient for dementia and will make recommendations based on what the DaTscan shows.  Orders:  -     [facility] BRAIN SPECT; Future  3. REM sleep behavior disorder  Assessment & Plan:  Patient had neuropsych evaluation completed recently and was recommended to obtain a sleep study to rule out any REM sleep behavior disorder .    She verbalized she has adequate sleep but keeps moving throughout the night.    Will refer patient to Dr. Benz for sleep study.    Sleep hygiene was discussed to patient.  Orders:  -     [facility] - Mitra Benz MD, Sleep Medicine, [place]  4. RLS (restless legs syndrome)  Assessment & Plan:   Stable    Well managed on pramipexole 0.5 mg at bedtime.    No intervention needed at this time.  Patient is to continue to take pramipexole 0.5 mg as prescribed.  5.

## 2024-03-14 NOTE — ASSESSMENT & PLAN NOTE
Stable    Well managed on pramipexole 0.5 mg at bedtime.    No intervention needed at this time.  Patient is to continue to take pramipexole 0.5 mg as prescribed.

## 2024-03-14 NOTE — ASSESSMENT & PLAN NOTE
Her current neurological examination did not reveal any bradykinesia, resting tremor, cogwheel rigidity to suggest parkinsonism    Given she has overlapping symptoms that may be seen in atypical parkinsonism and in Lewy body dementia, will obtain a DaTscan to evaluate for parkinsonian syndrome.    Recent brain MRI without contrast that was completed on 8/27/2023 did not show any acute intracranial abnormality.  It did show scattered periventricular and subcortical white matter signal abnormalities are consistent with chronic small vessel ischemic disease.    Will continue to treat patient for dementia and will make recommendations based on what the DaTscan shows.

## 2024-03-14 NOTE — PATIENT INSTRUCTIONS
As per our discussion,    Will increase to Namenda to 10 mg twice a day.  I will still send prescription for 5 mg in which she can take 2 tablets twice a day but if for some reason you start experiencing side effects, I would advise that you go back to 5 mg twice a day.    I will slightly increase your Seroquel to 50 mg for hallucinations.  If you start experiencing side effects, go back to 25 mg nightly.    Per Dr. Godfrey's recommendation, it was advised that you obtain a sleep study for REM sleep behavior.  Will refer you to Dr. Benz for a sleep study.  Someone will call you to set up the appointment.  The office is located on the second floor.    He was also recommended that you obtain a DaTscan to look for any atypical parkinsonism.  Will place this order and someone would help you schedule.    As we discussed treatment options in detail including risks/benefits and expectations.  While medication is not likely to made a drastic difference, it may aid modestly in improving concentration and attention.    - Medication titration and addition of adjunctive agents may be necessary to achieve maximum benefit.    - Explained that dementia is a progressive disease process.    - Discussed that he would benefit from designation of a POA to assist with executive decision making.  Finances and medication administration should be monitored to ensure accuracy and prevent errors.     I encouraged you to engage in approximately 2.5 hours of physician approved physical activity on a weekly basis.    To maintain a healthy diet. they will also be informed of the Mediterranean diet, which has been associated with reduced risk for neurodegenerative conditions as well as heart disease.    To maintain a cognitively stimulated lifestyle, also incorporating social interaction.    It was a pleasure to see you and your 2 daughters today    Will see you back in 6 months or sooner if needed    Please do not hesitate to reach out for any

## 2024-03-14 NOTE — ASSESSMENT & PLAN NOTE
Patient had neuropsych evaluation completed recently and was recommended to obtain a sleep study to rule out any REM sleep behavior disorder .    She verbalized she has adequate sleep but keeps moving throughout the night.    Will refer patient to Dr. Benz for sleep study.    Sleep hygiene was discussed to patient.

## 2024-03-19 ENCOUNTER — TELEPHONE (OUTPATIENT)
Age: 79
End: 2024-03-19

## 2024-03-19 NOTE — TELEPHONE ENCOUNTER
Phoned the patient to schedule a sleep consult per Laura Rizo APRN - NP.  VM full, unable to leave a message.

## 2024-04-24 RX ORDER — VENLAFAXINE HYDROCHLORIDE 150 MG/1
CAPSULE, EXTENDED RELEASE ORAL DAILY
Qty: 90 CAPSULE | Refills: 0 | Status: SHIPPED | OUTPATIENT
Start: 2024-04-24

## 2024-04-24 NOTE — TELEPHONE ENCOUNTER
RX refill request from the patient/pharmacy. Patient last seen 03- with labs, and does not have a f/up appointment.  Requested Prescriptions     Pending Prescriptions Disp Refills    venlafaxine (EFFEXOR XR) 150 MG extended release capsule [Pharmacy Med Name: VENLAFAXINE HCL  MG CAP] 90 capsule 0     Sig: TAKE 1 CAPSULE BY MOUTH EVERY DAY

## 2024-04-29 DIAGNOSIS — G31.83 MILD LEWY BODY DEMENTIA WITH PSYCHOTIC DISTURBANCE (HCC): ICD-10-CM

## 2024-04-29 DIAGNOSIS — F02.A2 MILD LEWY BODY DEMENTIA WITH PSYCHOTIC DISTURBANCE (HCC): ICD-10-CM

## 2024-04-29 RX ORDER — MEMANTINE HYDROCHLORIDE 5 MG/1
10 TABLET ORAL 2 TIMES DAILY
Qty: 360 TABLET | Refills: 0 | Status: SHIPPED | OUTPATIENT
Start: 2024-04-29

## 2024-04-30 ENCOUNTER — CLINICAL DOCUMENTATION (OUTPATIENT)
Age: 79
End: 2024-04-30

## 2024-04-30 NOTE — PROGRESS NOTES
Received fax from [facility] for refill on donepezil. Faxed note back to [facility] stating no longer on this medication. Please D/C this , thanks.  Faxed to [phone] and received fax confirmation.

## 2024-07-17 RX ORDER — VENLAFAXINE HYDROCHLORIDE 150 MG/1
150 CAPSULE, EXTENDED RELEASE ORAL DAILY
Qty: 90 CAPSULE | Refills: 1 | Status: SHIPPED | OUTPATIENT
Start: 2024-07-17

## 2024-07-17 NOTE — TELEPHONE ENCOUNTER
venlafaxine (EFFEXOR XR) 150 MG extended release capsule 90 capsule 0 4/24/2024 --    Sig - Route: TAKE 1 CAPSULE BY MOUTH EVERY DAY - Oral      Last visit 3/6/24  No future appt    Pharmacy [place], [place]    Patient is with her daughter until Aug. 16, 2024

## 2024-07-17 NOTE — TELEPHONE ENCOUNTER
PCP: SUZIE Ty MD    Last appt: 3/6/2024  No future appointments.    Requested Prescriptions     Pending Prescriptions Disp Refills    venlafaxine (EFFEXOR XR) 150 MG extended release capsule 90 capsule 0     Sig: Take 1 capsule by mouth daily       Prior labs and Blood pressures:  BP Readings from Last 3 Encounters:   03/14/24 (!) 138/92   03/06/24 120/70   01/09/24 (!) 162/106     Lab Results   Component Value Date/Time     01/03/2024 12:33 PM    K 3.9 01/03/2024 12:33 PM     01/03/2024 12:33 PM    CO2 29 01/03/2024 12:33 PM    BUN 16 01/03/2024 12:33 PM    GFRAA >60 08/22/2022 07:28 AM     No results found for: \"HBA1C\", \"WGC8PXVT\"  Lab Results   Component Value Date/Time    CHOL 238 08/28/2023 04:55 AM    HDL 58 08/28/2023 04:55 AM    .8 08/28/2023 04:55 AM    VLDL 22.2 08/28/2023 04:55 AM    VLDL 23 02/05/2021 11:50 AM     No results found for: \"VITD3\"        Lab Results   Component Value Date/Time    TSH 2.47 01/03/2024 12:33 PM

## 2024-07-22 ENCOUNTER — PATIENT MESSAGE (OUTPATIENT)
Facility: CLINIC | Age: 79
End: 2024-07-22

## 2024-08-28 ENCOUNTER — APPOINTMENT (OUTPATIENT)
Facility: HOSPITAL | Age: 79
End: 2024-08-28
Payer: MEDICARE

## 2024-08-28 ENCOUNTER — HOSPITAL ENCOUNTER (EMERGENCY)
Facility: HOSPITAL | Age: 79
Discharge: HOME | End: 2024-08-28
Attending: EMERGENCY MEDICINE
Payer: MEDICARE

## 2024-08-28 VITALS
TEMPERATURE: 98.4 F | SYSTOLIC BLOOD PRESSURE: 208 MMHG | RESPIRATION RATE: 14 BRPM | OXYGEN SATURATION: 98 % | DIASTOLIC BLOOD PRESSURE: 96 MMHG | HEART RATE: 74 BPM

## 2024-08-28 DIAGNOSIS — R03.0 ELEVATED BLOOD PRESSURE READING: ICD-10-CM

## 2024-08-28 DIAGNOSIS — S01.81XA FACIAL LACERATION, INITIAL ENCOUNTER: ICD-10-CM

## 2024-08-28 DIAGNOSIS — S09.90XA CLOSED HEAD INJURY, INITIAL ENCOUNTER: Primary | ICD-10-CM

## 2024-08-28 DIAGNOSIS — S02.2XXA CLOSED FRACTURE OF NASAL BONE, INITIAL ENCOUNTER: ICD-10-CM

## 2024-08-28 LAB
ANION GAP SERPL CALC-SCNC: 11 MMOL/L (ref 5–15)
APPEARANCE UR: CLEAR
BACTERIA URNS QL MICRO: NEGATIVE /HPF
BASOPHILS # BLD: 0 K/UL (ref 0–0.1)
BASOPHILS NFR BLD: 0 % (ref 0–1)
BILIRUB UR QL: NEGATIVE
BUN SERPL-MCNC: 14 MG/DL (ref 6–20)
BUN/CREAT SERPL: 17 (ref 12–20)
CALCIUM SERPL-MCNC: 8.4 MG/DL (ref 8.5–10.1)
CHLORIDE SERPL-SCNC: 108 MMOL/L (ref 97–108)
CO2 SERPL-SCNC: 23 MMOL/L (ref 21–32)
COLOR UR: ABNORMAL
CREAT SERPL-MCNC: 0.83 MG/DL (ref 0.55–1.02)
DIFFERENTIAL METHOD BLD: NORMAL
EOSINOPHIL # BLD: 0 K/UL (ref 0–0.4)
EOSINOPHIL NFR BLD: 0 % (ref 0–7)
EPITH CASTS URNS QL MICRO: ABNORMAL /LPF
ERYTHROCYTE [DISTWIDTH] IN BLOOD BY AUTOMATED COUNT: 12.1 % (ref 11.5–14.5)
GLUCOSE SERPL-MCNC: 108 MG/DL (ref 65–100)
GLUCOSE UR STRIP.AUTO-MCNC: NEGATIVE MG/DL
HCT VFR BLD AUTO: 36.4 % (ref 35–47)
HGB BLD-MCNC: 12.4 G/DL (ref 11.5–16)
HGB UR QL STRIP: ABNORMAL
HYALINE CASTS URNS QL MICRO: ABNORMAL /LPF (ref 0–2)
IMM GRANULOCYTES # BLD AUTO: 0 K/UL (ref 0–0.04)
IMM GRANULOCYTES NFR BLD AUTO: 0 % (ref 0–0.5)
KETONES UR QL STRIP.AUTO: NEGATIVE MG/DL
LEUKOCYTE ESTERASE UR QL STRIP.AUTO: NEGATIVE
LYMPHOCYTES # BLD: 1.3 K/UL (ref 0.8–3.5)
LYMPHOCYTES NFR BLD: 21 % (ref 12–49)
MAGNESIUM SERPL-MCNC: 2 MG/DL (ref 1.6–2.4)
MCH RBC QN AUTO: 31 PG (ref 26–34)
MCHC RBC AUTO-ENTMCNC: 34.1 G/DL (ref 30–36.5)
MCV RBC AUTO: 91 FL (ref 80–99)
MONOCYTES # BLD: 0.6 K/UL (ref 0–1)
MONOCYTES NFR BLD: 9 % (ref 5–13)
NEUTS SEG # BLD: 4.3 K/UL (ref 1.8–8)
NEUTS SEG NFR BLD: 70 % (ref 32–75)
NITRITE UR QL STRIP.AUTO: NEGATIVE
NRBC # BLD: 0 K/UL (ref 0–0.01)
NRBC BLD-RTO: 0 PER 100 WBC
PH UR STRIP: 6.5 (ref 5–8)
PLATELET # BLD AUTO: 179 K/UL (ref 150–400)
PMV BLD AUTO: 9.3 FL (ref 8.9–12.9)
POTASSIUM SERPL-SCNC: 3.8 MMOL/L (ref 3.5–5.1)
PROT UR STRIP-MCNC: NEGATIVE MG/DL
RBC # BLD AUTO: 4 M/UL (ref 3.8–5.2)
RBC #/AREA URNS HPF: ABNORMAL /HPF (ref 0–5)
SODIUM SERPL-SCNC: 142 MMOL/L (ref 136–145)
SP GR UR REFRACTOMETRY: 1.01
URINE CULTURE IF INDICATED: ABNORMAL
UROBILINOGEN UR QL STRIP.AUTO: 0.2 EU/DL (ref 0.2–1)
WBC # BLD AUTO: 6.2 K/UL (ref 3.6–11)
WBC URNS QL MICRO: ABNORMAL /HPF (ref 0–4)

## 2024-08-28 PROCEDURE — 36415 COLL VENOUS BLD VENIPUNCTURE: CPT

## 2024-08-28 PROCEDURE — 83735 ASSAY OF MAGNESIUM: CPT

## 2024-08-28 PROCEDURE — 81001 URINALYSIS AUTO W/SCOPE: CPT

## 2024-08-28 PROCEDURE — 80048 BASIC METABOLIC PNL TOTAL CA: CPT

## 2024-08-28 PROCEDURE — 90471 IMMUNIZATION ADMIN: CPT | Performed by: EMERGENCY MEDICINE

## 2024-08-28 PROCEDURE — 6360000002 HC RX W HCPCS: Performed by: EMERGENCY MEDICINE

## 2024-08-28 PROCEDURE — 70450 CT HEAD/BRAIN W/O DYE: CPT

## 2024-08-28 PROCEDURE — 99284 EMERGENCY DEPT VISIT MOD MDM: CPT

## 2024-08-28 PROCEDURE — 72125 CT NECK SPINE W/O DYE: CPT

## 2024-08-28 PROCEDURE — 12011 RPR F/E/E/N/L/M 2.5 CM/<: CPT

## 2024-08-28 PROCEDURE — 90714 TD VACC NO PRESV 7 YRS+ IM: CPT | Performed by: EMERGENCY MEDICINE

## 2024-08-28 PROCEDURE — 73502 X-RAY EXAM HIP UNI 2-3 VIEWS: CPT

## 2024-08-28 PROCEDURE — 73552 X-RAY EXAM OF FEMUR 2/>: CPT

## 2024-08-28 PROCEDURE — 85025 COMPLETE CBC W/AUTO DIFF WBC: CPT

## 2024-08-28 PROCEDURE — 2580000003 HC RX 258: Performed by: EMERGENCY MEDICINE

## 2024-08-28 PROCEDURE — 70486 CT MAXILLOFACIAL W/O DYE: CPT

## 2024-08-28 PROCEDURE — 6370000000 HC RX 637 (ALT 250 FOR IP): Performed by: EMERGENCY MEDICINE

## 2024-08-28 RX ORDER — AMLODIPINE BESYLATE 2.5 MG/1
2.5 TABLET ORAL DAILY
Qty: 30 TABLET | Refills: 0 | Status: SHIPPED | OUTPATIENT
Start: 2024-08-29

## 2024-08-28 RX ORDER — AMLODIPINE BESYLATE 5 MG/1
5 TABLET ORAL
Status: COMPLETED | OUTPATIENT
Start: 2024-08-28 | End: 2024-08-28

## 2024-08-28 RX ORDER — 0.9 % SODIUM CHLORIDE 0.9 %
500 INTRAVENOUS SOLUTION INTRAVENOUS ONCE
Status: COMPLETED | OUTPATIENT
Start: 2024-08-28 | End: 2024-08-28

## 2024-08-28 RX ADMIN — SODIUM CHLORIDE 500 ML: 900 INJECTION, SOLUTION INTRAVENOUS at 11:21

## 2024-08-28 RX ADMIN — AMLODIPINE BESYLATE 5 MG: 5 TABLET ORAL at 12:21

## 2024-08-28 RX ADMIN — CLOSTRIDIUM TETANI TOXOID ANTIGEN (FORMALDEHYDE INACTIVATED) AND CORYNEBACTERIUM DIPHTHERIAE TOXOID ANTIGEN (FORMALDEHYDE INACTIVATED) 0.5 ML: 5; 2 INJECTION, SUSPENSION INTRAMUSCULAR at 10:49

## 2024-08-28 ASSESSMENT — PAIN DESCRIPTION - LOCATION: LOCATION: HEAD

## 2024-08-28 ASSESSMENT — PAIN - FUNCTIONAL ASSESSMENT: PAIN_FUNCTIONAL_ASSESSMENT: 0-10

## 2024-08-28 ASSESSMENT — PAIN SCALES - GENERAL: PAINLEVEL_OUTOF10: 8

## 2024-08-28 NOTE — ED PROVIDER NOTES
BY MOUTH EVERY DAY BEFORE BREAKFAST    MEMANTINE (NAMENDA) 5 MG TABLET    Take 2 tablets by mouth 2 times daily    PRAMIPEXOLE (MIRAPEX) 0.5 MG TABLET    TAKE 1 TABLET BY MOUTH EVERY DAY AT BEDTIME FOR RESTLESS LEGS    QUETIAPINE (SEROQUEL) 25 MG TABLET    Take 2 tablets by mouth nightly    VENLAFAXINE (EFFEXOR XR) 150 MG EXTENDED RELEASE CAPSULE    Take 1 capsule by mouth daily    VITAMIN D 25 MCG (1000 UT) CAPS    Take 1 capsule by mouth daily       SCREENINGS               No data recorded         PHYSICAL EXAM      ED Triage Vitals [08/28/24 0938]   Encounter Vitals Group      BP (!) 161/87      Systolic BP Percentile       Diastolic BP Percentile       Pulse 74      Respirations 14      Temp 98.4 °F (36.9 °C)      Temp Source Oral      SpO2 94 %      Weight       Height       Head Circumference       Peak Flow       Pain Score       Pain Loc       Pain Education       Exclude from Growth Chart         Physical Exam  Vitals and nursing note reviewed.   HENT:      Head: Normocephalic.      Comments: Several abrasions on face, 1 linear laceration right mid face  Eyes:      Extraocular Movements: Extraocular movements intact.   Cardiovascular:      Rate and Rhythm: Normal rate.      Pulses: Normal pulses.      Heart sounds: Normal heart sounds.   Pulmonary:      Effort: Pulmonary effort is normal.      Breath sounds: Normal breath sounds.   Abdominal:      General: Bowel sounds are normal.   Musculoskeletal:         General: Normal range of motion.      Cervical back: Normal range of motion.   Skin:     General: Skin is warm.   Neurological:      General: No focal deficit present.      Mental Status: She is alert and oriented to person, place, and time.   Psychiatric:         Mood and Affect: Mood normal.         Behavior: Behavior normal.          DIAGNOSTIC RESULTS   LABS:     Recent Results (from the past 24 hour(s))   CBC with Auto Differential    Collection Time: 08/28/24 10:16 AM   Result Value Ref Range     /hpf    Epithelial Cells, UA FEW FEW /lpf    BACTERIA, URINE Negative NEG /hpf    Urine Culture if Indicated CULTURE NOT INDICATED BY UA RESULT CNI      Hyaline Casts, UA 0-2 0 - 2 /lpf       EKG: If performed, independent interpretation documented below in the MDM section     RADIOLOGY:  Non-plain film images such as CT, Ultrasound and MRI are read by the radiologist. Plain radiographic images are visualized and preliminarily interpreted by the ED Provider with the findings documented in the MDM section.     Interpretation per the Radiologist below, if available at the time of this note:     XR HIP 2-3 VW W PELVIS LEFT   Final Result   No acute abnormality.      Electronically signed by SAMMI GLORIA      XR FEMUR RIGHT (MIN 2 VIEWS)   Final Result   No acute abnormality.      Electronically signed by SAMMI GLORIA      CT HEAD WO CONTRAST   Final Result   No acute findings.      Electronically signed by IOANA SAMPSON MD      CT CERVICAL SPINE WO CONTRAST   Final Result   DJD C 5 C6. No acute fracture or dislocation.      Electronically signed by IOANA SAMPSON MD      CT MAXILLOFACIAL WO CONTRAST   Final Result   Fracture of nasal bones as above..      Electronically signed by IOANA SAMPSON MD           PROCEDURES   Unless otherwise noted below, none  Lac Repair    Date/Time: 8/28/2024 10:47 AM    Performed by: Soheila Tolliver MD  Authorized by: Soheila Tolliver MD    Consent:     Consent obtained:  Verbal    Consent given by:  Patient    Risks, benefits, and alternatives were discussed: yes      Alternatives discussed:  No treatment  Laceration details:     Location:  Face    Face location:  R cheek    Length (cm):  2.5    Depth (mm):  3  Exploration:     Contaminated: no    Treatment:     Area cleansed with:  Saline    Visualized foreign bodies/material removed: no      Debridement:  None  Skin repair:     Repair method:  Tissue adhesive  Repair type:     Repair type:  Simple  Post-procedure details:     Dressing:

## 2024-09-03 DIAGNOSIS — F02.A2 MILD LEWY BODY DEMENTIA WITH PSYCHOTIC DISTURBANCE (HCC): ICD-10-CM

## 2024-09-03 DIAGNOSIS — G31.83 MILD LEWY BODY DEMENTIA WITH PSYCHOTIC DISTURBANCE (HCC): ICD-10-CM

## 2024-09-03 RX ORDER — MEMANTINE HYDROCHLORIDE 5 MG/1
10 TABLET ORAL 2 TIMES DAILY
Qty: 120 TABLET | Refills: 0 | Status: SHIPPED | OUTPATIENT
Start: 2024-09-03

## 2024-09-11 ENCOUNTER — OFFICE VISIT (OUTPATIENT)
Facility: CLINIC | Age: 79
End: 2024-09-11
Payer: MEDICARE

## 2024-09-11 VITALS
TEMPERATURE: 98.4 F | HEIGHT: 62 IN | WEIGHT: 161.4 LBS | HEART RATE: 84 BPM | RESPIRATION RATE: 17 BRPM | OXYGEN SATURATION: 100 % | BODY MASS INDEX: 29.7 KG/M2 | DIASTOLIC BLOOD PRESSURE: 82 MMHG | SYSTOLIC BLOOD PRESSURE: 142 MMHG

## 2024-09-11 DIAGNOSIS — F41.9 ANXIETY: ICD-10-CM

## 2024-09-11 DIAGNOSIS — I10 PRIMARY HYPERTENSION: ICD-10-CM

## 2024-09-11 DIAGNOSIS — F02.A2 MILD LEWY BODY DEMENTIA WITH PSYCHOTIC DISTURBANCE (HCC): Primary | ICD-10-CM

## 2024-09-11 DIAGNOSIS — G25.81 RLS (RESTLESS LEGS SYNDROME): ICD-10-CM

## 2024-09-11 DIAGNOSIS — G31.83 MILD LEWY BODY DEMENTIA WITH PSYCHOTIC DISTURBANCE (HCC): Primary | ICD-10-CM

## 2024-09-11 DIAGNOSIS — E03.4 HYPOTHYROIDISM DUE TO ACQUIRED ATROPHY OF THYROID: ICD-10-CM

## 2024-09-11 DIAGNOSIS — G47.52 REM SLEEP BEHAVIOR DISORDER: ICD-10-CM

## 2024-09-11 DIAGNOSIS — G20.C ATYPICAL PARKINSONISM (HCC): ICD-10-CM

## 2024-09-11 PROCEDURE — G8428 CUR MEDS NOT DOCUMENT: HCPCS | Performed by: INTERNAL MEDICINE

## 2024-09-11 PROCEDURE — G8419 CALC BMI OUT NRM PARAM NOF/U: HCPCS | Performed by: INTERNAL MEDICINE

## 2024-09-11 PROCEDURE — 1123F ACP DISCUSS/DSCN MKR DOCD: CPT | Performed by: INTERNAL MEDICINE

## 2024-09-11 PROCEDURE — 3077F SYST BP >= 140 MM HG: CPT | Performed by: INTERNAL MEDICINE

## 2024-09-11 PROCEDURE — 3079F DIAST BP 80-89 MM HG: CPT | Performed by: INTERNAL MEDICINE

## 2024-09-11 PROCEDURE — 1090F PRES/ABSN URINE INCON ASSESS: CPT | Performed by: INTERNAL MEDICINE

## 2024-09-11 PROCEDURE — G8399 PT W/DXA RESULTS DOCUMENT: HCPCS | Performed by: INTERNAL MEDICINE

## 2024-09-11 PROCEDURE — 99214 OFFICE O/P EST MOD 30 MIN: CPT | Performed by: INTERNAL MEDICINE

## 2024-09-11 PROCEDURE — 1036F TOBACCO NON-USER: CPT | Performed by: INTERNAL MEDICINE

## 2024-09-11 SDOH — ECONOMIC STABILITY: FOOD INSECURITY: WITHIN THE PAST 12 MONTHS, YOU WORRIED THAT YOUR FOOD WOULD RUN OUT BEFORE YOU GOT MONEY TO BUY MORE.: NEVER TRUE

## 2024-09-11 SDOH — ECONOMIC STABILITY: FOOD INSECURITY: WITHIN THE PAST 12 MONTHS, THE FOOD YOU BOUGHT JUST DIDN'T LAST AND YOU DIDN'T HAVE MONEY TO GET MORE.: NEVER TRUE

## 2024-09-11 SDOH — ECONOMIC STABILITY: INCOME INSECURITY: HOW HARD IS IT FOR YOU TO PAY FOR THE VERY BASICS LIKE FOOD, HOUSING, MEDICAL CARE, AND HEATING?: NOT HARD AT ALL

## 2024-09-11 ASSESSMENT — ENCOUNTER SYMPTOMS
GASTROINTESTINAL NEGATIVE: 1
EYES NEGATIVE: 1
RESPIRATORY NEGATIVE: 1
ALLERGIC/IMMUNOLOGIC NEGATIVE: 1

## 2024-09-12 ENCOUNTER — OFFICE VISIT (OUTPATIENT)
Age: 79
End: 2024-09-12
Payer: MEDICARE

## 2024-09-12 VITALS
RESPIRATION RATE: 16 BRPM | TEMPERATURE: 97.4 F | DIASTOLIC BLOOD PRESSURE: 82 MMHG | SYSTOLIC BLOOD PRESSURE: 138 MMHG | HEART RATE: 84 BPM | OXYGEN SATURATION: 97 % | HEIGHT: 62 IN | WEIGHT: 162.2 LBS | BODY MASS INDEX: 29.85 KG/M2

## 2024-09-12 DIAGNOSIS — E55.9 VITAMIN D DEFICIENCY: ICD-10-CM
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
## 2024-09-12 DIAGNOSIS — F02.A2 MILD LEWY BODY DEMENTIA WITH PSYCHOTIC DISTURBANCE (HCC): Primary | ICD-10-CM

## 2024-09-12 DIAGNOSIS — F41.9 ANXIETY: ICD-10-CM

## 2024-09-12 DIAGNOSIS — G31.83 MILD LEWY BODY DEMENTIA WITH PSYCHOTIC DISTURBANCE (HCC): Primary | ICD-10-CM

## 2024-09-12 DIAGNOSIS — G25.81 RLS (RESTLESS LEGS SYNDROME): ICD-10-CM

## 2024-09-12 PROCEDURE — G8427 DOCREV CUR MEDS BY ELIG CLIN: HCPCS

## 2024-09-12 PROCEDURE — 1090F PRES/ABSN URINE INCON ASSESS: CPT

## 2024-09-12 PROCEDURE — 1123F ACP DISCUSS/DSCN MKR DOCD: CPT

## 2024-09-12 PROCEDURE — 3079F DIAST BP 80-89 MM HG: CPT

## 2024-09-12 PROCEDURE — G8419 CALC BMI OUT NRM PARAM NOF/U: HCPCS

## 2024-09-12 PROCEDURE — 99215 OFFICE O/P EST HI 40 MIN: CPT

## 2024-09-12 PROCEDURE — 3075F SYST BP GE 130 - 139MM HG: CPT

## 2024-09-12 PROCEDURE — G8399 PT W/DXA RESULTS DOCUMENT: HCPCS

## 2024-09-12 PROCEDURE — 1036F TOBACCO NON-USER: CPT

## 2024-09-12 RX ORDER — RIVASTIGMINE 4.6 MG/24H
1 PATCH, EXTENDED RELEASE TRANSDERMAL DAILY
Qty: 30 PATCH | Refills: 5 | Status: SHIPPED | OUTPATIENT
Start: 2024-09-12 | End: 2024-09-12

## 2024-09-12 RX ORDER — RIVASTIGMINE 4.6 MG/24H
1 PATCH, EXTENDED RELEASE TRANSDERMAL DAILY
Qty: 30 PATCH | Refills: 5 | Status: SHIPPED | OUTPATIENT
Start: 2024-09-12

## 2024-09-12 RX ORDER — MEMANTINE HYDROCHLORIDE 10 MG/1
10 TABLET ORAL 2 TIMES DAILY
Qty: 180 TABLET | Refills: 3 | Status: SHIPPED | OUTPATIENT
Start: 2024-09-12

## 2024-09-12 RX ORDER — MEMANTINE HYDROCHLORIDE 10 MG/1
10 TABLET ORAL 2 TIMES DAILY
Qty: 180 TABLET | Refills: 3 | Status: SHIPPED | OUTPATIENT
Start: 2024-09-12 | End: 2024-09-12

## 2024-09-12 ASSESSMENT — PATIENT HEALTH QUESTIONNAIRE - PHQ9
1. LITTLE INTEREST OR PLEASURE IN DOING THINGS: NOT AT ALL
SUM OF ALL RESPONSES TO PHQ QUESTIONS 1-9: 0
SUM OF ALL RESPONSES TO PHQ QUESTIONS 1-9: 0
2. FEELING DOWN, DEPRESSED OR HOPELESS: NOT AT ALL
SUM OF ALL RESPONSES TO PHQ QUESTIONS 1-9: 0
SUM OF ALL RESPONSES TO PHQ9 QUESTIONS 1 & 2: 0
SUM OF ALL RESPONSES TO PHQ QUESTIONS 1-9: 0
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
## 2024-09-24 RX ORDER — LEVOTHYROXINE SODIUM 100 UG/1
100 TABLET ORAL
Qty: 90 TABLET | Refills: 0 | Status: SHIPPED | OUTPATIENT
Start: 2024-09-24

## 2024-10-07 ENCOUNTER — CLINICAL DOCUMENTATION (OUTPATIENT)
Age: 79
End: 2024-10-07

## 2024-10-07 NOTE — PROGRESS NOTES
Faxed signed PT Evaluation on pt to Director at [facility] [place]. Faxed to [phone] and received fax confirmation.  Placed in fast scan.

## 2024-10-09 ENCOUNTER — LAB (OUTPATIENT)
Facility: CLINIC | Age: 79
End: 2024-10-09

## 2024-10-09 DIAGNOSIS — R35.0 FREQUENCY OF MICTURITION: Primary | ICD-10-CM

## 2024-10-09 DIAGNOSIS — R41.82 ALTERED MENTAL STATUS, UNSPECIFIED ALTERED MENTAL STATUS TYPE: ICD-10-CM

## 2024-10-14 DIAGNOSIS — G25.81 RLS (RESTLESS LEGS SYNDROME): Primary | ICD-10-CM
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
## 2024-10-14 RX ORDER — PRAMIPEXOLE DIHYDROCHLORIDE 0.5 MG/1
TABLET ORAL
Qty: 90 TABLET | Refills: 3 | Status: SHIPPED | OUTPATIENT
Start: 2024-10-14

## 2024-10-14 RX ORDER — PRAMIPEXOLE DIHYDROCHLORIDE 0.5 MG/1
TABLET ORAL
Qty: 90 TABLET | Refills: 3 | OUTPATIENT
Start: 2024-10-14

## 2024-10-14 NOTE — TELEPHONE ENCOUNTER
PCP: SUZIE Ty MD    Last appt: 9/11/2024    Future Appointments   Date Time Provider Department Center   5/12/2025  1:30 PM Laura Rizo, [facility] - NP [facility]       Requested Prescriptions     Pending Prescriptions Disp Refills    pramipexole (MIRAPEX) 0.5 MG tablet [Pharmacy Med Name: PRAMIPEXOLE 0.5 MG TABLET] 90 tablet 3     Sig: TAKE 1 TABLET BY MOUTH EVERY DAY AT BEDTIME FOR RESTLESS LEGS

## 2024-10-28 ENCOUNTER — CLINICAL DOCUMENTATION (OUTPATIENT)
Age: 79
End: 2024-10-28

## 2024-10-28 NOTE — PROGRESS NOTES
Faxed signed and reviewed OT evaluation to Attn:Libia Stokes at [facility]. Faxed to [phone] and received fax confirmation.    Placed in fast scan.

## 2024-11-01 ENCOUNTER — OFFICE VISIT (OUTPATIENT)
Facility: CLINIC | Age: 79
End: 2024-11-01

## 2024-11-01 VITALS
HEIGHT: 62 IN | OXYGEN SATURATION: 100 % | WEIGHT: 165.8 LBS | BODY MASS INDEX: 30.51 KG/M2 | HEART RATE: 95 BPM | RESPIRATION RATE: 17 BRPM | SYSTOLIC BLOOD PRESSURE: 126 MMHG | DIASTOLIC BLOOD PRESSURE: 78 MMHG | TEMPERATURE: 99.2 F

## 2024-11-01 DIAGNOSIS — R23.4: ICD-10-CM

## 2024-11-01 DIAGNOSIS — M19.90 ARTHRITIS: Primary | ICD-10-CM

## 2024-11-01 NOTE — PROGRESS NOTES
Mayra Wagner is a 79 y.o. female and presents with Left knee pain/both hands swollen  .    Subjective:    Mrs. Wagner presents today companied by her daughter Alice with complaint of swelling of her hands and a scab on her left knee.  She has a mild discomfort on her left knee after sustaining a fall a couple of weeks ago.  She did have some bleeding and has a scab which has formed in its place.  There is no surrounding redness or drainage.  She has also noticed some swelling of her hands which is consistent with her history of osteoarthritis.  She will have pain intermittently in her hands.  She has gained a bit of weight which may be secondary to her medications.  She is eating well but tends not to drink fluids because she has some urinary incontinence.  She is wearing depends.  Past Medical History:   Diagnosis Date    Adult onset hypothyroidism 10/19/2017    Anxiety 7/27/2022    Arthritis     feet and hands    Back pain with radiation 10/19/2017    Breast cyst 6 months ago     right breast     Disorder of bone and cartilage 10/19/2017    GERD (gastroesophageal reflux disease)     High risk medication use 10/19/2017    Hip pain, acute, left 10/19/2017    Hyperlipidemia 10/19/2017    Hypothyroid 10/19/2017    Irritable bowel syndrome with constipation 10/19/2017    Knee pain 10/19/2017    Menopausal disorder 10/19/2017    Menopause     Migraine 10/19/2017    Neurological disorder     migraines    On statin therapy 6/18/2019    Other ill-defined conditions(799.89)     Other ill-defined conditions(799.89)     hypothyroid    Other ill-defined conditions(799.89)     elevatd cholesterol    RLS (restless legs syndrome) 9/21/2022    Urinary frequency 10/19/2017     Past Surgical History:   Procedure Laterality Date    BREAST BIOPSY Left 11 years ago     negative    CATARACT REMOVAL Left 07/2020    COLONOSCOPY Left 6/1/2018    COLONOSCOPY performed by Norberto Grimm MD at [facility] ENDOSCOPY    GYN

## 2024-11-01 NOTE — PROGRESS NOTES
Mayra Wagner is a 79 y.o. female     Chief Complaint   Patient presents with    Left knee pain/both hands swollen       /78 (Site: Left Upper Arm, Position: Sitting, Cuff Size: Medium Adult)   Pulse 95   Temp 99.2 °F (37.3 °C) (Temporal)   Resp 17   Ht 1.575 m (5' 2.01\")   Wt 75.2 kg (165 lb 12.8 oz)   SpO2 100%   BMI 30.32 kg/m²     Health Maintenance Due   Topic Date Due    Shingles vaccine (1 of 2) Never done    Respiratory Syncytial Virus (RSV) Pregnant or age 60 yrs+ (1 - 1-dose 60+ series) Never done    Flu vaccine (1) 08/01/2024    DTaP/Tdap/Td vaccine (1 - Tdap) 08/29/2024    COVID-19 Vaccine (6 - 2023-24 season) 09/01/2024         \"Have you been to the ER, urgent care clinic since your last visit?  Hospitalized since your last visit?\"    NO    “Have you seen or consulted any other health care providers outside of [facility] since your last visit?”    NO

## 2024-11-13 ENCOUNTER — CLINICAL DOCUMENTATION (OUTPATIENT)
Age: 79
End: 2024-11-13

## 2024-11-13 NOTE — PROGRESS NOTES
Faxed signed and reviewed PT recertification forms tp Attn:Libia Stokes at [phone] and received fax confirmation.  Placed in fast scan.

## 2024-11-14 ENCOUNTER — OFFICE VISIT (OUTPATIENT)
Facility: CLINIC | Age: 79
End: 2024-11-14

## 2024-11-14 VITALS
RESPIRATION RATE: 18 BRPM | DIASTOLIC BLOOD PRESSURE: 79 MMHG | BODY MASS INDEX: 30.8 KG/M2 | SYSTOLIC BLOOD PRESSURE: 128 MMHG | HEIGHT: 62 IN | TEMPERATURE: 98.9 F | HEART RATE: 82 BPM | WEIGHT: 167.4 LBS | OXYGEN SATURATION: 98 %

## 2024-11-14 DIAGNOSIS — R60.0 PEDAL EDEMA: Primary | ICD-10-CM

## 2024-11-14 NOTE — PROGRESS NOTES
Mayra Wagner is a 79 y.o. female     Chief Complaint   Patient presents with    Leg Swelling     Both legs       /79 (Site: Left Upper Arm, Position: Sitting, Cuff Size: Medium Adult)   Pulse 82   Temp 98.9 °F (37.2 °C) (Temporal)   Resp 18   Ht 1.575 m (5' 2.01\")   Wt 75.9 kg (167 lb 6.4 oz)   SpO2 98%   BMI 30.61 kg/m²     Health Maintenance Due   Topic Date Due    Shingles vaccine (1 of 2) Never done    Respiratory Syncytial Virus (RSV) Pregnant or age 60 yrs+ (1 - 1-dose 60+ series) Never done    Flu vaccine (1) 08/01/2024    DTaP/Tdap/Td vaccine (1 - Tdap) 08/29/2024    COVID-19 Vaccine (6 - 2023-24 season) 09/01/2024         \"Have you been to the ER, urgent care clinic since your last visit?  Hospitalized since your last visit?\"    NO    “Have you seen or consulted any other health care providers outside of [facility] since your last visit?”    NO

## 2024-11-14 NOTE — PROGRESS NOTES
Mayra Wagner is a 79 y.o. female and presents with Leg Swelling (Both legs)  .    Subjective:  Mrs. Wagner presents today with complaint of swelling of both legs.  She is companied by her daughter.  She notes the swelling increases during the course of the day.  The swelling tends to be less in the mornings upon first awakening.  She has no pain or discomfort related to this.  She was seen recently and her labs were stable.  She is on low-dose amlodipine for her blood pressure which is well-controlled currently.  She has some urinary frequency and urinary incontinence so we have avoided putting her on a diuretic.  Her thyroid function is stable on her current dose of levothyroxine.  Past Medical History:   Diagnosis Date    Adult onset hypothyroidism 10/19/2017    Anxiety 7/27/2022    Arthritis     feet and hands    Back pain with radiation 10/19/2017    Breast cyst 6 months ago     right breast     Disorder of bone and cartilage 10/19/2017    GERD (gastroesophageal reflux disease)     High risk medication use 10/19/2017    Hip pain, acute, left 10/19/2017    Hyperlipidemia 10/19/2017    Hypothyroid 10/19/2017    Irritable bowel syndrome with constipation 10/19/2017    Knee pain 10/19/2017    Menopausal disorder 10/19/2017    Menopause     Migraine 10/19/2017    Neurological disorder     migraines    On statin therapy 6/18/2019    Other ill-defined conditions(799.89)     Other ill-defined conditions(799.89)     hypothyroid    Other ill-defined conditions(799.89)     elevatd cholesterol    RLS (restless legs syndrome) 9/21/2022    Urinary frequency 10/19/2017     Past Surgical History:   Procedure Laterality Date    BREAST BIOPSY Left 11 years ago     negative    CATARACT REMOVAL Left 07/2020    COLONOSCOPY Left 6/1/2018    COLONOSCOPY performed by Norberto Grimm MD at [facility] ENDOSCOPY    GYN      hysterectomy    HYSTERECTOMY (CERVIX STATUS UNKNOWN)      ORTHOPEDIC SURGERY  11/9/11    SPINE LUMBAR

## 2024-11-24 ENCOUNTER — APPOINTMENT (OUTPATIENT)
Facility: HOSPITAL | Age: 79
End: 2024-11-24
Payer: MEDICARE

## 2024-11-24 ENCOUNTER — HOSPITAL ENCOUNTER (EMERGENCY)
Facility: HOSPITAL | Age: 79
Discharge: NURSING, REHAB OR LONG-TERM CARE | End: 2024-11-24
Attending: STUDENT IN AN ORGANIZED HEALTH CARE EDUCATION/TRAINING PROGRAM
Payer: MEDICARE

## 2024-11-24 VITALS
HEART RATE: 85 BPM | OXYGEN SATURATION: 97 % | DIASTOLIC BLOOD PRESSURE: 86 MMHG | RESPIRATION RATE: 18 BRPM | SYSTOLIC BLOOD PRESSURE: 165 MMHG | TEMPERATURE: 97.5 F

## 2024-11-24 DIAGNOSIS — M25.512 ACUTE PAIN OF LEFT SHOULDER: Primary | ICD-10-CM

## 2024-11-24 PROCEDURE — 72125 CT NECK SPINE W/O DYE: CPT

## 2024-11-24 PROCEDURE — 70450 CT HEAD/BRAIN W/O DYE: CPT

## 2024-11-24 PROCEDURE — 73030 X-RAY EXAM OF SHOULDER: CPT

## 2024-11-24 PROCEDURE — 6370000000 HC RX 637 (ALT 250 FOR IP)

## 2024-11-24 PROCEDURE — 99284 EMERGENCY DEPT VISIT MOD MDM: CPT

## 2024-11-24 PROCEDURE — 71250 CT THORAX DX C-: CPT

## 2024-11-24 RX ORDER — ACETAMINOPHEN 500 MG
1000 TABLET ORAL
Status: COMPLETED | OUTPATIENT
Start: 2024-11-24 | End: 2024-11-24

## 2024-11-24 RX ORDER — IBUPROFEN 600 MG/1
600 TABLET, FILM COATED ORAL
Status: COMPLETED | OUTPATIENT
Start: 2024-11-24 | End: 2024-11-24

## 2024-11-24 RX ADMIN — IBUPROFEN 600 MG: 600 TABLET, FILM COATED ORAL at 08:50

## 2024-11-24 RX ADMIN — ACETAMINOPHEN 1000 MG: 500 TABLET ORAL at 08:50

## 2024-11-24 ASSESSMENT — PAIN DESCRIPTION - LOCATION: LOCATION: SHOULDER

## 2024-11-24 ASSESSMENT — PAIN DESCRIPTION - ORIENTATION: ORIENTATION: LEFT

## 2024-11-24 ASSESSMENT — PAIN SCALES - GENERAL: PAINLEVEL_OUTOF10: 10

## 2024-11-24 ASSESSMENT — PAIN - FUNCTIONAL ASSESSMENT
PAIN_FUNCTIONAL_ASSESSMENT: NONE - DENIES PAIN
PAIN_FUNCTIONAL_ASSESSMENT: 0-10

## 2024-11-24 NOTE — ED NOTES
Bedside and Verbal shift change report given to MARY Zabala (oncoming nurse) by MARY Jay (offgoing nurse). Report included the following information Nurse Handoff Report, Index, ED Encounter Summary, ED SBAR, Recent Results, Med Rec Status, and Neuro Assessment.    
Off the floor to CT  
Pt intermittently desats to 86%, RN applied 2L NC, now 96% - denies any SOB or CP; MD berry   
Pt presents to ER from [place] via EMS after a mechanical GLF where she was attempting to sit in her chair and missed. HX of dementia, pt reports hitting her head and pain to the L shoulder. Denies LOC, ER Resident at bedside - C-collar applied at this time   
RN removed oxygen and C-collar at this time. Pt is awake, alert, and satting 98% on RA at this time   
tablet 1,000 mg (has no administration in time range)     CONSULTS:    None    Social Determinants affecting Diagnosis/Treatment: None    DISCUSSION:  Patient is conscious alert and oriented x 4.  She is in no acute distress.  She is hemodynamically stable though hypertensive.  Patient has history of dementia and is unable to give for full medical history, so has some confusion at baseline.  She does mention midline neck tenderness on exam.  Will imaging neck and head.  Shortly after my exam the patient had minor desaturation to 88% that was not sustained but did have good Plath per nursing.  Given this and her chest pain will CT scan Noncon her chest to assess for rib fracture or spinal injury.  Patient was placed in a c-collar.  No life threats identified on primary or secondary survey.  Will x-ray shoulder.  Will trial minor pain relief    ADDITIONAL CONSIDERATIONS:  None  Procedures/Critical Care     Procedures    ED FINAL IMPRESSION     No diagnosis found.    DISPOSITION/PLAN     DISPOSITION          Discharge Note: The patient is stable for discharge home. The signs, symptoms, diagnosis, and discharge instructions have been discussed, understanding conveyed, and agreed upon. The patient is to follow up as recommended or return to ER should their symptoms worsen.     PATIENT REFERRED TO:    No follow-up provider specified.    DISCHARGE MEDICATIONS:       Medication List        ASK your doctor about these medications      amLODIPine 2.5 MG tablet  Commonly known as: Norvasc  Take 1 tablet by mouth daily     levothyroxine 100 MCG tablet  Commonly known as: SYNTHROID  TAKE 1 TABLET BY MOUTH EVERY DAY BEFORE BREAKFAST     memantine 10 MG tablet  Commonly known as: NAMENDA  Take 1 tablet by mouth 2 times daily     pramipexole 0.5 MG tablet  Commonly known as: MIRAPEX  TAKE 1 TABLET BY MOUTH EVERY DAY AT BEDTIME FOR RESTLESS LEGS     QUEtiapine 25 MG tablet  Commonly known as: SEROQUEL  Take 2 tablets by mouth

## 2024-12-03 ENCOUNTER — CLINICAL DOCUMENTATION (OUTPATIENT)
Age: 79
End: 2024-12-03

## 2024-12-03 NOTE — PROGRESS NOTES
Faxed signed and reviewed PT recertification to Attn:Libia Stokes at [facility] [place]. Faxed to [phone] and received fax confirmation.    Placed in fast scan.

## 2024-12-24 ENCOUNTER — CLINICAL DOCUMENTATION (OUTPATIENT)
Age: 79
End: 2024-12-24

## 2024-12-24 NOTE — PROGRESS NOTES
Faxed signed and reviewed PT Recert to Attn: Libia Stokes at [phone] and received fax confirmation.  Placed in fast scan.

## 2025-01-07 ENCOUNTER — TELEPHONE (OUTPATIENT)
Facility: CLINIC | Age: 80
End: 2025-01-07

## 2025-01-07 RX ORDER — GENTAMICIN SULFATE 3 MG/ML
SOLUTION/ DROPS OPHTHALMIC
Qty: 5 ML | Refills: 0 | Status: SHIPPED | OUTPATIENT
Start: 2025-01-07

## 2025-01-07 NOTE — TELEPHONE ENCOUNTER
Alice called stating her mother has pink eye (right).  The nurse at her facility spoke with Alice yesterday stating her eye was running a lot and red.  Alice wanted to know if Dr. Ty would send in a prescription to [facility].  Patient is scheduled to see Dr. Ty on Thursday at 9:45am.

## 2025-01-28 ENCOUNTER — TELEPHONE (OUTPATIENT)
Facility: CLINIC | Age: 80
End: 2025-01-28

## 2025-01-28 NOTE — TELEPHONE ENCOUNTER
Patient has been way more confused and her neurologist wanted her to have a lab test to make sure she does not have a UTI.  Daughter wanted to know can she drop off a urine tomorrow?

## 2025-02-19 ENCOUNTER — APPOINTMENT (OUTPATIENT)
Facility: HOSPITAL | Age: 80
End: 2025-02-19
Payer: MEDICARE

## 2025-02-19 ENCOUNTER — HOSPITAL ENCOUNTER (EMERGENCY)
Facility: HOSPITAL | Age: 80
Discharge: HOME | End: 2025-02-19
Attending: EMERGENCY MEDICINE
Payer: MEDICARE

## 2025-02-19 VITALS
SYSTOLIC BLOOD PRESSURE: 149 MMHG | RESPIRATION RATE: 16 BRPM | BODY MASS INDEX: 29.44 KG/M2 | DIASTOLIC BLOOD PRESSURE: 84 MMHG | TEMPERATURE: 97.6 F | HEIGHT: 62 IN | HEART RATE: 76 BPM | WEIGHT: 160 LBS | OXYGEN SATURATION: 99 %

## 2025-02-19 DIAGNOSIS — S09.90XA CLOSED HEAD INJURY, INITIAL ENCOUNTER: ICD-10-CM

## 2025-02-19 DIAGNOSIS — S52.532A CLOSED COLLES' FRACTURE OF LEFT RADIUS, INITIAL ENCOUNTER: Primary | ICD-10-CM
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
## 2025-02-19 LAB
ALBUMIN SERPL-MCNC: 3.4 G/DL (ref 3.5–5)
ALBUMIN/GLOB SERPL: 0.9 (ref 1.1–2.2)
ALP SERPL-CCNC: 86 U/L (ref 45–117)
ALT SERPL-CCNC: 22 U/L (ref 12–78)
ANION GAP SERPL CALC-SCNC: 4 MMOL/L (ref 2–12)
APPEARANCE UR: CLEAR
AST SERPL-CCNC: 29 U/L (ref 15–37)
BACTERIA URNS QL MICRO: NEGATIVE /HPF
BASOPHILS # BLD: 0 K/UL (ref 0–0.1)
BASOPHILS NFR BLD: 0 % (ref 0–1)
BILIRUB SERPL-MCNC: 0.5 MG/DL (ref 0.2–1)
BILIRUB UR QL: NEGATIVE
BUN SERPL-MCNC: 21 MG/DL (ref 6–20)
BUN/CREAT SERPL: 25 (ref 12–20)
CALCIUM SERPL-MCNC: 8.9 MG/DL (ref 8.5–10.1)
CHLORIDE SERPL-SCNC: 105 MMOL/L (ref 97–108)
CO2 SERPL-SCNC: 29 MMOL/L (ref 21–32)
COLOR UR: ABNORMAL
CREAT SERPL-MCNC: 0.83 MG/DL (ref 0.55–1.02)
DIFFERENTIAL METHOD BLD: NORMAL
EOSINOPHIL # BLD: 0.09 K/UL (ref 0–0.4)
EOSINOPHIL NFR BLD: 1 % (ref 0–7)
EPITH CASTS URNS QL MICRO: ABNORMAL /LPF
ERYTHROCYTE [DISTWIDTH] IN BLOOD BY AUTOMATED COUNT: 12.9 % (ref 11.5–14.5)
GLOBULIN SER CALC-MCNC: 3.6 G/DL (ref 2–4)
GLUCOSE SERPL-MCNC: 91 MG/DL (ref 65–100)
GLUCOSE UR STRIP.AUTO-MCNC: NEGATIVE MG/DL
HCT VFR BLD AUTO: 41.7 % (ref 35–47)
HGB BLD-MCNC: 13.9 G/DL (ref 11.5–16)
HGB UR QL STRIP: ABNORMAL
HYALINE CASTS URNS QL MICRO: ABNORMAL /LPF (ref 0–2)
IMM GRANULOCYTES # BLD AUTO: 0 K/UL (ref 0–0.04)
IMM GRANULOCYTES NFR BLD AUTO: 0 % (ref 0–0.5)
KETONES UR QL STRIP.AUTO: NEGATIVE MG/DL
LEUKOCYTE ESTERASE UR QL STRIP.AUTO: ABNORMAL
LYMPHOCYTES # BLD: 2.41 K/UL (ref 0.8–3.5)
LYMPHOCYTES NFR BLD: 28 % (ref 12–49)
MCH RBC QN AUTO: 30.8 PG (ref 26–34)
MCHC RBC AUTO-ENTMCNC: 33.3 G/DL (ref 30–36.5)
MCV RBC AUTO: 92.5 FL (ref 80–99)
MONOCYTES # BLD: 0.6 K/UL (ref 0–1)
MONOCYTES NFR BLD: 7 % (ref 5–13)
NEUTS SEG # BLD: 5.5 K/UL (ref 1.8–8)
NEUTS SEG NFR BLD: 64 % (ref 32–75)
NITRITE UR QL STRIP.AUTO: NEGATIVE
NRBC # BLD: 0 K/UL (ref 0–0.01)
NRBC BLD-RTO: 0 PER 100 WBC
PH UR STRIP: 7 (ref 5–8)
PLATELET # BLD AUTO: 232 K/UL (ref 150–400)
PMV BLD AUTO: 8.9 FL (ref 8.9–12.9)
POTASSIUM SERPL-SCNC: 4.4 MMOL/L (ref 3.5–5.1)
PROT SERPL-MCNC: 7 G/DL (ref 6.4–8.2)
PROT UR STRIP-MCNC: NEGATIVE MG/DL
RBC # BLD AUTO: 4.51 M/UL (ref 3.8–5.2)
RBC #/AREA URNS HPF: ABNORMAL /HPF (ref 0–5)
RBC MORPH BLD: NORMAL
SODIUM SERPL-SCNC: 138 MMOL/L (ref 136–145)
SP GR UR REFRACTOMETRY: 1.01
TROPONIN I SERPL HS-MCNC: <4 NG/L (ref 0–51)
URINE CULTURE IF INDICATED: ABNORMAL
UROBILINOGEN UR QL STRIP.AUTO: 1 EU/DL (ref 0.2–1)
WBC # BLD AUTO: 8.6 K/UL (ref 3.6–11)
WBC URNS QL MICRO: ABNORMAL /HPF (ref 0–4)

## 2025-02-19 PROCEDURE — 70450 CT HEAD/BRAIN W/O DYE: CPT

## 2025-02-19 PROCEDURE — 99285 EMERGENCY DEPT VISIT HI MDM: CPT

## 2025-02-19 PROCEDURE — 6360000002 HC RX W HCPCS: Performed by: EMERGENCY MEDICINE

## 2025-02-19 PROCEDURE — 93005 ELECTROCARDIOGRAM TRACING: CPT | Performed by: EMERGENCY MEDICINE

## 2025-02-19 PROCEDURE — 85025 COMPLETE CBC W/AUTO DIFF WBC: CPT

## 2025-02-19 PROCEDURE — 81001 URINALYSIS AUTO W/SCOPE: CPT

## 2025-02-19 PROCEDURE — 73110 X-RAY EXAM OF WRIST: CPT

## 2025-02-19 PROCEDURE — 29125 APPL SHORT ARM SPLINT STATIC: CPT

## 2025-02-19 PROCEDURE — 80053 COMPREHEN METABOLIC PANEL: CPT

## 2025-02-19 PROCEDURE — 36415 COLL VENOUS BLD VENIPUNCTURE: CPT

## 2025-02-19 PROCEDURE — 84484 ASSAY OF TROPONIN QUANT: CPT

## 2025-02-19 RX ORDER — LIDOCAINE HYDROCHLORIDE 20 MG/ML
10 INJECTION, SOLUTION EPIDURAL; INFILTRATION; INTRACAUDAL; PERINEURAL
Status: COMPLETED | OUTPATIENT
Start: 2025-02-19 | End: 2025-02-19

## 2025-02-19 RX ADMIN — LIDOCAINE HYDROCHLORIDE 10 ML: 20 INJECTION, SOLUTION EPIDURAL; INFILTRATION; INTRACAUDAL; PERINEURAL at 10:10

## 2025-02-19 ASSESSMENT — PAIN SCALES - GENERAL: PAINLEVEL_OUTOF10: 9

## 2025-02-19 ASSESSMENT — PAIN - FUNCTIONAL ASSESSMENT
PAIN_FUNCTIONAL_ASSESSMENT: 0-10
PAIN_FUNCTIONAL_ASSESSMENT: PREVENTS OR INTERFERES WITH MANY ACTIVE NOT PASSIVE ACTIVITIES

## 2025-02-19 ASSESSMENT — LIFESTYLE VARIABLES
HOW MANY STANDARD DRINKS CONTAINING ALCOHOL DO YOU HAVE ON A TYPICAL DAY: PATIENT DOES NOT DRINK
HOW OFTEN DO YOU HAVE A DRINK CONTAINING ALCOHOL: NEVER

## 2025-02-19 ASSESSMENT — PAIN DESCRIPTION - FREQUENCY: FREQUENCY: CONTINUOUS

## 2025-02-19 ASSESSMENT — PAIN DESCRIPTION - ONSET: ONSET: SUDDEN

## 2025-02-19 ASSESSMENT — PAIN DESCRIPTION - PAIN TYPE: TYPE: ACUTE PAIN

## 2025-02-19 ASSESSMENT — PAIN DESCRIPTION - ORIENTATION: ORIENTATION: LEFT

## 2025-02-19 ASSESSMENT — PAIN DESCRIPTION - LOCATION: LOCATION: WRIST

## 2025-02-19 ASSESSMENT — PAIN DESCRIPTION - DESCRIPTORS: DESCRIPTORS: DULL;ACHING

## 2025-02-19 NOTE — ED PROVIDER NOTES
[facility] EMERGENCY DEPARTMENT  EMERGENCY DEPARTMENT ENCOUNTER       Pt Name: Mayra Wagner  MRN: [id number]  Birthdate 1945  Date of evaluation: 2/19/2025  Provider: Hong Ferreira MD   PCP: SUZIE Ty MD  Note Started: 1:51 PM 2/19/25     CHIEF COMPLAINT       Chief Complaint   Patient presents with    Fall     Patient to triage from home after a ground-level fall this morning. Patient states she fell onto her L side, with impact to her head. Patient denies LOC, does not take blood thinners. Pain localized to L side of head and L wrist. Family confirms that the patient did take Tylenol around 0530.        HISTORY OF PRESENT ILLNESS: 1 or more elements      History From: Patient, daughter, History limited by: History of dementia     Mayra Wagner is a 79 y.o. female with history of Lewy body dementia, frequent falls who presents with chief complaint of fall, left wrist pain, possible head injury.  Patient states that this occurred earlier today, she was ambulating into her kitchen when she had sudden onset \"dizziness\".  Denies any syncope but did experience a fall catching herself with her outstretched left arm.  Does not know if she struck her head.  Denies any loss of consciousness, head injury, nausea, vomiting, chest pain, or shortness of breath.  States that she has \"dizzy\" episodes from time to time.  Currently she is feeling well in the ED except for left wrist pain.     Nursing Notes were all reviewed and agreed with or any disagreements were addressed in the HPI.     REVIEW OF SYSTEMS        Positives and Pertinent negatives as per HPI.    PAST HISTORY     Past Medical History:  Past Medical History:   Diagnosis Date    Adult onset hypothyroidism 10/19/2017    Anxiety 7/27/2022    Arthritis     feet and hands    Back pain with radiation 10/19/2017    Breast cyst 6 months ago     right breast     Disorder of bone and cartilage 10/19/2017    GERD (gastroesophageal reflux disease)

## 2025-02-19 NOTE — ED NOTES
Report given to MARY Quinn by Juancarlos Huber RN. Nurse was informed of reason for arrival, vitals, labs, medications, orders, procedures, results, anything left pending and current plan of action. Questions were asked and received prior to departure from the patient.

## 2025-02-19 NOTE — DISCHARGE INSTRUCTIONS
You were evaluated in the emergency department for fall resulting in a left wrist fracture which was splinted.  Your examination was reassuring as was your work-up including CT scan of head, EKG, lab work, and urinalysis.  It will be important for you to follow-up with your primary care physician in 3-7 days, you will also need to follow-up with a hand surgeon within 7 days.  If you develop worsening symptoms such as recurrent falls or weakness or numbness of your left hand or severely worsening pain, please return to the emergency department immediately.

## 2025-02-22 LAB
EKG ATRIAL RATE: 77 BPM
EKG DIAGNOSIS: NORMAL
EKG P AXIS: 60 DEGREES
EKG P-R INTERVAL: 152 MS
EKG Q-T INTERVAL: 394 MS
EKG QRS DURATION: 82 MS
EKG QTC CALCULATION (BAZETT): 445 MS
EKG R AXIS: 24 DEGREES
EKG T AXIS: 55 DEGREES
EKG VENTRICULAR RATE: 77 BPM

## 2025-05-12 ENCOUNTER — OFFICE VISIT (OUTPATIENT)
Age: 80
End: 2025-05-12
Payer: MEDICARE

## 2025-05-12 VITALS
WEIGHT: 162 LBS | DIASTOLIC BLOOD PRESSURE: 68 MMHG | HEART RATE: 78 BPM | SYSTOLIC BLOOD PRESSURE: 116 MMHG | BODY MASS INDEX: 29.81 KG/M2 | RESPIRATION RATE: 15 BRPM | HEIGHT: 62 IN | OXYGEN SATURATION: 100 %

## 2025-05-12 DIAGNOSIS — F02.A2 MILD LEWY BODY DEMENTIA WITH PSYCHOTIC DISTURBANCE (HCC): Primary | ICD-10-CM

## 2025-05-12 DIAGNOSIS — E55.9 VITAMIN D DEFICIENCY: ICD-10-CM

## 2025-05-12 DIAGNOSIS — G31.83 MILD LEWY BODY DEMENTIA WITH PSYCHOTIC DISTURBANCE (HCC): Primary | ICD-10-CM

## 2025-05-12 DIAGNOSIS — F41.9 ANXIETY: ICD-10-CM

## 2025-05-12 DIAGNOSIS — G25.81 RLS (RESTLESS LEGS SYNDROME): ICD-10-CM

## 2025-05-12 PROCEDURE — 1090F PRES/ABSN URINE INCON ASSESS: CPT

## 2025-05-12 PROCEDURE — 1036F TOBACCO NON-USER: CPT

## 2025-05-12 PROCEDURE — 1126F AMNT PAIN NOTED NONE PRSNT: CPT

## 2025-05-12 PROCEDURE — 99215 OFFICE O/P EST HI 40 MIN: CPT

## 2025-05-12 PROCEDURE — 3074F SYST BP LT 130 MM HG: CPT

## 2025-05-12 PROCEDURE — 1160F RVW MEDS BY RX/DR IN RCRD: CPT

## 2025-05-12 PROCEDURE — 1159F MED LIST DOCD IN RCRD: CPT

## 2025-05-12 PROCEDURE — G8399 PT W/DXA RESULTS DOCUMENT: HCPCS

## 2025-05-12 PROCEDURE — G8417 CALC BMI ABV UP PARAM F/U: HCPCS

## 2025-05-12 PROCEDURE — 1123F ACP DISCUSS/DSCN MKR DOCD: CPT

## 2025-05-12 PROCEDURE — 3078F DIAST BP <80 MM HG: CPT

## 2025-05-12 PROCEDURE — G8427 DOCREV CUR MEDS BY ELIG CLIN: HCPCS

## 2025-05-12 RX ORDER — RIVASTIGMINE 4.6 MG/24H
1 PATCH, EXTENDED RELEASE TRANSDERMAL DAILY
Qty: 30 PATCH | Refills: 5 | Status: SHIPPED | OUTPATIENT
Start: 2025-05-12

## 2025-05-12 RX ORDER — QUETIAPINE FUMARATE 25 MG/1
25 TABLET, FILM COATED ORAL NIGHTLY
Qty: 60 TABLET | Refills: 4 | Status: SHIPPED | OUTPATIENT
Start: 2025-05-12

## 2025-05-12 RX ORDER — MEMANTINE HYDROCHLORIDE 10 MG/1
10 TABLET ORAL 2 TIMES DAILY
Qty: 180 TABLET | Refills: 3 | Status: SHIPPED | OUTPATIENT
Start: 2025-05-12

## 2025-05-12 ASSESSMENT — PATIENT HEALTH QUESTIONNAIRE - PHQ9
SUM OF ALL RESPONSES TO PHQ QUESTIONS 1-9: 0
1. LITTLE INTEREST OR PLEASURE IN DOING THINGS: NOT AT ALL
2. FEELING DOWN, DEPRESSED OR HOPELESS: NOT AT ALL

## 2025-05-12 ASSESSMENT — ENCOUNTER SYMPTOMS
ALLERGIC/IMMUNOLOGIC NEGATIVE: 1
RESPIRATORY NEGATIVE: 1
EYES NEGATIVE: 1
GASTROINTESTINAL NEGATIVE: 1

## 2025-05-12 NOTE — PROGRESS NOTES
1. Have you been to the ER, urgent care clinic since your last visit?  Hospitalized since your last visit?\  no.    2. Have you seen or consulted any other health care providers outside of the [facility] since your last visit?  Include any pap smears or colon screening.   Lives at [place]       Chief Complaint   Patient presents with    Dementia     Pt is crying most of the day, living in alternate reality per daughter          
Monocytes Absolute 02/19/2025 0.60  0.00 - 1.00 K/UL Final    Eosinophils Absolute 02/19/2025 0.09  0.00 - 0.40 K/UL Final    Basophils Absolute 02/19/2025 0.00  0.00 - 0.10 K/UL Final    Immature Granulocytes Absolute 02/19/2025 0.00  0.00 - 0.04 K/UL Final    Differential Type 02/19/2025 MANUAL    Final    RBC Comment 02/19/2025 NORMOCYTIC, NORMOCHROMIC    Final    Sodium 02/19/2025 138  136 - 145 mmol/L Final    Potassium 02/19/2025 4.4  3.5 - 5.1 mmol/L Final    Comment: Sample is hemolyzed (hemoglobin is  likely >50 mg/dL) and thus the  potassium, AST, ammonia, and  phosphorus results may be adversely  affected. If the clinical situation  warrants, recommend recollection of  a new specimen with attention to  preventing hemolysis.      Chloride 02/19/2025 105  97 - 108 mmol/L Final    CO2 02/19/2025 29  21 - 32 mmol/L Final    Anion Gap 02/19/2025 4  2 - 12 mmol/L Final    PLEASE NOTE NEW REFERENCE RANGE    Glucose 02/19/2025 91  65 - 100 mg/dL Final    BUN 02/19/2025 21 (H)  6 - 20 MG/DL Final    Creatinine 02/19/2025 0.83  0.55 - 1.02 MG/DL Final    BUN/Creatinine Ratio 02/19/2025 25 (H)  12 - 20   Final    Est, Glom Filt Rate 02/19/2025 72  >60 ml/min/1.73m2 Final    Comment:    Pediatric calculator link: https://www.kidney.org/professionals/kdoqi/gfr_calculatorped     These results are not intended for use in patients <18 years of age.     eGFR results are calculated without a race factor using  the 2021 CKD-EPI equation. Careful clinical correlation is recommended, particularly when comparing to results calculated using previous equations.  The CKD-EPI equation is less accurate in patients with extremes of muscle mass, extra-renal metabolism of creatinine, excessive creatine ingestion, or following therapy that affects renal tubular secretion.      Calcium 02/19/2025 8.9  8.5 - 10.1 MG/DL Final    Total Bilirubin 02/19/2025 0.5  0.2 - 1.0 MG/DL Final    ALT 02/19/2025 22  12 - 78 U/L Final    AST 02/19/2025

## 2025-05-12 NOTE — PATIENT INSTRUCTIONS
As per our discussion,    Overall, you did really well today.    I will not make any changes on your medications at this time.  Will continue Namenda 10 mg twice daily and Exelon patch as prescribed.  I have sent additional refills for both medications to your pharmacy.    Given you continue to experience hallucinations and sadness, I believed you need to be seen by a mental health provider, a psychiatrist who will help managing your moods.  I will provide a list of mental health providers and hopefully you can find one who can help you.    I encouraged you  to engage in approximately 2.5 hours of physician approved physical activity on a weekly basis.    To maintain a healthy diet. Also was informed of the Mediterranean diet, which has been associated with reduced risk for neurodegenerative conditions as well as heart disease.    To maintain a cognitively stimulated lifestyle, also incorporating social interaction.    As for restless leg, we will continue pramipexole 0.5 mg at bedtime.    It is always a pleasure to see you and your daughter    Will see back in 6 months or sooner if needed    Please do not hesitate to reach out for any questions or concerns.

## 2025-05-13 NOTE — ASSESSMENT & PLAN NOTE
Stable    Well managed on pramipexole 0.5 mg at bedtime.    This has been monitored by specialist- no acute findings meriting change in the plan    Patient is to continue to take pramipexole 0.5 mg as prescribed.

## 2025-05-13 NOTE — ASSESSMENT & PLAN NOTE
Symptoms not controlled on current regimen.    She does take Effexor  mg daily which has been managed by her primary care provider.    A list of mental health providers was provided for further management for anxiety and depression.

## 2025-05-13 NOTE — ASSESSMENT & PLAN NOTE
On supplementation     This has been monitored by her PCP- no acute findings meriting change in the plan    She is to continue to follow-up with PCP as appropriate.

## 2025-06-17 ENCOUNTER — TELEPHONE (OUTPATIENT)
Facility: CLINIC | Age: 80
End: 2025-06-17

## 2025-06-17 NOTE — TELEPHONE ENCOUNTER
Patients daughter would like a call back. She thinks her mother has a uti and would like to know if dr robbins can put orders in for her

## 2025-06-17 NOTE — TELEPHONE ENCOUNTER
Alice called back, she stated pt is experiencing back pain and her hallucination episodes are intensifying. Alice wants to know if you'd like to see pt or have her just bring in a urine sample. Please advise.

## (undated) DEVICE — STERILE POLYISOPRENE POWDER-FREE SURGICAL GLOVES: Brand: PROTEXIS

## (undated) DEVICE — Z DISCONTINUED NO SUB IDED SET EXTN W/ 4 W STPCOCK M SPIN LOK 36IN

## (undated) DEVICE — BAG BELONG PT PERS CLEAR HANDL

## (undated) DEVICE — 1200 GUARD II KIT W/5MM TUBE W/O VAC TUBE: Brand: GUARDIAN

## (undated) DEVICE — SYR 3ML LL TIP 1/10ML GRAD --

## (undated) DEVICE — NDL FLTR TIP 5 MIC 18GX1.5IN --

## (undated) DEVICE — APPLICATOR,COTTON-TIP,WOOD,3,STRL: Brand: MEDLINE

## (undated) DEVICE — SOLUTION IV STRL H2O 500 ML AQUALITE POUR BTL

## (undated) DEVICE — SURGICAL PROCEDURE PACK EYE CUST DR CHNDLR

## (undated) DEVICE — 3M™ TEGADERM™ TRANSPARENT FILM DRESSING FRAME STYLE, 1624W, 2-3/8 IN X 2-3/4 IN (6 CM X 7 CM), 100/CT 4CT/CASE: Brand: 3M™ TEGADERM™

## (undated) DEVICE — HIGH FLOW RATE EXTENSION SET, LUER LOCK ADAPTERS

## (undated) DEVICE — Z DISCONTINUED USE 2751540 TUBING IRRIG L10IN DISP PMP ENDOGATOR

## (undated) DEVICE — TOWEL SURG W17XL27IN STD BLU COT NONFENESTRATED PREWASHED

## (undated) DEVICE — SOLUTION IV 250ML 0.9% SOD CHL CLR INJ FLX BG CONT PRT CLSR

## (undated) DEVICE — THE MONARCH® "D" CARTRIDGE IS A SINGLE-USE POLYPROPYLENE CARTRIDGE FOR POSTERIOR CHAMBER IOL DELIVERY: Brand: MONARCH® III

## (undated) DEVICE — SYR 5ML 1/5 GRAD LL NSAF LF --

## (undated) DEVICE — SOLIDIFIER FLUID 3000 CC ABSORB

## (undated) DEVICE — SURGICAL PROCEDURE PACK CATRCT CUST

## (undated) DEVICE — QUILTED PREMIUM COMFORT UNDERPAD,EXTRA HEAVY: Brand: WINGS

## (undated) DEVICE — CATH IV AUTOGRD BC BLU 22GA 25 -- INSYTE

## (undated) DEVICE — BW-412T DISP COMBO CLEANING BRUSH: Brand: SINGLE USE COMBINATION CLEANING BRUSH

## (undated) DEVICE — ENDO CARRY-ON PROCEDURE KIT INCLUDES ENZYMATIC SPONGE, GAUZE, BIOHAZARD LABEL, TRAY, LUBRICANT, DIRTY SCOPE LABEL, WATER LABEL, TRAY, DRAWSTRING PAD, AND DEFENDO 4-PIECE KIT.: Brand: ENDO CARRY-ON PROCEDURE KIT

## (undated) DEVICE — KIT IV STRT W CHLORAPREP PD 1ML

## (undated) DEVICE — KENDALL DL ECG CABLE AND LEAD WIRE SYSTEM, 3-LEAD, SINGLE PATIENT USE: Brand: KENDALL

## (undated) DEVICE — SET ADMIN 16ML TBNG L100IN 2 Y INJ SITE IV PIGGY BK DISP

## (undated) DEVICE — CONNECTOR TBNG AUX H2O JET DISP FOR OLY 160/180 SER

## (undated) DEVICE — AIRLIFE™ U/CONNECT-IT OXYGEN TUBING 7 FEET (2.1 M) CRUSH-RESISTANT OXYGEN TUBING, VINYL TIPPED: Brand: AIRLIFE™

## (undated) DEVICE — KENDALL RADIOLUCENT FOAM MONITORING ELECTRODE -RECTANGULAR SHAPE: Brand: KENDALL

## (undated) DEVICE — SYRINGE MED 20ML STD CLR PLAS LUERLOCK TIP N CTRL DISP

## (undated) DEVICE — NEEDLE HYPO 18GA L1.5IN PNK S STL HUB POLYPR SHLD REG BVL